# Patient Record
Sex: MALE | Race: BLACK OR AFRICAN AMERICAN | NOT HISPANIC OR LATINO | Employment: FULL TIME | URBAN - METROPOLITAN AREA
[De-identification: names, ages, dates, MRNs, and addresses within clinical notes are randomized per-mention and may not be internally consistent; named-entity substitution may affect disease eponyms.]

---

## 2021-05-21 ENCOUNTER — TELEPHONE (OUTPATIENT)
Dept: ADMINISTRATIVE | Facility: OTHER | Age: 26
End: 2021-05-21

## 2021-06-01 ENCOUNTER — OFFICE VISIT (OUTPATIENT)
Dept: INTERNAL MEDICINE | Facility: CLINIC | Age: 26
End: 2021-06-01
Payer: COMMERCIAL

## 2021-06-01 ENCOUNTER — LAB VISIT (OUTPATIENT)
Dept: LAB | Facility: HOSPITAL | Age: 26
End: 2021-06-01
Attending: NURSE PRACTITIONER
Payer: COMMERCIAL

## 2021-06-01 VITALS
WEIGHT: 192.69 LBS | TEMPERATURE: 98 F | HEART RATE: 79 BPM | BODY MASS INDEX: 27.58 KG/M2 | DIASTOLIC BLOOD PRESSURE: 70 MMHG | SYSTOLIC BLOOD PRESSURE: 130 MMHG | OXYGEN SATURATION: 98 % | HEIGHT: 70 IN

## 2021-06-01 DIAGNOSIS — Z00.00 ENCOUNTER FOR MEDICAL EXAMINATION TO ESTABLISH CARE: ICD-10-CM

## 2021-06-01 DIAGNOSIS — Z13.6 ENCOUNTER FOR LIPID SCREENING FOR CARDIOVASCULAR DISEASE: ICD-10-CM

## 2021-06-01 DIAGNOSIS — Z13.220 ENCOUNTER FOR LIPID SCREENING FOR CARDIOVASCULAR DISEASE: ICD-10-CM

## 2021-06-01 DIAGNOSIS — Z00.00 ENCOUNTER FOR MEDICAL EXAMINATION TO ESTABLISH CARE: Primary | ICD-10-CM

## 2021-06-01 LAB
ALBUMIN SERPL BCP-MCNC: 4.3 G/DL (ref 3.5–5.2)
ALP SERPL-CCNC: 55 U/L (ref 55–135)
ALT SERPL W/O P-5'-P-CCNC: 14 U/L (ref 10–44)
ANION GAP SERPL CALC-SCNC: 9 MMOL/L (ref 8–16)
AST SERPL-CCNC: 26 U/L (ref 10–40)
BASOPHILS # BLD AUTO: 0.03 K/UL (ref 0–0.2)
BASOPHILS NFR BLD: 0.4 % (ref 0–1.9)
BILIRUB SERPL-MCNC: 0.4 MG/DL (ref 0.1–1)
BUN SERPL-MCNC: 12 MG/DL (ref 6–20)
CALCIUM SERPL-MCNC: 9.7 MG/DL (ref 8.7–10.5)
CHLORIDE SERPL-SCNC: 105 MMOL/L (ref 95–110)
CHOLEST SERPL-MCNC: 198 MG/DL (ref 120–199)
CHOLEST/HDLC SERPL: 3.8 {RATIO} (ref 2–5)
CO2 SERPL-SCNC: 24 MMOL/L (ref 23–29)
CREAT SERPL-MCNC: 1.2 MG/DL (ref 0.5–1.4)
DIFFERENTIAL METHOD: ABNORMAL
EOSINOPHIL # BLD AUTO: 0.1 K/UL (ref 0–0.5)
EOSINOPHIL NFR BLD: 0.8 % (ref 0–8)
ERYTHROCYTE [DISTWIDTH] IN BLOOD BY AUTOMATED COUNT: 15.5 % (ref 11.5–14.5)
EST. GFR  (AFRICAN AMERICAN): >60 ML/MIN/1.73 M^2
EST. GFR  (NON AFRICAN AMERICAN): >60 ML/MIN/1.73 M^2
GLUCOSE SERPL-MCNC: 91 MG/DL (ref 70–110)
HCT VFR BLD AUTO: 45.4 % (ref 40–54)
HDLC SERPL-MCNC: 52 MG/DL (ref 40–75)
HDLC SERPL: 26.3 % (ref 20–50)
HGB BLD-MCNC: 14.4 G/DL (ref 14–18)
IMM GRANULOCYTES # BLD AUTO: 0.08 K/UL (ref 0–0.04)
IMM GRANULOCYTES NFR BLD AUTO: 1 % (ref 0–0.5)
LDLC SERPL CALC-MCNC: 134.6 MG/DL (ref 63–159)
LYMPHOCYTES # BLD AUTO: 2 K/UL (ref 1–4.8)
LYMPHOCYTES NFR BLD: 25.7 % (ref 18–48)
MCH RBC QN AUTO: 28.1 PG (ref 27–31)
MCHC RBC AUTO-ENTMCNC: 31.7 G/DL (ref 32–36)
MCV RBC AUTO: 89 FL (ref 82–98)
MONOCYTES # BLD AUTO: 0.7 K/UL (ref 0.3–1)
MONOCYTES NFR BLD: 8.7 % (ref 4–15)
NEUTROPHILS # BLD AUTO: 5 K/UL (ref 1.8–7.7)
NEUTROPHILS NFR BLD: 63.4 % (ref 38–73)
NONHDLC SERPL-MCNC: 146 MG/DL
NRBC BLD-RTO: 0 /100 WBC
PLATELET # BLD AUTO: 349 K/UL (ref 150–450)
PMV BLD AUTO: 11.5 FL (ref 9.2–12.9)
POTASSIUM SERPL-SCNC: 4.3 MMOL/L (ref 3.5–5.1)
PROT SERPL-MCNC: 7 G/DL (ref 6–8.4)
RBC # BLD AUTO: 5.12 M/UL (ref 4.6–6.2)
SODIUM SERPL-SCNC: 138 MMOL/L (ref 136–145)
T4 FREE SERPL-MCNC: 0.96 NG/DL (ref 0.71–1.51)
TRIGL SERPL-MCNC: 57 MG/DL (ref 30–150)
TSH SERPL DL<=0.005 MIU/L-ACNC: 0.98 UIU/ML (ref 0.4–4)
WBC # BLD AUTO: 7.9 K/UL (ref 3.9–12.7)

## 2021-06-01 PROCEDURE — 80053 COMPREHEN METABOLIC PANEL: CPT | Performed by: NURSE PRACTITIONER

## 2021-06-01 PROCEDURE — 36415 COLL VENOUS BLD VENIPUNCTURE: CPT | Mod: PO | Performed by: NURSE PRACTITIONER

## 2021-06-01 PROCEDURE — 84443 ASSAY THYROID STIM HORMONE: CPT | Performed by: NURSE PRACTITIONER

## 2021-06-01 PROCEDURE — 85025 COMPLETE CBC W/AUTO DIFF WBC: CPT | Performed by: NURSE PRACTITIONER

## 2021-06-01 PROCEDURE — 99999 PR PBB SHADOW E&M-EST. PATIENT-LVL III: CPT | Mod: PBBFAC,,, | Performed by: NURSE PRACTITIONER

## 2021-06-01 PROCEDURE — 80061 LIPID PANEL: CPT | Performed by: NURSE PRACTITIONER

## 2021-06-01 PROCEDURE — 99385 PR PREVENTIVE VISIT,NEW,18-39: ICD-10-PCS | Mod: S$GLB,,, | Performed by: NURSE PRACTITIONER

## 2021-06-01 PROCEDURE — 99999 PR PBB SHADOW E&M-EST. PATIENT-LVL III: ICD-10-PCS | Mod: PBBFAC,,, | Performed by: NURSE PRACTITIONER

## 2021-06-01 PROCEDURE — 3008F PR BODY MASS INDEX (BMI) DOCUMENTED: ICD-10-PCS | Mod: CPTII,S$GLB,, | Performed by: NURSE PRACTITIONER

## 2021-06-01 PROCEDURE — 1126F PR PAIN SEVERITY QUANTIFIED, NO PAIN PRESENT: ICD-10-PCS | Mod: S$GLB,,, | Performed by: NURSE PRACTITIONER

## 2021-06-01 PROCEDURE — 1126F AMNT PAIN NOTED NONE PRSNT: CPT | Mod: S$GLB,,, | Performed by: NURSE PRACTITIONER

## 2021-06-01 PROCEDURE — 99385 PREV VISIT NEW AGE 18-39: CPT | Mod: S$GLB,,, | Performed by: NURSE PRACTITIONER

## 2021-06-01 PROCEDURE — 3008F BODY MASS INDEX DOCD: CPT | Mod: CPTII,S$GLB,, | Performed by: NURSE PRACTITIONER

## 2021-06-01 PROCEDURE — 84439 ASSAY OF FREE THYROXINE: CPT | Performed by: NURSE PRACTITIONER

## 2021-06-04 ENCOUNTER — PATIENT MESSAGE (OUTPATIENT)
Dept: INTERNAL MEDICINE | Facility: CLINIC | Age: 26
End: 2021-06-04

## 2021-07-26 ENCOUNTER — CLINICAL SUPPORT (OUTPATIENT)
Dept: URGENT CARE | Facility: CLINIC | Age: 26
End: 2021-07-26
Payer: COMMERCIAL

## 2021-07-26 DIAGNOSIS — Z11.59 ENCOUNTER FOR SCREENING FOR OTHER VIRAL DISEASES: Primary | ICD-10-CM

## 2021-07-26 LAB
CTP QC/QA: YES
SARS-COV-2 RDRP RESP QL NAA+PROBE: NEGATIVE

## 2021-07-26 PROCEDURE — 99211 OFF/OP EST MAY X REQ PHY/QHP: CPT | Mod: S$GLB,CS,, | Performed by: STUDENT IN AN ORGANIZED HEALTH CARE EDUCATION/TRAINING PROGRAM

## 2021-07-26 PROCEDURE — U0002 COVID-19 LAB TEST NON-CDC: HCPCS | Mod: QW,S$GLB,, | Performed by: STUDENT IN AN ORGANIZED HEALTH CARE EDUCATION/TRAINING PROGRAM

## 2021-07-26 PROCEDURE — U0002: ICD-10-PCS | Mod: QW,S$GLB,, | Performed by: STUDENT IN AN ORGANIZED HEALTH CARE EDUCATION/TRAINING PROGRAM

## 2021-07-26 PROCEDURE — 99211 PR OFFICE/OUTPT VISIT, EST, LEVL I: ICD-10-PCS | Mod: S$GLB,CS,, | Performed by: STUDENT IN AN ORGANIZED HEALTH CARE EDUCATION/TRAINING PROGRAM

## 2021-11-11 ENCOUNTER — CLINICAL SUPPORT (OUTPATIENT)
Dept: URGENT CARE | Facility: CLINIC | Age: 26
End: 2021-11-11
Payer: COMMERCIAL

## 2021-11-11 DIAGNOSIS — Z11.59 ENCOUNTER FOR SCREENING FOR OTHER VIRAL DISEASES: Primary | ICD-10-CM

## 2021-11-11 LAB
CTP QC/QA: YES
SARS-COV-2 RDRP RESP QL NAA+PROBE: NEGATIVE

## 2021-11-11 PROCEDURE — U0002: ICD-10-PCS | Mod: QW,S$GLB,, | Performed by: STUDENT IN AN ORGANIZED HEALTH CARE EDUCATION/TRAINING PROGRAM

## 2021-11-11 PROCEDURE — U0002 COVID-19 LAB TEST NON-CDC: HCPCS | Mod: QW,S$GLB,, | Performed by: STUDENT IN AN ORGANIZED HEALTH CARE EDUCATION/TRAINING PROGRAM

## 2021-11-11 PROCEDURE — 99211 OFF/OP EST MAY X REQ PHY/QHP: CPT | Mod: S$GLB,,, | Performed by: STUDENT IN AN ORGANIZED HEALTH CARE EDUCATION/TRAINING PROGRAM

## 2021-11-11 PROCEDURE — 99211 PR OFFICE/OUTPT VISIT, EST, LEVL I: ICD-10-PCS | Mod: S$GLB,,, | Performed by: STUDENT IN AN ORGANIZED HEALTH CARE EDUCATION/TRAINING PROGRAM

## 2021-12-01 ENCOUNTER — OFFICE VISIT (OUTPATIENT)
Dept: INTERNAL MEDICINE | Facility: CLINIC | Age: 26
End: 2021-12-01
Payer: COMMERCIAL

## 2021-12-01 VITALS
WEIGHT: 184.31 LBS | DIASTOLIC BLOOD PRESSURE: 72 MMHG | TEMPERATURE: 98 F | HEART RATE: 68 BPM | BODY MASS INDEX: 26.39 KG/M2 | SYSTOLIC BLOOD PRESSURE: 104 MMHG | RESPIRATION RATE: 19 BRPM | OXYGEN SATURATION: 98 % | HEIGHT: 70 IN

## 2021-12-01 DIAGNOSIS — Z00.00 PHYSICAL EXAM, ANNUAL: ICD-10-CM

## 2021-12-01 DIAGNOSIS — Z76.89 ESTABLISHING CARE WITH NEW DOCTOR, ENCOUNTER FOR: Primary | ICD-10-CM

## 2021-12-01 DIAGNOSIS — Z28.20 VACCINE REFUSED BY PATIENT: ICD-10-CM

## 2021-12-01 PROCEDURE — 99999 PR PBB SHADOW E&M-EST. PATIENT-LVL III: CPT | Mod: PBBFAC,,, | Performed by: STUDENT IN AN ORGANIZED HEALTH CARE EDUCATION/TRAINING PROGRAM

## 2021-12-01 PROCEDURE — 99395 PREV VISIT EST AGE 18-39: CPT | Mod: S$GLB,,, | Performed by: STUDENT IN AN ORGANIZED HEALTH CARE EDUCATION/TRAINING PROGRAM

## 2021-12-01 PROCEDURE — 99395 PR PREVENTIVE VISIT,EST,18-39: ICD-10-PCS | Mod: S$GLB,,, | Performed by: STUDENT IN AN ORGANIZED HEALTH CARE EDUCATION/TRAINING PROGRAM

## 2021-12-01 PROCEDURE — 99999 PR PBB SHADOW E&M-EST. PATIENT-LVL III: ICD-10-PCS | Mod: PBBFAC,,, | Performed by: STUDENT IN AN ORGANIZED HEALTH CARE EDUCATION/TRAINING PROGRAM

## 2023-02-14 ENCOUNTER — TELEPHONE (OUTPATIENT)
Dept: INTERNAL MEDICINE | Facility: CLINIC | Age: 28
End: 2023-02-14
Payer: COMMERCIAL

## 2023-02-14 NOTE — TELEPHONE ENCOUNTER
Reason for visit: Testicular pain      Comments:   Groin pain   I tried calling pt to assist with scheduling an appt.  No answer. Left VM for pt to call the clinic

## 2023-03-06 ENCOUNTER — PATIENT MESSAGE (OUTPATIENT)
Dept: ADMINISTRATIVE | Facility: HOSPITAL | Age: 28
End: 2023-03-06
Payer: COMMERCIAL

## 2023-03-06 ENCOUNTER — PATIENT OUTREACH (OUTPATIENT)
Dept: ADMINISTRATIVE | Facility: HOSPITAL | Age: 28
End: 2023-03-06
Payer: COMMERCIAL

## 2023-03-08 ENCOUNTER — TELEPHONE (OUTPATIENT)
Dept: INTERNAL MEDICINE | Facility: CLINIC | Age: 28
End: 2023-03-08
Payer: COMMERCIAL

## 2023-03-14 NOTE — PROGRESS NOTES
Subjective:      Chief Complaint: Groin Pain (right) and Testicle Pain (Swelling for a few weeks)    HPI  Mr. Arturo Salguero III is a 27-year-old man presenting for annual physical and with concern for hernia:     Hernia? :   -describing right-sided groin/testicular pain and swelling x weeks  -does not exercise regularly but does AC work (lots of heavy lifting)  -no associated unintentional weight loss, GI symptoms, recounted cryptorchidism, or family history of testicular cancer    Family, social, surgical Hx reviewed     Health Maintenance:  Due for routine screening labs and vaccinations    No past medical history on file.  No past surgical history on file.  Family History   Problem Relation Age of Onset    Hypertension Father     Diabetes Brother      Social History     Socioeconomic History    Marital status: Single   Tobacco Use    Smoking status: Never    Smokeless tobacco: Never   Substance and Sexual Activity    Alcohol use: Never    Drug use: Never    Sexual activity: Not Currently     Partners: Female     Birth control/protection: Condom     Review of patient's allergies indicates:   Allergen Reactions    Amoxicillin      Arturo Salguero III had no medications administered during this visit.      Review of Systems   Constitutional:  Negative for appetite change, chills and fever.   HENT: Negative.     Respiratory:  Negative for cough, chest tightness and shortness of breath.    Cardiovascular:  Negative for chest pain, palpitations and leg swelling.   Gastrointestinal:  Negative for abdominal distention, abdominal pain, blood in stool, constipation, diarrhea, nausea and vomiting.   Endocrine: Negative.    Genitourinary:  Positive for testicular pain. Negative for difficulty urinating, dysuria, frequency and hematuria.   Musculoskeletal: Negative.    Integumentary:  Negative.   Neurological: Negative.    Psychiatric/Behavioral: Negative.         Objective:      Vitals:    03/15/23 0802   BP: 126/70   Pulse: 70    Resp: 16   Temp: 97.9 °F (36.6 °C)      Physical Exam  Vitals reviewed.   Constitutional:       General: He is not in acute distress.     Appearance: Normal appearance.   HENT:      Head: Normocephalic and atraumatic.      Comments: Facial features are symmetric      Nose: Nose normal. No congestion or rhinorrhea.      Mouth/Throat:      Mouth: Mucous membranes are moist.      Pharynx: Oropharynx is clear. No oropharyngeal exudate or posterior oropharyngeal erythema.   Eyes:      General: No scleral icterus.     Extraocular Movements: Extraocular movements intact.      Conjunctiva/sclera: Conjunctivae normal.   Cardiovascular:      Rate and Rhythm: Normal rate and regular rhythm.      Pulses: Normal pulses.      Heart sounds: Normal heart sounds.   Pulmonary:      Effort: Pulmonary effort is normal. No respiratory distress.      Breath sounds: Normal breath sounds.   Genitourinary:     Comments: Marked right testicular swelling with low-dose scrotal distension.  Is limited accuracy inguinal canal assessment, no obvious hernia identified nor inguinal lymphadenopathy.  Musculoskeletal:         General: No deformity or signs of injury. Normal range of motion.      Cervical back: Normal range of motion.      Comments: Gait normal    Skin:     General: Skin is warm and dry.      Findings: No rash.   Neurological:      General: No focal deficit present.      Mental Status: He is alert and oriented to person, place, and time. Mental status is at baseline.   Psychiatric:         Mood and Affect: Mood normal.         Behavior: Behavior normal.         Thought Content: Thought content normal.     No current outpatient medications on file prior to visit.     No current facility-administered medications on file prior to visit.         Assessment:       1. Physical exam, annual    2. Hernia of abdominal wall    3. Right testicular pain        Plan:       Physical exam, annual  -     CBC Auto Differential; Future; Expected  date: 03/15/2023  -     Comprehensive Metabolic Panel; Future; Expected date: 03/15/2023  -     Hemoglobin A1C; Future; Expected date: 03/15/2023  -     Hepatitis C Antibody; Future; Expected date: 03/15/2023  -     HIV 1/2 Ag/Ab (4th Gen); Future; Expected date: 03/15/2023  -     Lipid Panel; Future; Expected date: 03/15/2023  -     T4; Future; Expected date: 03/15/2023  -     TSH; Future; Expected date: 03/15/2023  -     Vitamin D; Future; Expected date: 03/15/2023  -     APTT; Future; Expected date: 03/15/2023  -     PROTIME-INR; Future; Expected date: 03/15/2023   - annual screening labs ordered    Hernia of abdominal wall  Right testicular pain  -     US Scrotum And Testicles; Future; Expected date: 03/15/2023  -     BHCG Quant, Tumor Marker; Future; Expected date: 03/15/2023  -     Ambulatory referral/consult to General Surgery; Future; Expected date: 03/22/2023   - presumptive/provisional diagnosis hernia pending ultrasound results (abdominal and testicular)   - quantitative beta HCG also added to annual screening labs as above for further delineation of significant right testicular swelling   - general for surgery referral generated in preparation for ultrasound results as above    Other orders  -     (In Office Administered) Tdap Vaccine

## 2023-03-15 ENCOUNTER — LAB VISIT (OUTPATIENT)
Dept: LAB | Facility: HOSPITAL | Age: 28
End: 2023-03-15
Attending: STUDENT IN AN ORGANIZED HEALTH CARE EDUCATION/TRAINING PROGRAM
Payer: COMMERCIAL

## 2023-03-15 ENCOUNTER — OFFICE VISIT (OUTPATIENT)
Dept: INTERNAL MEDICINE | Facility: CLINIC | Age: 28
End: 2023-03-15
Payer: COMMERCIAL

## 2023-03-15 VITALS
HEART RATE: 70 BPM | RESPIRATION RATE: 16 BRPM | TEMPERATURE: 98 F | HEIGHT: 70 IN | WEIGHT: 220.25 LBS | OXYGEN SATURATION: 99 % | BODY MASS INDEX: 31.53 KG/M2 | SYSTOLIC BLOOD PRESSURE: 126 MMHG | DIASTOLIC BLOOD PRESSURE: 70 MMHG

## 2023-03-15 DIAGNOSIS — K43.9 HERNIA OF ABDOMINAL WALL: ICD-10-CM

## 2023-03-15 DIAGNOSIS — Z00.00 PHYSICAL EXAM, ANNUAL: Primary | ICD-10-CM

## 2023-03-15 DIAGNOSIS — N50.811 RIGHT TESTICULAR PAIN: ICD-10-CM

## 2023-03-15 DIAGNOSIS — Z00.00 PHYSICAL EXAM, ANNUAL: ICD-10-CM

## 2023-03-15 LAB
25(OH)D3+25(OH)D2 SERPL-MCNC: 24 NG/ML (ref 30–96)
ALBUMIN SERPL BCP-MCNC: 4.2 G/DL (ref 3.5–5.2)
ALP SERPL-CCNC: 84 U/L (ref 55–135)
ALT SERPL W/O P-5'-P-CCNC: 27 U/L (ref 10–44)
ANION GAP SERPL CALC-SCNC: 8 MMOL/L (ref 8–16)
APTT BLDCRRT: 30.5 SEC (ref 21–32)
AST SERPL-CCNC: 35 U/L (ref 10–40)
BASOPHILS # BLD AUTO: 0.03 K/UL (ref 0–0.2)
BASOPHILS NFR BLD: 0.5 % (ref 0–1.9)
BILIRUB SERPL-MCNC: 0.4 MG/DL (ref 0.1–1)
BUN SERPL-MCNC: 9 MG/DL (ref 6–20)
CALCIUM SERPL-MCNC: 9.7 MG/DL (ref 8.7–10.5)
CHLORIDE SERPL-SCNC: 104 MMOL/L (ref 95–110)
CHOLEST SERPL-MCNC: 211 MG/DL (ref 120–199)
CHOLEST/HDLC SERPL: 5.3 {RATIO} (ref 2–5)
CO2 SERPL-SCNC: 26 MMOL/L (ref 23–29)
CREAT SERPL-MCNC: 1.1 MG/DL (ref 0.5–1.4)
DIFFERENTIAL METHOD: ABNORMAL
EOSINOPHIL # BLD AUTO: 0.1 K/UL (ref 0–0.5)
EOSINOPHIL NFR BLD: 2.1 % (ref 0–8)
ERYTHROCYTE [DISTWIDTH] IN BLOOD BY AUTOMATED COUNT: 15.4 % (ref 11.5–14.5)
EST. GFR  (NO RACE VARIABLE): >60 ML/MIN/1.73 M^2
ESTIMATED AVG GLUCOSE: 108 MG/DL (ref 68–131)
GLUCOSE SERPL-MCNC: 89 MG/DL (ref 70–110)
HBA1C MFR BLD: 5.4 % (ref 4–5.6)
HCT VFR BLD AUTO: 47.3 % (ref 40–54)
HCV AB SERPL QL IA: NORMAL
HDLC SERPL-MCNC: 40 MG/DL (ref 40–75)
HDLC SERPL: 19 % (ref 20–50)
HGB BLD-MCNC: 14.9 G/DL (ref 14–18)
HIV 1+2 AB+HIV1 P24 AG SERPL QL IA: NORMAL
IMM GRANULOCYTES # BLD AUTO: 0.02 K/UL (ref 0–0.04)
IMM GRANULOCYTES NFR BLD AUTO: 0.3 % (ref 0–0.5)
INR PPP: 1 (ref 0.8–1.2)
LDLC SERPL CALC-MCNC: 152.4 MG/DL (ref 63–159)
LYMPHOCYTES # BLD AUTO: 1.4 K/UL (ref 1–4.8)
LYMPHOCYTES NFR BLD: 23.5 % (ref 18–48)
MCH RBC QN AUTO: 27.7 PG (ref 27–31)
MCHC RBC AUTO-ENTMCNC: 31.5 G/DL (ref 32–36)
MCV RBC AUTO: 88 FL (ref 82–98)
MONOCYTES # BLD AUTO: 0.7 K/UL (ref 0.3–1)
MONOCYTES NFR BLD: 10.6 % (ref 4–15)
NEUTROPHILS # BLD AUTO: 3.9 K/UL (ref 1.8–7.7)
NEUTROPHILS NFR BLD: 63 % (ref 38–73)
NONHDLC SERPL-MCNC: 171 MG/DL
NRBC BLD-RTO: 0 /100 WBC
PLATELET # BLD AUTO: 378 K/UL (ref 150–450)
PMV BLD AUTO: 10.7 FL (ref 9.2–12.9)
POTASSIUM SERPL-SCNC: 4.3 MMOL/L (ref 3.5–5.1)
PROT SERPL-MCNC: 7.5 G/DL (ref 6–8.4)
PROTHROMBIN TIME: 10.8 SEC (ref 9–12.5)
RBC # BLD AUTO: 5.37 M/UL (ref 4.6–6.2)
SODIUM SERPL-SCNC: 138 MMOL/L (ref 136–145)
T4 SERPL-MCNC: 5.6 UG/DL (ref 4.5–11.5)
TRIGL SERPL-MCNC: 93 MG/DL (ref 30–150)
TSH SERPL DL<=0.005 MIU/L-ACNC: 1.4 UIU/ML (ref 0.4–4)
WBC # BLD AUTO: 6.13 K/UL (ref 3.9–12.7)

## 2023-03-15 PROCEDURE — 1159F PR MEDICATION LIST DOCUMENTED IN MEDICAL RECORD: ICD-10-PCS | Mod: CPTII,S$GLB,, | Performed by: STUDENT IN AN ORGANIZED HEALTH CARE EDUCATION/TRAINING PROGRAM

## 2023-03-15 PROCEDURE — 90715 TDAP VACCINE 7 YRS/> IM: CPT | Mod: S$GLB,,, | Performed by: STUDENT IN AN ORGANIZED HEALTH CARE EDUCATION/TRAINING PROGRAM

## 2023-03-15 PROCEDURE — 85025 COMPLETE CBC W/AUTO DIFF WBC: CPT | Performed by: STUDENT IN AN ORGANIZED HEALTH CARE EDUCATION/TRAINING PROGRAM

## 2023-03-15 PROCEDURE — 99395 PREV VISIT EST AGE 18-39: CPT | Mod: 25,S$GLB,, | Performed by: STUDENT IN AN ORGANIZED HEALTH CARE EDUCATION/TRAINING PROGRAM

## 2023-03-15 PROCEDURE — 3008F PR BODY MASS INDEX (BMI) DOCUMENTED: ICD-10-PCS | Mod: CPTII,S$GLB,, | Performed by: STUDENT IN AN ORGANIZED HEALTH CARE EDUCATION/TRAINING PROGRAM

## 2023-03-15 PROCEDURE — 90471 IMMUNIZATION ADMIN: CPT | Mod: S$GLB,,, | Performed by: STUDENT IN AN ORGANIZED HEALTH CARE EDUCATION/TRAINING PROGRAM

## 2023-03-15 PROCEDURE — 1159F MED LIST DOCD IN RCRD: CPT | Mod: CPTII,S$GLB,, | Performed by: STUDENT IN AN ORGANIZED HEALTH CARE EDUCATION/TRAINING PROGRAM

## 2023-03-15 PROCEDURE — 90471 TDAP VACCINE GREATER THAN OR EQUAL TO 7YO IM: ICD-10-PCS | Mod: S$GLB,,, | Performed by: STUDENT IN AN ORGANIZED HEALTH CARE EDUCATION/TRAINING PROGRAM

## 2023-03-15 PROCEDURE — 36415 COLL VENOUS BLD VENIPUNCTURE: CPT | Mod: PO | Performed by: STUDENT IN AN ORGANIZED HEALTH CARE EDUCATION/TRAINING PROGRAM

## 2023-03-15 PROCEDURE — 3008F BODY MASS INDEX DOCD: CPT | Mod: CPTII,S$GLB,, | Performed by: STUDENT IN AN ORGANIZED HEALTH CARE EDUCATION/TRAINING PROGRAM

## 2023-03-15 PROCEDURE — 82306 VITAMIN D 25 HYDROXY: CPT | Performed by: STUDENT IN AN ORGANIZED HEALTH CARE EDUCATION/TRAINING PROGRAM

## 2023-03-15 PROCEDURE — 99395 PR PREVENTIVE VISIT,EST,18-39: ICD-10-PCS | Mod: 25,S$GLB,, | Performed by: STUDENT IN AN ORGANIZED HEALTH CARE EDUCATION/TRAINING PROGRAM

## 2023-03-15 PROCEDURE — 80053 COMPREHEN METABOLIC PANEL: CPT | Performed by: STUDENT IN AN ORGANIZED HEALTH CARE EDUCATION/TRAINING PROGRAM

## 2023-03-15 PROCEDURE — 3074F PR MOST RECENT SYSTOLIC BLOOD PRESSURE < 130 MM HG: ICD-10-PCS | Mod: CPTII,S$GLB,, | Performed by: STUDENT IN AN ORGANIZED HEALTH CARE EDUCATION/TRAINING PROGRAM

## 2023-03-15 PROCEDURE — 3074F SYST BP LT 130 MM HG: CPT | Mod: CPTII,S$GLB,, | Performed by: STUDENT IN AN ORGANIZED HEALTH CARE EDUCATION/TRAINING PROGRAM

## 2023-03-15 PROCEDURE — 3078F DIAST BP <80 MM HG: CPT | Mod: CPTII,S$GLB,, | Performed by: STUDENT IN AN ORGANIZED HEALTH CARE EDUCATION/TRAINING PROGRAM

## 2023-03-15 PROCEDURE — 87389 HIV-1 AG W/HIV-1&-2 AB AG IA: CPT | Performed by: STUDENT IN AN ORGANIZED HEALTH CARE EDUCATION/TRAINING PROGRAM

## 2023-03-15 PROCEDURE — 83036 HEMOGLOBIN GLYCOSYLATED A1C: CPT | Performed by: STUDENT IN AN ORGANIZED HEALTH CARE EDUCATION/TRAINING PROGRAM

## 2023-03-15 PROCEDURE — 85610 PROTHROMBIN TIME: CPT | Performed by: STUDENT IN AN ORGANIZED HEALTH CARE EDUCATION/TRAINING PROGRAM

## 2023-03-15 PROCEDURE — 84436 ASSAY OF TOTAL THYROXINE: CPT | Performed by: STUDENT IN AN ORGANIZED HEALTH CARE EDUCATION/TRAINING PROGRAM

## 2023-03-15 PROCEDURE — 85730 THROMBOPLASTIN TIME PARTIAL: CPT | Performed by: STUDENT IN AN ORGANIZED HEALTH CARE EDUCATION/TRAINING PROGRAM

## 2023-03-15 PROCEDURE — 99999 PR PBB SHADOW E&M-EST. PATIENT-LVL IV: ICD-10-PCS | Mod: PBBFAC,,, | Performed by: STUDENT IN AN ORGANIZED HEALTH CARE EDUCATION/TRAINING PROGRAM

## 2023-03-15 PROCEDURE — 86803 HEPATITIS C AB TEST: CPT | Performed by: STUDENT IN AN ORGANIZED HEALTH CARE EDUCATION/TRAINING PROGRAM

## 2023-03-15 PROCEDURE — 80061 LIPID PANEL: CPT | Performed by: STUDENT IN AN ORGANIZED HEALTH CARE EDUCATION/TRAINING PROGRAM

## 2023-03-15 PROCEDURE — 84702 CHORIONIC GONADOTROPIN TEST: CPT | Performed by: STUDENT IN AN ORGANIZED HEALTH CARE EDUCATION/TRAINING PROGRAM

## 2023-03-15 PROCEDURE — 90715 TDAP VACCINE GREATER THAN OR EQUAL TO 7YO IM: ICD-10-PCS | Mod: S$GLB,,, | Performed by: STUDENT IN AN ORGANIZED HEALTH CARE EDUCATION/TRAINING PROGRAM

## 2023-03-15 PROCEDURE — 99999 PR PBB SHADOW E&M-EST. PATIENT-LVL IV: CPT | Mod: PBBFAC,,, | Performed by: STUDENT IN AN ORGANIZED HEALTH CARE EDUCATION/TRAINING PROGRAM

## 2023-03-15 PROCEDURE — 84443 ASSAY THYROID STIM HORMONE: CPT | Performed by: STUDENT IN AN ORGANIZED HEALTH CARE EDUCATION/TRAINING PROGRAM

## 2023-03-15 PROCEDURE — 3078F PR MOST RECENT DIASTOLIC BLOOD PRESSURE < 80 MM HG: ICD-10-PCS | Mod: CPTII,S$GLB,, | Performed by: STUDENT IN AN ORGANIZED HEALTH CARE EDUCATION/TRAINING PROGRAM

## 2023-03-20 ENCOUNTER — HOSPITAL ENCOUNTER (OUTPATIENT)
Dept: RADIOLOGY | Facility: HOSPITAL | Age: 28
Discharge: HOME | End: 2023-03-20
Attending: STUDENT IN AN ORGANIZED HEALTH CARE EDUCATION/TRAINING PROGRAM
Payer: COMMERCIAL

## 2023-03-20 DIAGNOSIS — K43.9 HERNIA OF ABDOMINAL WALL: ICD-10-CM

## 2023-03-20 DIAGNOSIS — R93.89 ABNORMAL ULTRASOUND: Primary | ICD-10-CM

## 2023-03-20 DIAGNOSIS — N50.811 RIGHT TESTICULAR PAIN: ICD-10-CM

## 2023-03-20 PROCEDURE — 76870 US EXAM SCROTUM: CPT | Mod: TC

## 2023-03-20 PROCEDURE — 76870 US SCROTUM AND TESTICLES: ICD-10-PCS | Mod: 26,,, | Performed by: RADIOLOGY

## 2023-03-20 PROCEDURE — 76705 ECHO EXAM OF ABDOMEN: CPT | Mod: TC

## 2023-03-20 PROCEDURE — 76870 US EXAM SCROTUM: CPT | Mod: 26,,, | Performed by: RADIOLOGY

## 2023-03-20 PROCEDURE — 76705 US ABDOMEN LIMITED_HERNIA: ICD-10-PCS | Mod: 26,,, | Performed by: RADIOLOGY

## 2023-03-20 PROCEDURE — 76705 ECHO EXAM OF ABDOMEN: CPT | Mod: 26,,, | Performed by: RADIOLOGY

## 2023-03-21 ENCOUNTER — OFFICE VISIT (OUTPATIENT)
Dept: UROLOGY | Facility: CLINIC | Age: 28
End: 2023-03-21
Payer: COMMERCIAL

## 2023-03-21 ENCOUNTER — LAB VISIT (OUTPATIENT)
Dept: LAB | Facility: HOSPITAL | Age: 28
End: 2023-03-21
Attending: UROLOGY
Payer: COMMERCIAL

## 2023-03-21 VITALS
HEART RATE: 73 BPM | HEIGHT: 70 IN | WEIGHT: 220.25 LBS | BODY MASS INDEX: 31.53 KG/M2 | DIASTOLIC BLOOD PRESSURE: 73 MMHG | SYSTOLIC BLOOD PRESSURE: 155 MMHG

## 2023-03-21 DIAGNOSIS — N50.89 MASS OF RIGHT TESTIS: Primary | ICD-10-CM

## 2023-03-21 DIAGNOSIS — N50.89 MASS OF RIGHT TESTIS: ICD-10-CM

## 2023-03-21 DIAGNOSIS — R93.89 ABNORMAL ULTRASOUND: ICD-10-CM

## 2023-03-21 LAB
AFP SERPL-MCNC: 694 NG/ML (ref 0–8.4)
B-HCG SERPL-ACNC: 710 IU/L
LDH SERPL L TO P-CCNC: 677 U/L (ref 110–260)

## 2023-03-21 PROCEDURE — 99999 PR PBB SHADOW E&M-EST. PATIENT-LVL III: ICD-10-PCS | Mod: PBBFAC,,, | Performed by: UROLOGY

## 2023-03-21 PROCEDURE — 99204 OFFICE O/P NEW MOD 45 MIN: CPT | Mod: S$GLB,,, | Performed by: UROLOGY

## 2023-03-21 PROCEDURE — 99213 OFFICE O/P EST LOW 20 MIN: CPT | Mod: PBBFAC | Performed by: UROLOGY

## 2023-03-21 PROCEDURE — 82105 ALPHA-FETOPROTEIN SERUM: CPT | Performed by: UROLOGY

## 2023-03-21 PROCEDURE — 83615 LACTATE (LD) (LDH) ENZYME: CPT | Performed by: UROLOGY

## 2023-03-21 PROCEDURE — 87086 URINE CULTURE/COLONY COUNT: CPT | Performed by: UROLOGY

## 2023-03-21 PROCEDURE — 99999 PR PBB SHADOW E&M-EST. PATIENT-LVL III: CPT | Mod: PBBFAC,,, | Performed by: UROLOGY

## 2023-03-21 PROCEDURE — 84702 CHORIONIC GONADOTROPIN TEST: CPT | Performed by: UROLOGY

## 2023-03-21 PROCEDURE — 36415 COLL VENOUS BLD VENIPUNCTURE: CPT | Performed by: UROLOGY

## 2023-03-21 PROCEDURE — 99204 PR OFFICE/OUTPT VISIT, NEW, LEVL IV, 45-59 MIN: ICD-10-PCS | Mod: S$GLB,,, | Performed by: UROLOGY

## 2023-03-21 NOTE — PROGRESS NOTES
"Urology - [facility]  Clinic Note    SUBJECTIVE:     Chief Complaint: Abnormal scrotal ultrasound    History of Present Illness:  Arturo Salguero III is a 27 y.o. male who presents with an abnormal scrotal ultrasound. Initially obtained as part of an evaluation for right sided groin and testicle swelling and pain.  US on 03/20/23 demonstrated "bilateral testicular enlargement with markedly heterogeneous echotexture, concerning for infiltrative process such as lymphoma."  He also underwent an inguinal ultrasound at the same time, negative for hernia.    Patient reports right groin pain that began a month ago, mostly related to movement and certain positions, however he also had pain at rest. He also reports painless right testicular swelling that has been present for about 1 month.  He reports it has been stable since he first became aware of it.  He denies prior similar episodes. He denies complaints or changes related to the left testicle, including swelling and pain.     He has no significant past medical history and does not take any medications. He denies family history of any cancers        PATIENT HISTORY:  History reviewed. No pertinent past medical history.  History reviewed. No pertinent surgical history.  Family History   Problem Relation Age of Onset    Hypertension Father     Diabetes Brother      Social History     Socioeconomic History    Marital status: Single   Tobacco Use    Smoking status: Never    Smokeless tobacco: Never   Substance and Sexual Activity    Alcohol use: Never    Drug use: Never    Sexual activity: Not Currently     Partners: Female     Birth control/protection: Condom       Medications:  No outpatient encounter medications on file as of 3/21/2023.     No facility-administered encounter medications on file as of 3/21/2023.     Allergies:  Amoxicillin    Review of Systems:  Review of Systems   Constitutional:  Negative for chills, fever, malaise/fatigue and weight loss. " "  Respiratory:  Negative for shortness of breath.    Cardiovascular:  Negative for chest pain.   Gastrointestinal:  Negative for constipation, diarrhea, nausea and vomiting.   Genitourinary:  Negative for flank pain, frequency and hematuria.        Painless right testicular swelling   Musculoskeletal:         Right groin pain, primarily with movement     OBJECTIVE:     Estimated body mass index is 31.6 kg/m² as calculated from the following:    Height as of this encounter: 5' 10" (1.778 m).    Weight as of this encounter: 99.9 kg (220 lb 3.8 oz).    Vital Signs (Most Recent)  Pulse: 73 (03/21/23 0912)  BP: (!) 155/73 (03/21/23 0912)    Physical Exam  Constitutional:       General: He is not in acute distress.     Appearance: Normal appearance. He is not ill-appearing.   HENT:      Head: Normocephalic and atraumatic.      Mouth/Throat:      Mouth: Mucous membranes are moist.   Eyes:      Extraocular Movements: Extraocular movements intact.   Cardiovascular:      Rate and Rhythm: Normal rate.   Pulmonary:      Effort: Pulmonary effort is normal.   Abdominal:      Palpations: Abdomen is soft.   Genitourinary:     Penis: Normal.       Comments: The right testicle is enlarged and firm, approximately 130-150cc. Non-tender, no erythema or fluctuance. The left testicle is approximately 16 cc and is non-tender.   Musculoskeletal:      Cervical back: Normal range of motion.   Skin:     General: Skin is warm and dry.   Neurological:      Mental Status: He is alert and oriented to person, place, and time.   Psychiatric:         Mood and Affect: Mood normal.         Behavior: Behavior normal.     Labs:  Lab Results   Component Value Date    BUN 9 03/15/2023    CREATININE 1.1 03/15/2023    WBC 6.13 03/15/2023    HGB 14.9 03/15/2023    HCT 47.3 03/15/2023     03/15/2023    AST 35 03/15/2023    ALT 27 03/15/2023    ALKPHOS 84 03/15/2023    ALBUMIN 4.2 03/15/2023    HGBA1C 5.4 03/15/2023        No results found for: PSA, " PSADIAG, PSAFREE, PSAFREEPCT, PSARAT      Imaging:  US abdomen 03/20/23: No evidence of hernia  US scrotum and testicles 03/20/23: Bilateral testicular enlargement with markedly heterogeneous echotexture, concerning for infiltrative process such as lymphoma    ASSESSMENT     1. Mass of right testis    2. Abnormal ultrasound        PLAN:        -tumor markers today  -plan for CT chest abdomen and pelvis to rule out systemic lymphoma or lymphadenopathy.  -likely plan for right radical orchiectomy, tentatively scheduled for Friday March 31st at 7:00 a.m..  -will contact [employer] regarding the patient's desire to have a testicular prosthesis.  -sperm banking information provided.      Perico Shah MD     Letter to Glen Andrade MD, Glen Andrade MD     The patient was seen and examined with the resident physician.  All questions were answered.  The plan was discussed with the patient and I concur with the resident physician's documentation.

## 2023-03-22 LAB — BACTERIA UR CULT: NO GROWTH

## 2023-03-23 ENCOUNTER — HOSPITAL ENCOUNTER (OUTPATIENT)
Dept: RADIOLOGY | Facility: HOSPITAL | Age: 28
Discharge: HOME | End: 2023-03-23
Attending: UROLOGY
Payer: COMMERCIAL

## 2023-03-23 DIAGNOSIS — N50.89 MASS OF RIGHT TESTIS: ICD-10-CM

## 2023-03-23 LAB — B-HCG SERPL-ACNC: 690 IU/L

## 2023-03-23 PROCEDURE — 71260 CT THORAX DX C+: CPT | Mod: TC

## 2023-03-23 PROCEDURE — 71260 CT CHEST ABDOMEN PELVIS WITH CONTRAST (XPD): ICD-10-PCS | Mod: 26,,, | Performed by: RADIOLOGY

## 2023-03-23 PROCEDURE — 25500020 PHARM REV CODE 255: Performed by: UROLOGY

## 2023-03-23 PROCEDURE — 74177 CT ABD & PELVIS W/CONTRAST: CPT | Mod: 26,,, | Performed by: RADIOLOGY

## 2023-03-23 PROCEDURE — 74177 CT ABD & PELVIS W/CONTRAST: CPT | Mod: TC

## 2023-03-23 PROCEDURE — 71260 CT THORAX DX C+: CPT | Mod: 26,,, | Performed by: RADIOLOGY

## 2023-03-23 PROCEDURE — 74177 CT CHEST ABDOMEN PELVIS WITH CONTRAST (XPD): ICD-10-PCS | Mod: 26,,, | Performed by: RADIOLOGY

## 2023-03-23 RX ADMIN — IOHEXOL 100 ML: 350 INJECTION, SOLUTION INTRAVENOUS at 08:03

## 2023-03-30 ENCOUNTER — ANESTHESIA EVENT (OUTPATIENT)
Dept: SURGERY | Facility: HOSPITAL | Age: 28
End: 2023-03-30
Payer: COMMERCIAL

## 2023-03-30 ENCOUNTER — TELEPHONE (OUTPATIENT)
Dept: UROLOGY | Facility: CLINIC | Age: 28
End: 2023-03-30
Payer: COMMERCIAL

## 2023-03-30 NOTE — PRE-PROCEDURE INSTRUCTIONS
PreOp Instructions given:   - Verbal medication information (what to hold and what to take)   - NPO guidelines   - Arrival place directions given; time to be given the day before procedure by the   Surgeon's Office [address]  - Bathing with antibacterial soap   - Don't wear any jewelry or bring any valuables AM of surgery   - No makeup or moisturizer to face   - No perfume/cologne, powder, lotions or aftershave   Pt. verbalized understanding.   Pt denies any FAMILY h/o Anesthesia/Sedation complications or side effects.    THIS WILL BE PATIENT'S 1ST SURGERY

## 2023-03-30 NOTE — ANESTHESIA PREPROCEDURE EVALUATION
[facility]  Anesthesia Pre-Operative Evaluation         Patient Name: Arturo Salguero III  YOB: 1995  MRN: [id number]    SUBJECTIVE:     Pre-operative evaluation for Procedure(s) (LRB):  ORCHIECTOMY, RADICAL, INGUINAL APPROACH (Right)     03/30/2023    Arturo Salguero III is a 27 y.o. male w/ a significant PMHx of right groin pain and b/l testicular enlargement with markedly heterogenous echotexture c/f infiltrative process such as lymphoma who presents for the above procedure.      LDA: None documented.    Prev airway: None documented.     Drips: None documented.    There is no problem list on file for this patient.      Review of patient's allergies indicates:   Allergen Reactions    Amoxicillin        Current Inpatient Medications:      No current facility-administered medications on file prior to encounter.     No current outpatient medications on file prior to encounter.       No past surgical history on file.    Social History     Socioeconomic History    Marital status: Single   Tobacco Use    Smoking status: Never    Smokeless tobacco: Never   Substance and Sexual Activity    Alcohol use: Never    Drug use: Never    Sexual activity: Not Currently     Partners: Female     Birth control/protection: Condom       OBJECTIVE:     Vital Signs Range (Last 24H):         CBC:   No results for input(s): WBC, RBC, HGB, HCT, PLT, MCV, MCH, MCHC in the last 72 hours.    CMP: No results for input(s): NA, K, CL, CO2, BUN, CREATININE, GLU, MG, PHOS, CALCIUM, ALBUMIN, PROT, ALKPHOS, ALT, AST, BILITOT in the last 72 hours.    INR:  No results for input(s): PT, INR, PROTIME, APTT in the last 72 hours.    Diagnostic Studies: No relevant studies.    EKG: No recent studies available.    2D ECHO:  No results found for this or any previous visit.      ASSESSMENT/PLAN:         Pre-op Assessment    I have reviewed the Patient Summary Reports.    I have  reviewed the NPO Status.   I have reviewed the Medications.     Review of Systems  Anesthesia Hx:  No problems with previous Anesthesia    Hematology/Oncology:  Hematology Normal   Oncology Normal     EENT/Dental:EENT/Dental Normal   Cardiovascular:  Cardiovascular Normal     Pulmonary:  Pulmonary Normal    Renal/:  Renal/ Normal     Hepatic/GI:  Hepatic/GI Normal    Neurological:  Neurology Normal    Endocrine:  Endocrine Normal    Psych:  Psychiatric Normal           Physical Exam  General: Well nourished, Alert, Oriented and Cooperative    Airway:  Mallampati: III / II  Mouth Opening: Normal  TM Distance: Normal  Tongue: Normal  Neck ROM: Normal ROM    Chest/Lungs:  Normal Respiratory Rate    Heart:  Rate: Normal        Anesthesia Plan  Type of Anesthesia, risks & benefits discussed:    Anesthesia Type: Gen ETT  Intra-op Monitoring Plan: Standard ASA Monitors  Post Op Pain Control Plan: multimodal analgesia  Induction:  IV  Airway Plan: Direct, Post-Induction  Informed Consent: Informed consent signed with the Patient and all parties understand the risks and agree with anesthesia plan.  All questions answered.   ASA Score: 2  Day of Surgery Review of History & Physical: H&P Update referred to the surgeon/provider.    Ready For Surgery From Anesthesia Perspective.     .

## 2023-03-30 NOTE — TELEPHONE ENCOUNTER
"You are scheduled for surgery with Dr. Shah on 3/31/2023. Your arrival time is 5 am. You are to report to the 2nd floor surgery center (take the atrium elevators right next to the piano up to 2nd floor). Upon exiting the elevators, you will see a sign saying, "Surgery Center and Family Waiting Room". Follow the hallway and check in at the desk. You need someone with you to drive you home when you are released from the hospital.  No alcohol 24 hours before and after and no smoking a few days prior. NOTHING TO EAT OR DRINK AFTER MIDNIGHT THE NIGHT PRIOR TO THE SURGERY INCLUDING GUM, CANDY AND MINTS. Take a shower the night before and the morning of with Hibiclens Antibacterial soap and no lotion, cologne, deodorant or powder in the morning.      "

## 2023-03-31 ENCOUNTER — HOSPITAL ENCOUNTER (OUTPATIENT)
Facility: HOSPITAL | Age: 28
Discharge: HOME | End: 2023-03-31
Attending: UROLOGY | Admitting: UROLOGY
Payer: COMMERCIAL

## 2023-03-31 ENCOUNTER — ANESTHESIA (OUTPATIENT)
Dept: SURGERY | Facility: HOSPITAL | Age: 28
End: 2023-03-31
Payer: COMMERCIAL

## 2023-03-31 VITALS
SYSTOLIC BLOOD PRESSURE: 133 MMHG | BODY MASS INDEX: 31.5 KG/M2 | TEMPERATURE: 98 F | RESPIRATION RATE: 19 BRPM | OXYGEN SATURATION: 96 % | HEIGHT: 70 IN | WEIGHT: 220 LBS | HEART RATE: 90 BPM | DIASTOLIC BLOOD PRESSURE: 66 MMHG

## 2023-03-31 DIAGNOSIS — N50.89 TESTICULAR MASS: Primary | ICD-10-CM

## 2023-03-31 DIAGNOSIS — N50.89 MASS OF RIGHT TESTIS: Primary | ICD-10-CM

## 2023-03-31 PROCEDURE — 25000003 PHARM REV CODE 250: Performed by: STUDENT IN AN ORGANIZED HEALTH CARE EDUCATION/TRAINING PROGRAM

## 2023-03-31 PROCEDURE — 63600175 PHARM REV CODE 636 W HCPCS: Performed by: STUDENT IN AN ORGANIZED HEALTH CARE EDUCATION/TRAINING PROGRAM

## 2023-03-31 PROCEDURE — 27800903 OPTIME MED/SURG SUP & DEVICES OTHER IMPLANTS: Performed by: UROLOGY

## 2023-03-31 PROCEDURE — 37000008 HC ANESTHESIA 1ST 15 MINUTES: Performed by: UROLOGY

## 2023-03-31 PROCEDURE — 37000009 HC ANESTHESIA EA ADD 15 MINS: Performed by: UROLOGY

## 2023-03-31 PROCEDURE — 71000015 HC POSTOP RECOV 1ST HR: Performed by: UROLOGY

## 2023-03-31 PROCEDURE — 54530 REMOVAL OF TESTIS: CPT | Mod: RT,,, | Performed by: UROLOGY

## 2023-03-31 PROCEDURE — 71000044 HC DOSC ROUTINE RECOVERY FIRST HOUR: Performed by: UROLOGY

## 2023-03-31 PROCEDURE — 25000003 PHARM REV CODE 250: Performed by: UROLOGY

## 2023-03-31 PROCEDURE — 63600175 PHARM REV CODE 636 W HCPCS: Performed by: UROLOGY

## 2023-03-31 PROCEDURE — D9220A PRA ANESTHESIA: Mod: ,,, | Performed by: STUDENT IN AN ORGANIZED HEALTH CARE EDUCATION/TRAINING PROGRAM

## 2023-03-31 PROCEDURE — 54530 PR REMOVAL TESTIS,RADICAL: ICD-10-PCS | Mod: RT,,, | Performed by: UROLOGY

## 2023-03-31 PROCEDURE — D9220A PRA ANESTHESIA: ICD-10-PCS | Mod: ,,, | Performed by: STUDENT IN AN ORGANIZED HEALTH CARE EDUCATION/TRAINING PROGRAM

## 2023-03-31 PROCEDURE — 36000707: Performed by: UROLOGY

## 2023-03-31 PROCEDURE — 36000706: Performed by: UROLOGY

## 2023-03-31 DEVICE — IMPLANTABLE DEVICE: Type: IMPLANTABLE DEVICE | Site: SCROTUM | Status: FUNCTIONAL

## 2023-03-31 RX ORDER — SODIUM CHLORIDE 0.9 % (FLUSH) 0.9 %
10 SYRINGE (ML) INJECTION
Status: DISCONTINUED | OUTPATIENT
Start: 2023-03-31 | End: 2023-03-31 | Stop reason: HOSPADM

## 2023-03-31 RX ORDER — ACETAMINOPHEN 10 MG/ML
INJECTION, SOLUTION INTRAVENOUS
Status: DISCONTINUED | OUTPATIENT
Start: 2023-03-31 | End: 2023-03-31

## 2023-03-31 RX ORDER — PROPOFOL 10 MG/ML
VIAL (ML) INTRAVENOUS
Status: DISCONTINUED | OUTPATIENT
Start: 2023-03-31 | End: 2023-03-31

## 2023-03-31 RX ORDER — KETAMINE HCL IN 0.9 % NACL 50 MG/5 ML
SYRINGE (ML) INTRAVENOUS
Status: DISCONTINUED | OUTPATIENT
Start: 2023-03-31 | End: 2023-03-31

## 2023-03-31 RX ORDER — FENTANYL CITRATE 50 UG/ML
25 INJECTION, SOLUTION INTRAMUSCULAR; INTRAVENOUS EVERY 5 MIN PRN
Status: DISCONTINUED | OUTPATIENT
Start: 2023-03-31 | End: 2023-03-31 | Stop reason: HOSPADM

## 2023-03-31 RX ORDER — HYDROMORPHONE HYDROCHLORIDE 1 MG/ML
0.2 INJECTION, SOLUTION INTRAMUSCULAR; INTRAVENOUS; SUBCUTANEOUS EVERY 5 MIN PRN
Status: DISCONTINUED | OUTPATIENT
Start: 2023-03-31 | End: 2023-03-31 | Stop reason: HOSPADM

## 2023-03-31 RX ORDER — SULFAMETHOXAZOLE AND TRIMETHOPRIM 800; 160 MG/1; MG/1
1 TABLET ORAL 2 TIMES DAILY
Qty: 4 TABLET | Refills: 0 | Status: SHIPPED | OUTPATIENT
Start: 2023-03-31 | End: 2023-04-02

## 2023-03-31 RX ORDER — GENTAMICIN SULFATE 100 MG/100ML
INJECTION, SOLUTION INTRAVENOUS
Status: DISCONTINUED | OUTPATIENT
Start: 2023-03-31 | End: 2023-03-31

## 2023-03-31 RX ORDER — ONDANSETRON 2 MG/ML
INJECTION INTRAMUSCULAR; INTRAVENOUS
Status: DISCONTINUED | OUTPATIENT
Start: 2023-03-31 | End: 2023-03-31

## 2023-03-31 RX ORDER — MIDAZOLAM HYDROCHLORIDE 1 MG/ML
INJECTION INTRAMUSCULAR; INTRAVENOUS
Status: DISCONTINUED | OUTPATIENT
Start: 2023-03-31 | End: 2023-03-31

## 2023-03-31 RX ORDER — SODIUM CHLORIDE 9 MG/ML
INJECTION, SOLUTION INTRAVENOUS CONTINUOUS PRN
Status: DISCONTINUED | OUTPATIENT
Start: 2023-03-31 | End: 2023-03-31

## 2023-03-31 RX ORDER — DEXMEDETOMIDINE HYDROCHLORIDE 100 UG/ML
INJECTION, SOLUTION INTRAVENOUS
Status: DISCONTINUED | OUTPATIENT
Start: 2023-03-31 | End: 2023-03-31

## 2023-03-31 RX ORDER — DEXAMETHASONE SODIUM PHOSPHATE 4 MG/ML
INJECTION, SOLUTION INTRA-ARTICULAR; INTRALESIONAL; INTRAMUSCULAR; INTRAVENOUS; SOFT TISSUE
Status: DISCONTINUED | OUTPATIENT
Start: 2023-03-31 | End: 2023-03-31

## 2023-03-31 RX ORDER — LIDOCAINE HYDROCHLORIDE 20 MG/ML
INJECTION INTRAVENOUS
Status: DISCONTINUED | OUTPATIENT
Start: 2023-03-31 | End: 2023-03-31

## 2023-03-31 RX ORDER — ROCURONIUM BROMIDE 10 MG/ML
INJECTION, SOLUTION INTRAVENOUS
Status: DISCONTINUED | OUTPATIENT
Start: 2023-03-31 | End: 2023-03-31

## 2023-03-31 RX ORDER — FENTANYL CITRATE 50 UG/ML
INJECTION, SOLUTION INTRAMUSCULAR; INTRAVENOUS
Status: DISCONTINUED | OUTPATIENT
Start: 2023-03-31 | End: 2023-03-31

## 2023-03-31 RX ORDER — OXYCODONE HYDROCHLORIDE 5 MG/1
5 TABLET ORAL EVERY 4 HOURS PRN
Qty: 10 TABLET | Refills: 0 | Status: ON HOLD | OUTPATIENT
Start: 2023-03-31 | End: 2023-07-20

## 2023-03-31 RX ORDER — HALOPERIDOL 5 MG/ML
0.5 INJECTION INTRAMUSCULAR EVERY 10 MIN PRN
Status: DISCONTINUED | OUTPATIENT
Start: 2023-03-31 | End: 2023-03-31 | Stop reason: HOSPADM

## 2023-03-31 RX ADMIN — SUGAMMADEX 200 MG: 100 INJECTION, SOLUTION INTRAVENOUS at 08:03

## 2023-03-31 RX ADMIN — DEXAMETHASONE SODIUM PHOSPHATE 4 MG: 4 INJECTION, SOLUTION INTRAMUSCULAR; INTRAVENOUS at 07:03

## 2023-03-31 RX ADMIN — GENTAMICIN SULFATE 251.2 MG: 1 INJECTION, SOLUTION INTRAVENOUS at 07:03

## 2023-03-31 RX ADMIN — ACETAMINOPHEN 1000 MG: 10 INJECTION INTRAVENOUS at 07:03

## 2023-03-31 RX ADMIN — ONDANSETRON 4 MG: 2 INJECTION INTRAMUSCULAR; INTRAVENOUS at 07:03

## 2023-03-31 RX ADMIN — MIDAZOLAM HYDROCHLORIDE 2 MG: 1 INJECTION, SOLUTION INTRAMUSCULAR; INTRAVENOUS at 06:03

## 2023-03-31 RX ADMIN — LIDOCAINE HYDROCHLORIDE 100 MG: 20 INJECTION INTRAVENOUS at 07:03

## 2023-03-31 RX ADMIN — DEXMEDETOMIDINE HYDROCHLORIDE 12 MCG: 100 INJECTION, SOLUTION INTRAVENOUS at 07:03

## 2023-03-31 RX ADMIN — GENTAMICIN SULFATE 251.2 MG: 40 INJECTION, SOLUTION INTRAMUSCULAR; INTRAVENOUS at 06:03

## 2023-03-31 RX ADMIN — ROCURONIUM BROMIDE 50 MG: 10 INJECTION, SOLUTION INTRAVENOUS at 07:03

## 2023-03-31 RX ADMIN — FENTANYL CITRATE 100 MCG: 50 INJECTION, SOLUTION INTRAMUSCULAR; INTRAVENOUS at 07:03

## 2023-03-31 RX ADMIN — FENTANYL CITRATE 25 MCG: 50 INJECTION INTRAMUSCULAR; INTRAVENOUS at 09:03

## 2023-03-31 RX ADMIN — DEXMEDETOMIDINE HYDROCHLORIDE 8 MCG: 100 INJECTION, SOLUTION INTRAVENOUS at 08:03

## 2023-03-31 RX ADMIN — Medication 35 MG: at 07:03

## 2023-03-31 RX ADMIN — SODIUM CHLORIDE: 0.9 INJECTION, SOLUTION INTRAVENOUS at 06:03

## 2023-03-31 RX ADMIN — VANCOMYCIN HYDROCHLORIDE 1000 MG: 1 INJECTION, POWDER, LYOPHILIZED, FOR SOLUTION INTRAVENOUS at 07:03

## 2023-03-31 RX ADMIN — PROPOFOL 200 MG: 10 INJECTION, EMULSION INTRAVENOUS at 07:03

## 2023-03-31 NOTE — PLAN OF CARE
Pt stable, tolerating po liquid, pain is tolerable.  Discharge instructions given to pt and significant other.  Prescriptions delivered to bedside.  Ready for discharge.

## 2023-03-31 NOTE — INTERVAL H&P NOTE
The patient has been examined and the H&P has been reviewed:    I concur with the findings and no changes have occurred since H&P was written.    Surgery risks, benefits and alternative options discussed and understood by patient/family.    Patient was assessed preoperatively, found to be appropriate in behavior. The risks, benefits, and alternatives to procedures were discussed, and questions were answered. Plan to proceed with described procedure.    No ASA use, has used NSAIDs as recently as Wednesday.     To OR for R radical orchiectomy, testicular prosthesis placement.    There are no problems to display for this patient.

## 2023-03-31 NOTE — PATIENT INSTRUCTIONS
Post Scrotal Surgery Instructions  Do not strain to have a bowel movement  No strenuous exercise x 7 days  No driving while you are on narcotic pain medications or if your kurtz  catheter is in place    You can expect:  Some swelling in your scrotum but significant swelling or pain should be evaluated by an MD or ER  Swelling should resolve in the next few months    You can shower in 2 days    You can place ice on your scrotum for 30 min to 1 hour at a time for swelling  You can also elevate your scrotum under towels to help with swelling when you are sitting    You can wear a jock strap or tight white underwear to help with swelling     Call the doctor if:  Temperature is greater than 101F  Persistent vomiting and inability to keep food down  Inability to pee

## 2023-03-31 NOTE — OP NOTE
[facility]  Operative Note    Date: 03/31/2023    Pre-Op Diagnosis: Right testicular mass    Post-Op Diagnosis: same    Procedure(s) Performed:   1.  Right radical orchiectomy  2.  Placement of right-sided testicular prosthesis ([employer] Torosa)    Specimen(s): Right testicle    Staff Surgeon: Perico Shah MD    Assistant Surgeon: Dez Stewart MD; Gaston Rm MD    Anesthesia: General endotracheal anesthesia    Indications: Arturo Salguero III is a 27 y.o. male with a right-sided testicular mass presenting for right radical orchiectomy. He also desires a testicular prosthesis.    Findings:   Large right-sided testicular mass.   No scrotal violation.  Large sized [employer] Tor[facility] testicular prosthesis filled with 16 mL of sterile saline placed within right hemiscrotum.    Estimated Blood Loss: min    Drains: none    Procedure in detail:  After the risks and benefits of the procedure were explained, informed consent was obtained. The patient was taken to the operating room and placed int he supine position.  SCDs were applied and working.  Anesthesia was administered.  The patient was shaved, prepped and draped in the usual sterile fashion. Timeout was performed and preoperative antibiotics (vancomycin and gentamicin) were confirmed.      A right 6 cm inguinal incision was made sharply with a 15 blade scapel through the dermis. The underlying subcutaneous tissue was disected with  Bovie electrocautery down to the external oblique fascia. Once the external oblique was identified, it was bluntly dissected free from the overlying subcutaneous tissue. The external oblique was opened.  The ilioinguinal nerve was not visualized. The cord was then freed bluntly from its attachments and a penrose drain was wrapped around the cord twice. The testicle was delivered through the incision and the gubernaculum was identified and carefully  from the testicle with electrocautery. There was no  scrotal violation. Once the testicle was free from its gubernacular attachments, the spermatic cord was brought proximally until the internal ring was identified.    One Cynthia clamp was placed across the cord. The cord was cut and removed from the table. A 0 silk stick tie as well as free tie was used to ligate the proximal spermatic cord. We left one of the tags long. Hemostasis was observed. The proximal spermatic cord was then delivered into the retroperitoneum through the internal ring.      A large sized [employer] Torosa testicular prosthesis was then prepped on the back table after soaking in a solution of vancomycin and gentamicin. The implant was filled with 16 mL of sterile saline. The right hemiscrotum was everted and a 3-0 prolene stitch was passed through the dependent scrotum, taking care not to button hole the skin. The stitch was then passed through the tab on the implant. The stitch was tied and the implant was passed into the right hemiscrotum. Again, there was no scrotal violation or button-holing. The implant was seated in a dependent portion of the right hemiscrotum which matched his native left testicle.    The external oblique fascia was closed in a running fashion with 3-0 vicryl. The subcutaneous tissue was closed using a 3-0 vicryl. 1% lidocaine with epinephrine was used to anesthetize the subcutaneous tissues. The skin was closed with subcuticular 4-0 monocryl and dermabond was applied. All needle and sponge counts were correct.    The patient tolerated the procedure well and was transferred to the recovery room in stable condition.      Disposition:  The patient will be discharged home from PACU.    Dez Stewart MD    I have reviewed the operative note performed by Dr. Stewart, and I concur with her/his documentation of Arturo Salguero III. I was present for the critical or key portions of the procedure.

## 2023-03-31 NOTE — ANESTHESIA PROCEDURE NOTES
Intubation    Date/Time: 3/31/2023 7:10 AM  Performed by: Angel Castillo MD  Authorized by: Arti Robertson MD     Intubation:     Induction:  Intravenous    Intubated:  Postinduction    Mask Ventilation:  Easy mask    Attempts:  1    Attempted By:  Resident anesthesiologist    Method of Intubation:  Video laryngoscopy    Blade:  Alaniz 4    Laryngeal View Grade: Grade I - full view of cords      Difficult Airway Encountered?: No      Complications:  None    Airway Device:  Oral endotracheal tube    Airway Device Size:  7.5    Style/Cuff Inflation:  Cuffed (inflated to minimal occlusive pressure)    Inflation Amount (mL):  8    Tube secured:  23    Secured at:  The teeth    Placement Verified By:  Capnometry    Complicating Factors:  None    Findings Post-Intubation:  BS equal bilateral and atraumatic/condition of teeth unchanged

## 2023-03-31 NOTE — ANESTHESIA POSTPROCEDURE EVALUATION
Anesthesia Post Evaluation    Patient: Arturo Salguero III    Procedure(s) Performed: Procedure(s) (LRB):  ORCHIECTOMY, RADICAL, INGUINAL APPROACH (Right)    Final Anesthesia Type: general      Patient location during evaluation: PACU  Patient participation: Yes- Able to Participate  Level of consciousness: awake  Post-procedure vital signs: reviewed and stable  Pain management: adequate  Airway patency: patent    PONV status at discharge: No PONV  Anesthetic complications: no      Cardiovascular status: blood pressure returned to baseline  Respiratory status: unassisted, spontaneous ventilation and room air            Vitals Value Taken Time   /66 03/31/23 1002   Temp 36.4 °C (97.6 °F) 03/31/23 1000   Pulse 0 03/31/23 1014   Resp 21 03/31/23 1013   SpO2 98 % 03/31/23 1012   Vitals shown include unvalidated device data.      No case tracking events are documented in the log.      Pain/Lola Score: Pain Rating Prior to Med Admin: 4 (3/31/2023  9:05 AM)  Pain Rating Post Med Admin: 2 (3/31/2023  9:15 AM)  Lola Score: 10 (3/31/2023 10:15 AM)  Modified Lola Score: 20 (3/31/2023 10:15 AM)

## 2023-03-31 NOTE — TRANSFER OF CARE
"Anesthesia Transfer of Care Note    Patient: Arturo Salguero III    Procedure(s) Performed: Procedure(s) (LRB):  ORCHIECTOMY, RADICAL, INGUINAL APPROACH (Right)    Patient location: PACU    Anesthesia Type: general    Transport from OR: Transported from OR on 6-10 L/min O2 by face mask with adequate spontaneous ventilation    Post pain: adequate analgesia    Post assessment: no apparent anesthetic complications    Post vital signs: stable    Level of consciousness: responds to stimulation    Nausea/Vomiting: no nausea/vomiting    Complications: none    Transfer of care protocol was followed      Last vitals:   Visit Vitals  BP (!) 119/57 (BP Location: Right arm, Patient Position: Lying)   Pulse 108   Temp 36.6 °C (97.9 °F) (Temporal)   Resp 18   Ht 5' 10" (1.778 m)   Wt 99.8 kg (220 lb)   SpO2 100%   BMI 31.57 kg/m²     "

## 2023-03-31 NOTE — BRIEF OP NOTE
[facility] (Formerly [facility])  Brief Operative Note    SUMMARY     Surgery Date: 3/31/2023     Surgeon(s) and Role:     * Perico Kirkpatrick MD - Primary     * Dez Stewart MD - Resident - Assisting     * Gaston Rm MD - Resident - Assisting        Pre-op Diagnosis:  Mass of right testis [N50.89]    Post-op Diagnosis:  Post-Op Diagnosis Codes:     * Mass of right testis [N50.89]    Procedure Performed:   Procedure(s) (LRB):  ORCHIECTOMY, RADICAL, INGUINAL APPROACH (Right)  Placement of right-sided testicular prosthesis (Coloplast Torosa)    Anesthesia: General    Findings:   Large right-sided testicular mass.   No scrotal violation.  Large sized Coloplast Torosa testicular prosthesis filled with 16 mL of sterile saline placed within right hemiscrotum.    Estimated Blood Loss: * No values recorded between 3/31/2023  7:19 AM and 3/31/2023  8:17 AM *         Specimens:   Specimen (24h ago, onward)       Start     Ordered    03/31/23 0800  Specimen to Pathology, Surgery Urology  Once        Comments: Pre-op Diagnosis: Mass of right testis [N50.89]Procedure(s):ORCHIECTOMY, RADICAL, INGUINAL APPROACH Number of specimens: 1Name of specimens: 1)TESTICULAR MASS-PERM     References:    Click here for ordering Quick Tip   Question Answer Comment   Procedure Type: Urology    Specimen Class: Routine/Screening    Which provider would you like to cc? PERICO KIRKPATRICK    Release to patient Immediate        03/31/23 0800

## 2023-03-31 NOTE — DISCHARGE SUMMARY
[facility]  Discharge Note  Short Stay    Admit Date: 3/31/2023    Discharge Date and Time: 03/31/2023 8:31 AM      Attending Physician: Perico Shah MD     Discharge Provider: Dez Stewart MD    Diagnoses:  Right testicular mass      Discharged Condition: good    Hospital Course: Patient was admitted for right radical orchiectomy with placement of right-sided testicular prosthesis and tolerated the procedure well with no complications. The patient was discharged home in good condition on the same day.       Final Diagnoses: Same as principal problem.    Disposition: Home or Self Care    Follow up/Patient Instructions:    Medications:  Reconciled Home Medications:   Current Discharge Medication List        START taking these medications    Details   oxyCODONE (ROXICODONE) 5 MG immediate release tablet Take 1 tablet (5 mg total) by mouth every 4 (four) hours as needed for Pain.  Qty: 10 tablet, Refills: 0    Comments: Quantity prescribed more than 7 day supply? No      sulfamethoxazole-trimethoprim 800-160mg (BACTRIM DS) 800-160 mg Tab Take 1 tablet by mouth 2 (two) times daily. for 2 days  Qty: 4 tablet, Refills: 0           Discharge Procedure Orders   Diet Adult Regular     No driving until:   Order Comments: Off narcotics     Notify your health care provider if you experience any of the following:  temperature >100.4     Notify your health care provider if you experience any of the following:  persistent nausea and vomiting or diarrhea     Notify your health care provider if you experience any of the following:  severe uncontrolled pain     Notify your health care provider if you experience any of the following:  difficulty breathing or increased cough     Notify your health care provider if you experience any of the following:  severe persistent headache     Notify your health care provider if you experience any of the following:  worsening rash     Notify your health care provider if you  experience any of the following:  persistent dizziness, light-headedness, or visual disturbances     Notify your health care provider if you experience any of the following:  increased confusion or weakness     Notify your health care provider if you experience any of the following:  redness, tenderness, or signs of infection (pain, swelling, redness, odor or green/yellow discharge around incision site)     Activity as tolerated      Follow-up Information       Perico Shah MD Follow up.    Specialty: Urology  Why: Urology follow up  Contact information:  [address]  [facility] [address]  [phone]

## 2023-04-06 ENCOUNTER — TELEPHONE (OUTPATIENT)
Dept: HEMATOLOGY/ONCOLOGY | Facility: CLINIC | Age: 28
End: 2023-04-06
Payer: COMMERCIAL

## 2023-04-06 NOTE — TELEPHONE ENCOUNTER
Oncology nurse navigator contacted patient for scheduling medical oncology referral and labs requested by Dr. Shah. Patient agreed to 4/25, 2:30pm and 3pm for appointments with labs the day prior.  Date, time, and location discussed with patient. All questions/concerns addressed. Patient verbalized understanding. Contact information provided for further assistance.

## 2023-04-12 ENCOUNTER — PATIENT MESSAGE (OUTPATIENT)
Dept: UROLOGY | Facility: CLINIC | Age: 28
End: 2023-04-12
Payer: COMMERCIAL

## 2023-04-13 ENCOUNTER — TELEPHONE (OUTPATIENT)
Dept: UROLOGY | Facility: CLINIC | Age: 28
End: 2023-04-13
Payer: COMMERCIAL

## 2023-04-13 NOTE — TELEPHONE ENCOUNTER
"I SWP Arturo now & gave him message below.  Pt VU.    ----- Message from Perico Shah MD sent at 4/13/2023  7:31 AM CDT -----  Regarding: RE: Pt asking when he can return to work after orchiectomy please  I think 4 weeks unless he can do a "light duty" sort of thing and then it could be 2 weeks.     Perico     ----- Message -----  From: Fior Mejias RN  Sent: 4/12/2023   5:34 PM CDT  To: Perico Shah MD  Subject: Pt asking when he can return to work after o#    I SWP Arturo and suggested he wait at least two weeks and do no lifting greater than 10 lbs.  Pt said he would wait until next week to see your recommendation.    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 Shah clears you."    Thank you, Fior        "

## 2023-04-13 NOTE — TELEPHONE ENCOUNTER
"----- Message from Perico Shah MD sent at 4/13/2023  7:31 AM CDT -----  Regarding: RE: Pt asking when he can return to work after orchiectomy please  I think 4 weeks unless he can do a "light duty" sort of thing and then it could be 2 weeks.     Perico     ----- Message -----  From: Fior Mejias RN  Sent: 4/12/2023   5:34 PM CDT  To: Perico Shah MD  Subject: Pt asking when he can return to work after o#    I SWP Arturo and suggested he wait at least two weeks and do no lifting greater than 10 lbs.  Pt said he would wait until next week to see your recommendation.    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 Shah clears you."    Thank you, Fior        "

## 2023-04-18 LAB
COMMENT: NORMAL
FINAL PATHOLOGIC DIAGNOSIS: NORMAL
GROSS: NORMAL
Lab: NORMAL

## 2023-04-24 ENCOUNTER — LAB VISIT (OUTPATIENT)
Dept: LAB | Facility: HOSPITAL | Age: 28
End: 2023-04-24
Payer: COMMERCIAL

## 2023-04-24 DIAGNOSIS — N50.89 MASS OF RIGHT TESTIS: ICD-10-CM

## 2023-04-24 LAB
AFP SERPL-MCNC: 46 NG/ML (ref 0–8.4)
LDH SERPL L TO P-CCNC: 194 U/L (ref 110–260)

## 2023-04-24 PROCEDURE — 82105 ALPHA-FETOPROTEIN SERUM: CPT | Performed by: STUDENT IN AN ORGANIZED HEALTH CARE EDUCATION/TRAINING PROGRAM

## 2023-04-24 PROCEDURE — 84702 CHORIONIC GONADOTROPIN TEST: CPT | Performed by: STUDENT IN AN ORGANIZED HEALTH CARE EDUCATION/TRAINING PROGRAM

## 2023-04-24 PROCEDURE — 83615 LACTATE (LD) (LDH) ENZYME: CPT | Performed by: STUDENT IN AN ORGANIZED HEALTH CARE EDUCATION/TRAINING PROGRAM

## 2023-04-24 PROCEDURE — 36415 COLL VENOUS BLD VENIPUNCTURE: CPT | Performed by: STUDENT IN AN ORGANIZED HEALTH CARE EDUCATION/TRAINING PROGRAM

## 2023-04-24 NOTE — PROGRESS NOTES
Subjective     Patient ID: Arturo Salguero III is a 27 y.o. male.    Chief Complaint: No chief complaint on file.    HPI    Referring MD: Pablo Calderon    Oncology History:  - noted right testicular swelling and went to his PCP    - 3/20/2023 Testicular Ultrasound:  FINDINGS:  Right Testicle:  *Size: 7.5 x 5.3 x 5.8 cm  *Appearance: Markedly heterogeneous echotexture.  *Flow: Normal arterial and venous flow  *Epididymis: Normal.  *Hydrocele: None.  *Varicocele: None.    Left Testicle:  *Size: 7.7 x 5.2 x 5.6 cm  *Appearance: Markedly heterogeneous echotexture.  *Flow: Normal arterial and venous flow  *Epididymis: Normal.  *Hydrocele: None.  *Varicocele: None.    Other findings: None.  Impression:  Bilateral testicular enlargement with markedly heterogeneous echotexture, concerning for infiltrative process such as lymphoma.  Correlation with prior history is recommended as sequela from bilateral remote orchitis may give a similar appearance.  Consultation with urology is recommended.  This report was flagged in Epic as abnormal.    - 3/21/2023 CT C/A/P:  FINDINGS:  BASE OF NECK: No significant abnormality.  Thyroid gland unremarkable.  THORACIC SOFT TISSUES: No axillary adenopathy.  AORTA: Thoracic aorta maintains normal caliber.  Left-sided aortic arch with normal three vessel branching pattern.  No calcific atherosclerosis of the thoracic aorta.  HEART: Normal size with physiologic pericardial fluid.  PULMONARY VASCULATURE: Unremarkable.  EMILY/MEDIASTINUM: No pathologic alexandra enlargement.  AIRWAYS: Trachea and proximal airways remain patent.  LUNGS/PLEURA: Lungs are symmetrically well expanded.  No focal consolidation, mass, pneumothorax, or pleural fluid.  LIVER: Normal in size and contour.  No focal hepatic lesion.  GALLBLADDER: No calcified gallstones.  BILE DUCTS: No evidence of dilated ducts.  PANCREAS: No mass or peripancreatic fat stranding.  SPLEEN: No splenomegaly.  ADRENALS: Unremarkable.  KIDNEYS/URETERS:  Normal in size and location with normal enhancement.  No hydronephrosis or nephrolithiasis. No ureteral dilatation.  BLADDER: No evidence of wall thickening.  REPRODUCTIVE ORGANS: Heterogeneous 6.5 x 6.2 cm mass-like enlargement of right testicle, correlate with 03/20/2023 ultrasound.  STOMACH/DUODENUM: Unremarkable.  BOWEL/MESENTERY: Small bowel is normal in caliber with no evidence of obstruction. No evidence of inflammation or wall thickening.  Colon demonstrates no focal wall thickening.  Appendix is visualized and appears normal.  PERITONEUM: No intraperitoneal free air or fluid.  LYMPH NODES: Enlarged 1.1 cm pericaval node (axial 3:99).  No pelvic or inguinal lymphadenopathy.  VASCULATURE: No significant calcific atherosclerosis.  Portal venous system is patent.  ABDOMINAL WALL:  Small fat containing umbilical hernia.  BONES: No acute fracture. No suspicious osseous lesions.  Impression:  1. Enlarged 1.1 cm pericaval lymph node.  No additional enlarged lymph nodes throughout the abdomen.  No pelvic or inguinal lymphadenopathy.  2. Heterogeneous 6.5 x 6.2 cm mass-like enlargement of right testicle, correlate with 03/20/2023 ultrasound.  3. Other findings above.  This report was flagged in Epic as abnormal.    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   - See CAP synoptic report below.   CASE SUMMARY: (TESTIS: Radical Orchiectomy)   Standard(s): AJCC-UICC 8   SPECIMEN   Specimen Laterality: Right.   Tumor Focality: Unifocal.   Tumor Size: Greatest dimension of main tumor mass in Centimeters (cm): 6.7 cm.   Histologic Type: Mixed germ cell tumor.   Embryonal carcinoma (specify percentage): 50 %   Yolk sac tumor, postpubertal type (specify percentage): 40 %   Choriocarcinoma (specify percentage): 10 %   Tumor Extent: Invades rete testis.   Lymphovascular Invasion: Present.   Margin Status: All margins negative for tumor.    Regional Lymph Node Status: Not applicable (no regional lymph nodes submitted or found).   Distant Site: Not applicable.   PATHOLOGIC STAGE CLASSIFICATION (pTNM, AJCC 8th Edition): pT2 pN not assigned (no nodes submitted or found).   Blocks for potential molecular or ancillary studies:   Tumor block: 1I or 1L.     PMH:  None    FH:  Mother living at age 53 yo, healthy overall  Father, living at age 53 yo, healthy  1 brother- Type 1 DM- 23 yo  1 sister- healthy  No major health issues known extended family    SH:  Apartment work, maintenance A/C  Good support system  No EtOH (Rare social)  No tobacco    Review of Systems   Constitutional:  Negative for activity change, appetite change, chills, fatigue, fever and unexpected weight change.   HENT:  Negative for dental problem, hearing loss, mouth sores, postnasal drip, rhinorrhea, sinus pressure/congestion, sore throat, tinnitus, trouble swallowing and voice change.    Eyes:  Negative for visual disturbance.   Respiratory:  Negative for cough, shortness of breath and wheezing.    Cardiovascular:  Negative for chest pain, palpitations and leg swelling.   Gastrointestinal:  Negative for abdominal distention, abdominal pain, blood in stool, constipation, diarrhea, nausea and vomiting.   Endocrine: Negative for cold intolerance and heat intolerance.   Genitourinary:  Negative for decreased urine volume, difficulty urinating, dysuria, frequency, hematuria and urgency.   Musculoskeletal:  Negative for arthralgias, back pain, gait problem, joint swelling, myalgias, neck pain and neck stiffness.   Integumentary:  Negative for color change, pallor, rash and wound.   Neurological:  Negative for dizziness, weakness, light-headedness, numbness and headaches.   Hematological:  Negative for adenopathy. Does not bruise/bleed easily.   Psychiatric/Behavioral:  Negative for decreased concentration, dysphoric mood and sleep disturbance. The patient is not nervous/anxious.          Objective     Physical Exam  Vitals and nursing note reviewed.   Constitutional:       General: He is not in acute distress.     Appearance: Normal appearance. He is well-developed and normal weight. He is not ill-appearing.   HENT:      Head: Normocephalic and atraumatic.   Eyes:      General: No scleral icterus.     Extraocular Movements: Extraocular movements intact.      Conjunctiva/sclera: Conjunctivae normal.      Pupils: Pupils are equal, round, and reactive to light.   Neck:      Thyroid: No thyromegaly.      Trachea: No tracheal deviation.   Cardiovascular:      Rate and Rhythm: Normal rate and regular rhythm.      Heart sounds: Normal heart sounds. No murmur heard.    No friction rub. No gallop.   Pulmonary:      Effort: Pulmonary effort is normal. No respiratory distress.      Breath sounds: Normal breath sounds. No wheezing, rhonchi or rales.   Chest:      Chest wall: No tenderness.   Abdominal:      General: Bowel sounds are normal. There is no distension.      Palpations: Abdomen is soft. There is no mass.      Tenderness: There is no abdominal tenderness. There is no guarding or rebound.   Musculoskeletal:         General: No swelling or tenderness. Normal range of motion.      Cervical back: Normal range of motion and neck supple.      Right lower leg: No edema.      Left lower leg: No edema.   Lymphadenopathy:      Cervical: No cervical adenopathy.   Skin:     General: Skin is warm and dry.      Coloration: Skin is not jaundiced or pale.      Findings: No erythema or rash.   Neurological:      General: No focal deficit present.      Mental Status: He is alert and oriented to person, place, and time. Mental status is at baseline.      Sensory: No sensory deficit.      Motor: No weakness or abnormal muscle tone.      Coordination: Coordination normal.      Gait: Gait normal.      Deep Tendon Reflexes: Reflexes are normal and symmetric. Reflexes normal.   Psychiatric:         Mood and Affect: Mood  normal.         Behavior: Behavior normal.         Thought Content: Thought content normal.         Judgment: Judgment normal.     Labs- reviewed  Imaging- reviewed     Assessment and Plan     Problem List Items Addressed This Visit    None  Visit Diagnoses       Malignant neoplasm of descended right testis    -  Primary    Mass of right testis        Relevant Orders    NM PET CT Routine FDG    US Scrotum And Testicles            We reviewed tumor markers and LN finding  PET scan  We discussed chemotherapy to follow  PFTs    > 1 hour total    Route Chart for Scheduling    Med Onc Chart Routing  Urgent    Follow up with physician . PET asap- this week and PFTs asap; virtual with me after PET   Follow up with GREGORY    Infusion scheduling note    Injection scheduling note    Labs    Imaging    Pharmacy appointment    Other referrals

## 2023-04-25 ENCOUNTER — OFFICE VISIT (OUTPATIENT)
Dept: UROLOGY | Facility: CLINIC | Age: 28
End: 2023-04-25
Payer: COMMERCIAL

## 2023-04-25 ENCOUNTER — OFFICE VISIT (OUTPATIENT)
Dept: HEMATOLOGY/ONCOLOGY | Facility: CLINIC | Age: 28
End: 2023-04-25
Payer: COMMERCIAL

## 2023-04-25 VITALS
DIASTOLIC BLOOD PRESSURE: 75 MMHG | HEART RATE: 79 BPM | WEIGHT: 220 LBS | OXYGEN SATURATION: 100 % | HEIGHT: 70 IN | HEIGHT: 70 IN | RESPIRATION RATE: 17 BRPM | SYSTOLIC BLOOD PRESSURE: 129 MMHG | WEIGHT: 220 LBS | HEART RATE: 79 BPM | BODY MASS INDEX: 31.5 KG/M2 | TEMPERATURE: 99 F | BODY MASS INDEX: 31.5 KG/M2 | SYSTOLIC BLOOD PRESSURE: 129 MMHG | DIASTOLIC BLOOD PRESSURE: 75 MMHG

## 2023-04-25 DIAGNOSIS — C62.91 NON-SEMINOMATOUS CANCER OF RIGHT TESTIS: Primary | ICD-10-CM

## 2023-04-25 DIAGNOSIS — C62.11 MALIGNANT NEOPLASM OF DESCENDED RIGHT TESTIS: Primary | ICD-10-CM

## 2023-04-25 DIAGNOSIS — N50.89 MASS OF RIGHT TESTIS: ICD-10-CM

## 2023-04-25 LAB — B-HCG SERPL-ACNC: 177 IU/L

## 2023-04-25 PROCEDURE — 99999 PR PBB SHADOW E&M-EST. PATIENT-LVL IV: ICD-10-PCS | Mod: PBBFAC,,, | Performed by: INTERNAL MEDICINE

## 2023-04-25 PROCEDURE — 99205 OFFICE O/P NEW HI 60 MIN: CPT | Mod: S$GLB,,, | Performed by: INTERNAL MEDICINE

## 2023-04-25 PROCEDURE — 1159F PR MEDICATION LIST DOCUMENTED IN MEDICAL RECORD: ICD-10-PCS | Mod: CPTII,S$GLB,, | Performed by: UROLOGY

## 2023-04-25 PROCEDURE — 3008F PR BODY MASS INDEX (BMI) DOCUMENTED: ICD-10-PCS | Mod: CPTII,S$GLB,, | Performed by: UROLOGY

## 2023-04-25 PROCEDURE — 3008F BODY MASS INDEX DOCD: CPT | Mod: CPTII,S$GLB,, | Performed by: UROLOGY

## 2023-04-25 PROCEDURE — 1159F MED LIST DOCD IN RCRD: CPT | Mod: CPTII,S$GLB,, | Performed by: UROLOGY

## 2023-04-25 PROCEDURE — 99024 PR POST-OP FOLLOW-UP VISIT: ICD-10-PCS | Mod: S$GLB,,, | Performed by: UROLOGY

## 2023-04-25 PROCEDURE — 3044F HG A1C LEVEL LT 7.0%: CPT | Mod: CPTII,S$GLB,, | Performed by: UROLOGY

## 2023-04-25 PROCEDURE — 3074F SYST BP LT 130 MM HG: CPT | Mod: CPTII,S$GLB,, | Performed by: INTERNAL MEDICINE

## 2023-04-25 PROCEDURE — 3074F SYST BP LT 130 MM HG: CPT | Mod: CPTII,S$GLB,, | Performed by: UROLOGY

## 2023-04-25 PROCEDURE — 3078F PR MOST RECENT DIASTOLIC BLOOD PRESSURE < 80 MM HG: ICD-10-PCS | Mod: CPTII,S$GLB,, | Performed by: UROLOGY

## 2023-04-25 PROCEDURE — 3078F PR MOST RECENT DIASTOLIC BLOOD PRESSURE < 80 MM HG: ICD-10-PCS | Mod: CPTII,S$GLB,, | Performed by: INTERNAL MEDICINE

## 2023-04-25 PROCEDURE — 99024 POSTOP FOLLOW-UP VISIT: CPT | Mod: S$GLB,,, | Performed by: UROLOGY

## 2023-04-25 PROCEDURE — 3044F PR MOST RECENT HEMOGLOBIN A1C LEVEL <7.0%: ICD-10-PCS | Mod: CPTII,S$GLB,, | Performed by: UROLOGY

## 2023-04-25 PROCEDURE — 99999 PR PBB SHADOW E&M-EST. PATIENT-LVL IV: CPT | Mod: PBBFAC,,, | Performed by: INTERNAL MEDICINE

## 2023-04-25 PROCEDURE — 3074F PR MOST RECENT SYSTOLIC BLOOD PRESSURE < 130 MM HG: ICD-10-PCS | Mod: CPTII,S$GLB,, | Performed by: INTERNAL MEDICINE

## 2023-04-25 PROCEDURE — 3044F PR MOST RECENT HEMOGLOBIN A1C LEVEL <7.0%: ICD-10-PCS | Mod: CPTII,S$GLB,, | Performed by: INTERNAL MEDICINE

## 2023-04-25 PROCEDURE — 1160F PR REVIEW ALL MEDS BY PRESCRIBER/CLIN PHARMACIST DOCUMENTED: ICD-10-PCS | Mod: CPTII,S$GLB,, | Performed by: UROLOGY

## 2023-04-25 PROCEDURE — 3008F BODY MASS INDEX DOCD: CPT | Mod: CPTII,S$GLB,, | Performed by: INTERNAL MEDICINE

## 2023-04-25 PROCEDURE — 99999 PR PBB SHADOW E&M-EST. PATIENT-LVL III: ICD-10-PCS | Mod: PBBFAC,,, | Performed by: UROLOGY

## 2023-04-25 PROCEDURE — 1160F RVW MEDS BY RX/DR IN RCRD: CPT | Mod: CPTII,S$GLB,, | Performed by: UROLOGY

## 2023-04-25 PROCEDURE — 3078F DIAST BP <80 MM HG: CPT | Mod: CPTII,S$GLB,, | Performed by: INTERNAL MEDICINE

## 2023-04-25 PROCEDURE — 3044F HG A1C LEVEL LT 7.0%: CPT | Mod: CPTII,S$GLB,, | Performed by: INTERNAL MEDICINE

## 2023-04-25 PROCEDURE — 3078F DIAST BP <80 MM HG: CPT | Mod: CPTII,S$GLB,, | Performed by: UROLOGY

## 2023-04-25 PROCEDURE — 3074F PR MOST RECENT SYSTOLIC BLOOD PRESSURE < 130 MM HG: ICD-10-PCS | Mod: CPTII,S$GLB,, | Performed by: UROLOGY

## 2023-04-25 PROCEDURE — 99205 PR OFFICE/OUTPT VISIT, NEW, LEVL V, 60-74 MIN: ICD-10-PCS | Mod: S$GLB,,, | Performed by: INTERNAL MEDICINE

## 2023-04-25 PROCEDURE — 99999 PR PBB SHADOW E&M-EST. PATIENT-LVL III: CPT | Mod: PBBFAC,,, | Performed by: UROLOGY

## 2023-04-25 PROCEDURE — 3008F PR BODY MASS INDEX (BMI) DOCUMENTED: ICD-10-PCS | Mod: CPTII,S$GLB,, | Performed by: INTERNAL MEDICINE

## 2023-04-25 NOTE — PROGRESS NOTES
"Urology - [facility]  Clinic Note    SUBJECTIVE:     Chief Complaint: testicular cancer    History of Present Illness:  Arturo Salguero III is a 27 y.o. male who presents with testicular cancer.    Scrotal US obtained for right sided groin and testicle swelling and pain 23 demonstrated "bilateral testicular enlargement with markedly heterogeneous echotexture, concerning for infiltrative process such as lymphoma."  He also underwent an inguinal ultrasound at the same time, negative for hernia.  He denies prior similar episodes. He denies complaints or changes related to the left testicle, including swelling and pain.     He has no significant past medical history and does not take any medications. 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  Tumor markers today, post-orchiectomy:  BHC (normal range <0.4)  AFP: 46 (S1; normal range 0-8.4)   (WNL)    Recovering well from orchiectomy. Minor scrotal pain when he moves testicular prosthesis. He did bank sperm with [facility], they were able to get one vial.    PATIENT HISTORY:  No past medical history on file.  Past Surgical History:   Procedure Laterality Date    ORCHIECTOMY, RADICAL, INGUINAL APPROACH Right 3/31/2023    Procedure: ORCHIECTOMY, RADICAL, INGUINAL APPROACH;  Surgeon: Perico Shah MD;  Location: [facility] OR [address] [place] [address];  Service: Urology;  Laterality: Right;  90 minutes     Family History   Problem Relation Age of Onset    Hypertension Father     Diabetes Brother      Social History     Socioeconomic History    Marital status: Single   Tobacco Use    Smoking " "status: Never    Smokeless tobacco: Never   Substance and Sexual Activity    Alcohol use: Never    Drug use: Never    Sexual activity: Not Currently     Partners: Female     Birth control/protection: Condom       Medications:  Outpatient Encounter Medications as of 2023   Medication Sig Dispense Refill    oxyCODONE (ROXICODONE) 5 MG immediate release tablet Take 1 tablet (5 mg total) by mouth every 4 (four) hours as needed for Pain. 10 tablet 0    [] sulfamethoxazole-trimethoprim 800-160mg (BACTRIM DS) 800-160 mg Tab Take 1 tablet by mouth 2 (two) times daily. for 2 days 4 tablet 0     No facility-administered encounter medications on file as of 2023.     Allergies:  Amoxicillin    Review of Systems:  Review of Systems   Constitutional:  Negative for chills, fever, malaise/fatigue and weight loss.   Respiratory:  Negative for shortness of breath.    Cardiovascular:  Negative for chest pain.   Gastrointestinal:  Negative for constipation, diarrhea, nausea and vomiting.   Genitourinary:  Negative for flank pain, frequency and hematuria.        Painless right testicular swelling   Musculoskeletal:         Right groin pain, primarily with movement     OBJECTIVE:     Estimated body mass index is 31.57 kg/m² as calculated from the following:    Height as of 3/31/23: 5' 10" (1.778 m).    Weight as of 3/31/23: 99.8 kg (220 lb).    Vital Signs (Most Recent)       Physical Exam  Constitutional:       General: He is not in acute distress.     Appearance: Normal appearance. He is not ill-appearing.   HENT:      Head: Normocephalic and atraumatic.      Mouth/Throat:      Mouth: Mucous membranes are moist.   Eyes:      Extraocular Movements: Extraocular movements intact.   Cardiovascular:      Rate and Rhythm: Normal rate.   Pulmonary:      Effort: Pulmonary effort is normal.   Abdominal:      Palpations: Abdomen is soft.      Comments: R inguinal incisions well-healed   Genitourinary:     Penis: Normal.       " Comments: R testicular prosthesis present, scrotal exam otherwise unremarkable  Musculoskeletal:      Cervical back: Normal range of motion.   Skin:     General: Skin is warm and dry.   Neurological:      Mental Status: He is alert and oriented to person, place, and time.   Psychiatric:         Mood and Affect: Mood normal.         Behavior: Behavior normal.     Labs:  Lab Results   Component Value Date    BUN 9 03/15/2023    CREATININE 1.1 03/15/2023    WBC 6.13 03/15/2023    HGB 14.9 03/15/2023    HCT 47.3 03/15/2023     03/15/2023    AST 35 03/15/2023    ALT 27 03/15/2023    ALKPHOS 84 03/15/2023    ALBUMIN 4.2 03/15/2023    HGBA1C 5.4 03/15/2023       Imaging:  US abdomen 03/20/23: No evidence of hernia  US scrotum and testicles 03/20/23: Bilateral testicular enlargement with markedly heterogeneous echotexture, concerning for infiltrative process such as lymphoma    ASSESSMENT     No diagnosis found.      PLAN:        Mixed NSGCT s/p orchiectomy, stage IIB( pT2 cN2 cM0 S1), good risk    - [place] [facility] [place] Based on current post-orchiectomy tumor markers and CT showing enlarged RP lymph node, patient is good risk stage IIB NSGCT. Based on NCCN guidelines, favor chemotherapy (either BEP x 3 or EP x 4 per guidelines). Patient has appointment with Hematology today. Will follow up with repeat markers and imaging after chemotherapy. Patient is amenable to chemo.    Blake Meredith MD     Letter to Glen Andrade MD,

## 2023-05-01 ENCOUNTER — PATIENT MESSAGE (OUTPATIENT)
Dept: UROLOGY | Facility: CLINIC | Age: 28
End: 2023-05-01
Payer: COMMERCIAL

## 2023-05-01 ENCOUNTER — HOSPITAL ENCOUNTER (OUTPATIENT)
Dept: RADIOLOGY | Facility: HOSPITAL | Age: 28
Discharge: HOME | End: 2023-05-01
Attending: INTERNAL MEDICINE
Payer: COMMERCIAL

## 2023-05-01 DIAGNOSIS — N50.89 MASS OF RIGHT TESTIS: ICD-10-CM

## 2023-05-01 PROCEDURE — 76870 US EXAM SCROTUM: CPT | Mod: 26,,, | Performed by: RADIOLOGY

## 2023-05-01 PROCEDURE — 76870 US SCROTUM AND TESTICLES: ICD-10-PCS | Mod: 26,,, | Performed by: RADIOLOGY

## 2023-05-01 PROCEDURE — 76870 US EXAM SCROTUM: CPT | Mod: TC

## 2023-05-02 ENCOUNTER — PATIENT MESSAGE (OUTPATIENT)
Dept: HEMATOLOGY/ONCOLOGY | Facility: CLINIC | Age: 28
End: 2023-05-02
Payer: COMMERCIAL

## 2023-05-03 ENCOUNTER — PATIENT MESSAGE (OUTPATIENT)
Dept: HEMATOLOGY/ONCOLOGY | Facility: CLINIC | Age: 28
End: 2023-05-03
Payer: COMMERCIAL

## 2023-05-03 ENCOUNTER — TELEPHONE (OUTPATIENT)
Dept: HEMATOLOGY/ONCOLOGY | Facility: CLINIC | Age: 28
End: 2023-05-03
Payer: COMMERCIAL

## 2023-05-03 NOTE — TELEPHONE ENCOUNTER
LVM regarding location of preferance for PET    ----- Message from Zenobia Alston MD sent at 5/2/2023  4:13 PM CDT -----  Can we make sure his PET and virtual appt after are scheduled?    ----- Message -----  From: Interface, Rad Results In  Sent: 5/1/2023   3:05 PM CDT  To: Zenobia Alston MD

## 2023-05-04 ENCOUNTER — TELEPHONE (OUTPATIENT)
Dept: HEMATOLOGY/ONCOLOGY | Facility: CLINIC | Age: 28
End: 2023-05-04
Payer: COMMERCIAL

## 2023-05-04 NOTE — TELEPHONE ENCOUNTER
Spoke to patient, follow up with provider scheduled. He preferred an in person visit to a virtual. He is aware we are working on scheduling his PFT's as well

## 2023-05-05 ENCOUNTER — HOSPITAL ENCOUNTER (OUTPATIENT)
Dept: PULMONOLOGY | Facility: CLINIC | Age: 28
Discharge: HOME | End: 2023-05-05
Payer: COMMERCIAL

## 2023-05-05 DIAGNOSIS — C62.11 MALIGNANT NEOPLASM OF DESCENDED RIGHT TESTIS: ICD-10-CM

## 2023-05-05 LAB
DLCO ADJ PRE: 33.19 ML/(MIN*MMHG) (ref 28.34–42.19)
DLCO SINGLE BREATH LLN: 28.34
DLCO SINGLE BREATH PRE REF: 94.9 %
DLCO SINGLE BREATH REF: 35.26
DLCOC SBVA LLN: 3.69
DLCOC SBVA PRE REF: 105.9 %
DLCOC SBVA REF: 4.95
DLCOC SINGLE BREATH LLN: 28.34
DLCOC SINGLE BREATH PRE REF: 94.1 %
DLCOC SINGLE BREATH REF: 35.26
DLCOCSBVAULN: 6.21
DLCOCSINGLEBREATHULN: 42.19
DLCOSINGLEBREATHULN: 42.19
DLCOVA LLN: 3.69
DLCOVA PRE REF: 106.8 %
DLCOVA PRE: 5.29 ML/(MIN*MMHG*L) (ref 3.69–6.21)
DLCOVA REF: 4.95
DLCOVAULN: 6.21
DLVAADJ PRE: 5.24 ML/(MIN*MMHG*L) (ref 3.69–6.21)
FEF 25 75 LLN: 2.26
FEF 25 75 PRE REF: 108.8 %
FEF 25 75 REF: 4
FEV05 LLN: 2.2
FEV05 REF: 3.34
FEV1 FVC LLN: 73
FEV1 FVC PRE REF: 98.4 %
FEV1 FVC REF: 84
FEV1 LLN: 2.98
FEV1 PRE REF: 116.4 %
FEV1 REF: 3.84
FVC LLN: 3.64
FVC PRE REF: 117.9 %
FVC REF: 4.61
IVC PRE: 5.26 L (ref 3.64–5.59)
IVC SINGLE BREATH LLN: 3.64
IVC SINGLE BREATH PRE REF: 114.1 %
IVC SINGLE BREATH REF: 4.61
IVCSINGLEBREATHULN: 5.59
PEF LLN: 7.03
PEF PRE REF: 104.1 %
PEF REF: 9.68
PHYSICIAN COMMENT: ABNORMAL
PRE DLCO: 33.47 ML/(MIN*MMHG) (ref 28.34–42.19)
PRE FEF 25 75: 4.35 L/S (ref 2.26–6.23)
PRE FET 100: 6.9 SEC
PRE FEV05 REF: 99.8 %
PRE FEV1 FVC: 82.21 % (ref 72.95–92.46)
PRE FEV1: 4.47 L (ref 2.98–4.67)
PRE FEV5: 3.33 L (ref 2.2–4.47)
PRE FVC: 5.44 L (ref 3.64–5.59)
PRE PEF: 10.08 L/S (ref 7.03–12.34)
VA PRE: 6.33 L (ref 6.98–6.98)
VA SINGLE BREATH LLN: 6.98
VA SINGLE BREATH PRE REF: 90.7 %
VA SINGLE BREATH REF: 6.98
VASINGLEBREATHULN: 6.98

## 2023-05-05 PROCEDURE — 94729 PR C02/MEMBANE DIFFUSE CAPACITY: ICD-10-PCS | Mod: S$GLB,,, | Performed by: INTERNAL MEDICINE

## 2023-05-05 PROCEDURE — 94010 BREATHING CAPACITY TEST: CPT | Mod: S$GLB,,, | Performed by: INTERNAL MEDICINE

## 2023-05-05 PROCEDURE — 94729 DIFFUSING CAPACITY: CPT | Mod: S$GLB,,, | Performed by: INTERNAL MEDICINE

## 2023-05-05 PROCEDURE — 94010 BREATHING CAPACITY TEST: ICD-10-PCS | Mod: S$GLB,,, | Performed by: INTERNAL MEDICINE

## 2023-05-08 ENCOUNTER — HOSPITAL ENCOUNTER (OUTPATIENT)
Dept: RADIOLOGY | Facility: HOSPITAL | Age: 28
Discharge: HOME | End: 2023-05-08
Attending: INTERNAL MEDICINE
Payer: COMMERCIAL

## 2023-05-08 DIAGNOSIS — C62.11 MALIGNANT NEOPLASM OF DESCENDED RIGHT TESTIS: ICD-10-CM

## 2023-05-08 LAB — POCT GLUCOSE: 71 MG/DL (ref 70–110)

## 2023-05-08 PROCEDURE — 78815 NM PET CT ROUTINE: ICD-10-PCS | Mod: 26,PI,, | Performed by: RADIOLOGY

## 2023-05-08 PROCEDURE — 78815 PET IMAGE W/CT SKULL-THIGH: CPT | Mod: TC

## 2023-05-08 PROCEDURE — 78815 PET IMAGE W/CT SKULL-THIGH: CPT | Mod: 26,PI,, | Performed by: RADIOLOGY

## 2023-05-08 PROCEDURE — A9552 F18 FDG: HCPCS

## 2023-05-09 ENCOUNTER — OFFICE VISIT (OUTPATIENT)
Dept: HEMATOLOGY/ONCOLOGY | Facility: CLINIC | Age: 28
End: 2023-05-09
Payer: COMMERCIAL

## 2023-05-09 VITALS
BODY MASS INDEX: 29.9 KG/M2 | RESPIRATION RATE: 18 BRPM | HEART RATE: 77 BPM | SYSTOLIC BLOOD PRESSURE: 140 MMHG | DIASTOLIC BLOOD PRESSURE: 97 MMHG | WEIGHT: 208.88 LBS | OXYGEN SATURATION: 96 % | HEIGHT: 70 IN

## 2023-05-09 DIAGNOSIS — R94.8 ABNORMAL POSITRON EMISSION TOMOGRAPHY (PET) SCAN: ICD-10-CM

## 2023-05-09 DIAGNOSIS — C62.11 MALIGNANT NEOPLASM OF DESCENDED RIGHT TESTIS: Primary | ICD-10-CM

## 2023-05-09 DIAGNOSIS — R19.00 INTRA-ABDOMINAL AND PELVIC SWELLING, MASS AND LUMP, UNSPECIFIED SITE: ICD-10-CM

## 2023-05-09 PROCEDURE — 3008F BODY MASS INDEX DOCD: CPT | Mod: CPTII,S$GLB,, | Performed by: INTERNAL MEDICINE

## 2023-05-09 PROCEDURE — 3080F PR MOST RECENT DIASTOLIC BLOOD PRESSURE >= 90 MM HG: ICD-10-PCS | Mod: CPTII,S$GLB,, | Performed by: INTERNAL MEDICINE

## 2023-05-09 PROCEDURE — 99999 PR PBB SHADOW E&M-EST. PATIENT-LVL IV: CPT | Mod: PBBFAC,,, | Performed by: INTERNAL MEDICINE

## 2023-05-09 PROCEDURE — 3077F SYST BP >= 140 MM HG: CPT | Mod: CPTII,S$GLB,, | Performed by: INTERNAL MEDICINE

## 2023-05-09 PROCEDURE — 3008F PR BODY MASS INDEX (BMI) DOCUMENTED: ICD-10-PCS | Mod: CPTII,S$GLB,, | Performed by: INTERNAL MEDICINE

## 2023-05-09 PROCEDURE — 1160F PR REVIEW ALL MEDS BY PRESCRIBER/CLIN PHARMACIST DOCUMENTED: ICD-10-PCS | Mod: CPTII,S$GLB,, | Performed by: INTERNAL MEDICINE

## 2023-05-09 PROCEDURE — 3077F PR MOST RECENT SYSTOLIC BLOOD PRESSURE >= 140 MM HG: ICD-10-PCS | Mod: CPTII,S$GLB,, | Performed by: INTERNAL MEDICINE

## 2023-05-09 PROCEDURE — 3044F PR MOST RECENT HEMOGLOBIN A1C LEVEL <7.0%: ICD-10-PCS | Mod: CPTII,S$GLB,, | Performed by: INTERNAL MEDICINE

## 2023-05-09 PROCEDURE — 1159F MED LIST DOCD IN RCRD: CPT | Mod: CPTII,S$GLB,, | Performed by: INTERNAL MEDICINE

## 2023-05-09 PROCEDURE — 99214 PR OFFICE/OUTPT VISIT, EST, LEVL IV, 30-39 MIN: ICD-10-PCS | Mod: S$GLB,,, | Performed by: INTERNAL MEDICINE

## 2023-05-09 PROCEDURE — 1159F PR MEDICATION LIST DOCUMENTED IN MEDICAL RECORD: ICD-10-PCS | Mod: CPTII,S$GLB,, | Performed by: INTERNAL MEDICINE

## 2023-05-09 PROCEDURE — 99214 OFFICE O/P EST MOD 30 MIN: CPT | Mod: S$GLB,,, | Performed by: INTERNAL MEDICINE

## 2023-05-09 PROCEDURE — 3044F HG A1C LEVEL LT 7.0%: CPT | Mod: CPTII,S$GLB,, | Performed by: INTERNAL MEDICINE

## 2023-05-09 PROCEDURE — 3080F DIAST BP >= 90 MM HG: CPT | Mod: CPTII,S$GLB,, | Performed by: INTERNAL MEDICINE

## 2023-05-09 PROCEDURE — 99999 PR PBB SHADOW E&M-EST. PATIENT-LVL IV: ICD-10-PCS | Mod: PBBFAC,,, | Performed by: INTERNAL MEDICINE

## 2023-05-09 PROCEDURE — 1160F RVW MEDS BY RX/DR IN RCRD: CPT | Mod: CPTII,S$GLB,, | Performed by: INTERNAL MEDICINE

## 2023-05-09 NOTE — PROGRESS NOTES
Subjective     Patient ID: Arturo Salguero III is a 27 y.o. male.    Chief Complaint: Malignant neoplasm of descended right testis    HPI    Returns after repeat labs and PET  Presents with significant other    Completed PFTs in the interval - reviewed    - 4/24/2023 Labs:  BHCG= 177  LDH= 194  AFP= 46    - 5/8/2023 PET:  FINDINGS:  Quality of the study: Adequate.  In the head and neck, there are no hypermetabolic lesions worrisome for malignancy. There are no hypermetabolic mucosal lesions, and there are no pathologically enlarged or hypermetabolic lymph nodes.  In the chest, there are no hypermetabolic lesions worrisome for malignancy.  There are no concerning pulmonary nodules or masses, and there are no pathologically enlarged or hypermetabolic lymph nodes.  Bilateral gynecomastia  In the abdomen and pelvis, there is physiologic tracer distribution within the abdominal organs and excretion into the genitourinary system.  There are multiple prominent hypermetabolic retroperitoneal lymph nodes.  For example, 0.9 cm retrocaval nodes with SUV max 5.1 (axial fused image 165).  1.3 cm aortocaval lymph node at the level of the umbilicus with SUV max 4.9 (axial fused image 173).  0.8 cm right common iliac node with an SUV of 5.0 on image 195.  Postsurgical changes of bilateral orchiectomy with mild postsurgical uptake in the scrotum.  Circumferential bladder wall thickening, likely due to nondistention.  There is a questionable hypermetabolic focus in the posterior right hepatic lobe with a maximum SUV of 4.2 but no correlate on noncontrast CT image 119.  In the bones, there are no definite hypermetabolic lesions worrisome for malignancy.  There is a hypermetabolic focus with an SUV of 3.8 in the T8 vertebral body without CT correlate on image 107.  In the extremities, there are no hypermetabolic lesions worrisome for malignancy.  Impression:  1.  In this patient with testicular cancer status post bilateral orchiectomy,  there are multiple prominent hypermetabolic pelvic and retroperitoneal lymph nodes.  These findings are concerning for metastatic disease.  2.  Questionable hypermetabolic foci in liver and bone may indicate additional metastases.  If management would change, recommend further evaluation with MRI     Oncology History:  - noted right testicular swelling and went to his PCP     - 3/20/2023 Testicular Ultrasound:  FINDINGS:  Right Testicle:  *Size: 7.5 x 5.3 x 5.8 cm  *Appearance: Markedly heterogeneous echotexture.  *Flow: Normal arterial and venous flow  *Epididymis: Normal.  *Hydrocele: None.  *Varicocele: None.     Left Testicle:  *Size: 7.7 x 5.2 x 5.6 cm  *Appearance: Markedly heterogeneous echotexture.  *Flow: Normal arterial and venous flow  *Epididymis: Normal.  *Hydrocele: None.  *Varicocele: None.     Other findings: None.  Impression:  Bilateral testicular enlargement with markedly heterogeneous echotexture, concerning for infiltrative process such as lymphoma.  Correlation with prior history is recommended as sequela from bilateral remote orchitis may give a similar appearance.  Consultation with urology is recommended.  This report was flagged in Epic as abnormal.     - 3/21/2023 CT C/A/P:  FINDINGS:  BASE OF NECK: No significant abnormality.  Thyroid gland unremarkable.  THORACIC SOFT TISSUES: No axillary adenopathy.  AORTA: Thoracic aorta maintains normal caliber.  Left-sided aortic arch with normal three vessel branching pattern.  No calcific atherosclerosis of the thoracic aorta.  HEART: Normal size with physiologic pericardial fluid.  PULMONARY VASCULATURE: Unremarkable.  EMILY/MEDIASTINUM: No pathologic alexandra enlargement.  AIRWAYS: Trachea and proximal airways remain patent.  LUNGS/PLEURA: Lungs are symmetrically well expanded.  No focal consolidation, mass, pneumothorax, or pleural fluid.  LIVER: Normal in size and contour.  No focal hepatic lesion.  GALLBLADDER: No calcified gallstones.  BILE  DUCTS: No evidence of dilated ducts.  PANCREAS: No mass or peripancreatic fat stranding.  SPLEEN: No splenomegaly.  ADRENALS: Unremarkable.  KIDNEYS/URETERS: Normal in size and location with normal enhancement.  No hydronephrosis or nephrolithiasis. No ureteral dilatation.  BLADDER: No evidence of wall thickening.  REPRODUCTIVE ORGANS: Heterogeneous 6.5 x 6.2 cm mass-like enlargement of right testicle, correlate with 03/20/2023 ultrasound.  STOMACH/DUODENUM: Unremarkable.  BOWEL/MESENTERY: Small bowel is normal in caliber with no evidence of obstruction. No evidence of inflammation or wall thickening.  Colon demonstrates no focal wall thickening.  Appendix is visualized and appears normal.  PERITONEUM: No intraperitoneal free air or fluid.  LYMPH NODES: Enlarged 1.1 cm pericaval node (axial 3:99).  No pelvic or inguinal lymphadenopathy.  VASCULATURE: No significant calcific atherosclerosis.  Portal venous system is patent.  ABDOMINAL WALL:  Small fat containing umbilical hernia.  BONES: No acute fracture. No suspicious osseous lesions.  Impression:  1. Enlarged 1.1 cm pericaval lymph node.  No additional enlarged lymph nodes throughout the abdomen.  No pelvic or inguinal lymphadenopathy.  2. Heterogeneous 6.5 x 6.2 cm mass-like enlargement of right testicle, correlate with 03/20/2023 ultrasound.  3. Other findings above.  This report was flagged in Epic as abnormal.     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   - See CAP synoptic report below.   CASE SUMMARY: (TESTIS: Radical Orchiectomy)   Standard(s): AJCC-UICC 8   SPECIMEN   Specimen Laterality: Right.   Tumor Focality: Unifocal.   Tumor Size: Greatest dimension of main tumor mass in Centimeters (cm): 6.7 cm.   Histologic Type: Mixed germ cell tumor.   Embryonal carcinoma (specify percentage): 50 %   Yolk sac tumor, postpubertal type (specify percentage): 40 %    Choriocarcinoma (specify percentage): 10 %   Tumor Extent: Invades rete testis.   Lymphovascular Invasion: Present.   Margin Status: All margins negative for tumor.   Regional Lymph Node Status: Not applicable (no regional lymph nodes submitted or found).   Distant Site: Not applicable.   PATHOLOGIC STAGE CLASSIFICATION (pTNM, AJCC 8th Edition): pT2 pN not assigned (no nodes submitted or found).   Blocks for potential molecular or ancillary studies:   Tumor block: 1I or 1L.      PMH:  None     FH:  Mother living at age 53 yo, healthy overall  Father, living at age 53 yo, healthy  1 brother- Type 1 DM- 23 yo  1 sister- healthy  No major health issues known extended family     SH:  Apartment work, maintenance A/C  Good support system  No EtOH (Rare social)  No tobacco    Review of Systems  No changes     Objective     Physical Exam  No changes     Assessment and Plan     Problem List Items Addressed This Visit       Malignant neoplasm of descended right testis - Primary       We reviewed need for chemotherapy-  Plan is for BEP x 3  Audiology assessment  PFTs appropriate  He also needs MRI liver and T spine for further clarification of these issues    Yuridia Salguero- mother- I will call her as she was unable to attend appt and review above per patient request    Breast tenderness- likely hormonal, monitor    Return for chemo counseling and start    Route Chart for Scheduling    Med Onc Chart Routing  Urgent    Follow up with physician . Cbc, cmp 5/29, 6/5 and chemo- EP on 6/12/2023 with cbc, cmp and chemo and cbc, cmp chemo again on 6/19 and 6/26   Follow up with GREGORY . See [facility] next week for chemo teaching- see audiology asap- start BEP following week 5/22, needs cbc, cmp bhcg ldh and afp same day as sees her   Infusion scheduling note    Injection scheduling note chemo 5/22- 5/26, 5/29, 6/5   Labs    Imaging    Pharmacy appointment    Other referrals           Treatment Plan Information   OP TESTICULAR BEP 5-DAY Q3W    Zenobia Alston MD   Upcoming Treatment Dates - OP TESTICULAR BEP 5-DAY Q3W    5/22/2023       Pre-Medications       APREPITANT 7.2 MG/ML IV EMUL       PALONOSETRON 0.25MG/DEXAMETHASONE 10MG ORDERABLE       Chemotherapy       CISplatin (Platinol) 20 mg/m2 = 43 mg in sodium chloride 0.9% 293 mL chemo infusion       etoposide (VEPESID) 100 mg/m2 = 216 mg in sodium chloride 0.9% 510.8 mL chemo infusion       bleomycin (BLEOCIN) 30 Units in sodium chloride 0.9% 110 mL chemo infusion       Pre-Hydration       SODIUM CHLORIDE 0.9 % IV BOLUS       Post-Hydration       SODIUM CHLORIDE 0.9 % IV BOLUS  5/23/2023       Pre-Medications       DEXAMETHASONE ORDERABLE       ONDANSETRON HCL  4 MG ORAL TAB       PROMETHAZINE 25 MG ORAL TAB       Chemotherapy       CISplatin (Platinol) 20 mg/m2 = 43 mg in sodium chloride 0.9% 543 mL chemo infusion       etoposide (VEPESID) 100 mg/m2 = 216 mg in sodium chloride 0.9% 510.8 mL chemo infusion       Pre-Hydration       SODIUM CHLORIDE 0.9 % IV BOLUS       Post-Hydration       SODIUM CHLORIDE 0.9 % IV BOLUS  5/24/2023       Pre-Medications       DEXAMETHASONE ORDERABLE       ONDANSETRON HCL  4 MG ORAL TAB       PROMETHAZINE 25 MG ORAL TAB       Chemotherapy       CISplatin (Platinol) 20 mg/m2 = 43 mg in sodium chloride 0.9% 543 mL chemo infusion       etoposide (VEPESID) 100 mg/m2 = 216 mg in sodium chloride 0.9% 510.8 mL chemo infusion       Pre-Hydration       SODIUM CHLORIDE 0.9 % IV BOLUS       Post-Hydration       SODIUM CHLORIDE 0.9 % IV BOLUS  5/25/2023       Pre-Medications       PALONOSETRON 0.25MG/DEXAMETHASONE 10MG ORDERABLE       Chemotherapy       CISplatin (Platinol) 20 mg/m2 = 43 mg in sodium chloride 0.9% 543 mL chemo infusion       etoposide (VEPESID) 100 mg/m2 = 216 mg in sodium chloride 0.9% 510.8 mL chemo infusion       Pre-Hydration       SODIUM CHLORIDE 0.9 % IV BOLUS       Post-Hydration       SODIUM CHLORIDE 0.9 % IV BOLUS     30 minutes total

## 2023-05-10 NOTE — PLAN OF CARE
START ON PATHWAY REGIMEN - Testicular    [id number]        Bleomycin       Etoposide       Cisplatin           Additional Orders: Antiemetic ref: Mike et al. [facility]   (2016) 24; [id number].    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

## 2023-05-11 ENCOUNTER — PATIENT MESSAGE (OUTPATIENT)
Dept: HEMATOLOGY/ONCOLOGY | Facility: CLINIC | Age: 28
End: 2023-05-11
Payer: COMMERCIAL

## 2023-05-11 DIAGNOSIS — C62.11 MALIGNANT NEOPLASM OF DESCENDED RIGHT TESTIS: Primary | ICD-10-CM

## 2023-05-18 ENCOUNTER — PATIENT MESSAGE (OUTPATIENT)
Dept: ADMINISTRATIVE | Facility: OTHER | Age: 28
End: 2023-05-18
Payer: COMMERCIAL

## 2023-05-18 ENCOUNTER — LAB VISIT (OUTPATIENT)
Dept: LAB | Facility: HOSPITAL | Age: 28
End: 2023-05-18
Attending: INTERNAL MEDICINE
Payer: COMMERCIAL

## 2023-05-18 ENCOUNTER — PATIENT MESSAGE (OUTPATIENT)
Dept: HEMATOLOGY/ONCOLOGY | Facility: CLINIC | Age: 28
End: 2023-05-18

## 2023-05-18 ENCOUNTER — CLINICAL SUPPORT (OUTPATIENT)
Dept: HEMATOLOGY/ONCOLOGY | Facility: CLINIC | Age: 28
End: 2023-05-18
Payer: COMMERCIAL

## 2023-05-18 VITALS
TEMPERATURE: 98 F | OXYGEN SATURATION: 98 % | HEART RATE: 81 BPM | SYSTOLIC BLOOD PRESSURE: 126 MMHG | WEIGHT: 210.19 LBS | HEIGHT: 76 IN | DIASTOLIC BLOOD PRESSURE: 71 MMHG | RESPIRATION RATE: 18 BRPM | BODY MASS INDEX: 25.6 KG/M2

## 2023-05-18 DIAGNOSIS — C62.11 MALIGNANT NEOPLASM OF DESCENDED RIGHT TESTIS: ICD-10-CM

## 2023-05-18 DIAGNOSIS — C62.11 MALIGNANT NEOPLASM OF DESCENDED RIGHT TESTIS: Primary | ICD-10-CM

## 2023-05-18 LAB
ALBUMIN SERPL BCP-MCNC: 4 G/DL (ref 3.5–5.2)
ALP SERPL-CCNC: 60 U/L (ref 55–135)
ALT SERPL W/O P-5'-P-CCNC: 23 U/L (ref 10–44)
ANION GAP SERPL CALC-SCNC: 7 MMOL/L (ref 8–16)
AST SERPL-CCNC: 26 U/L (ref 10–40)
BASOPHILS # BLD AUTO: 0.03 K/UL (ref 0–0.2)
BASOPHILS NFR BLD: 0.5 % (ref 0–1.9)
BILIRUB SERPL-MCNC: 0.5 MG/DL (ref 0.1–1)
BUN SERPL-MCNC: 10 MG/DL (ref 6–20)
CALCIUM SERPL-MCNC: 9 MG/DL (ref 8.7–10.5)
CHLORIDE SERPL-SCNC: 106 MMOL/L (ref 95–110)
CO2 SERPL-SCNC: 25 MMOL/L (ref 23–29)
CREAT SERPL-MCNC: 1.1 MG/DL (ref 0.5–1.4)
DIFFERENTIAL METHOD: ABNORMAL
EOSINOPHIL # BLD AUTO: 0.1 K/UL (ref 0–0.5)
EOSINOPHIL NFR BLD: 1.5 % (ref 0–8)
ERYTHROCYTE [DISTWIDTH] IN BLOOD BY AUTOMATED COUNT: 15.6 % (ref 11.5–14.5)
EST. GFR  (NO RACE VARIABLE): >60 ML/MIN/1.73 M^2
GLUCOSE SERPL-MCNC: 112 MG/DL (ref 70–110)
HCT VFR BLD AUTO: 44.8 % (ref 40–54)
HGB BLD-MCNC: 14 G/DL (ref 14–18)
IMM GRANULOCYTES # BLD AUTO: 0.02 K/UL (ref 0–0.04)
IMM GRANULOCYTES NFR BLD AUTO: 0.3 % (ref 0–0.5)
LYMPHOCYTES # BLD AUTO: 2.3 K/UL (ref 1–4.8)
LYMPHOCYTES NFR BLD: 38.6 % (ref 18–48)
MCH RBC QN AUTO: 26.8 PG (ref 27–31)
MCHC RBC AUTO-ENTMCNC: 31.3 G/DL (ref 32–36)
MCV RBC AUTO: 86 FL (ref 82–98)
MONOCYTES # BLD AUTO: 0.5 K/UL (ref 0.3–1)
MONOCYTES NFR BLD: 9.1 % (ref 4–15)
NEUTROPHILS # BLD AUTO: 3 K/UL (ref 1.8–7.7)
NEUTROPHILS NFR BLD: 50 % (ref 38–73)
NRBC BLD-RTO: 0 /100 WBC
PLATELET # BLD AUTO: 309 K/UL (ref 150–450)
PMV BLD AUTO: 10.7 FL (ref 9.2–12.9)
POTASSIUM SERPL-SCNC: 4 MMOL/L (ref 3.5–5.1)
PROT SERPL-MCNC: 6.7 G/DL (ref 6–8.4)
RBC # BLD AUTO: 5.23 M/UL (ref 4.6–6.2)
SODIUM SERPL-SCNC: 138 MMOL/L (ref 136–145)
WBC # BLD AUTO: 5.91 K/UL (ref 3.9–12.7)

## 2023-05-18 PROCEDURE — 99999 PR PBB SHADOW E&M-EST. PATIENT-LVL IV: CPT | Mod: PBBFAC,,, | Performed by: REGISTERED NURSE

## 2023-05-18 PROCEDURE — 99215 PR OFFICE/OUTPT VISIT, EST, LEVL V, 40-54 MIN: ICD-10-PCS | Mod: S$GLB,,, | Performed by: REGISTERED NURSE

## 2023-05-18 PROCEDURE — 85025 COMPLETE CBC W/AUTO DIFF WBC: CPT | Performed by: INTERNAL MEDICINE

## 2023-05-18 PROCEDURE — 36415 COLL VENOUS BLD VENIPUNCTURE: CPT | Performed by: INTERNAL MEDICINE

## 2023-05-18 PROCEDURE — 99215 OFFICE O/P EST HI 40 MIN: CPT | Mod: S$GLB,,, | Performed by: REGISTERED NURSE

## 2023-05-18 PROCEDURE — 80053 COMPREHEN METABOLIC PANEL: CPT | Performed by: INTERNAL MEDICINE

## 2023-05-18 PROCEDURE — 99999 PR PBB SHADOW E&M-EST. PATIENT-LVL IV: ICD-10-PCS | Mod: PBBFAC,,, | Performed by: REGISTERED NURSE

## 2023-05-18 RX ORDER — ONDANSETRON 8 MG/1
8 TABLET, ORALLY DISINTEGRATING ORAL EVERY 8 HOURS PRN
Qty: 30 TABLET | Refills: 6 | Status: ON HOLD | OUTPATIENT
Start: 2023-05-18 | End: 2023-07-20

## 2023-05-18 RX ORDER — DEXAMETHASONE 4 MG/1
TABLET ORAL
Qty: 120 TABLET | Refills: 0 | Status: SHIPPED | OUTPATIENT
Start: 2023-05-18

## 2023-05-18 RX ORDER — PROMETHAZINE HYDROCHLORIDE 25 MG/1
25 TABLET ORAL EVERY 6 HOURS PRN
Qty: 30 TABLET | Refills: 3 | Status: SHIPPED | OUTPATIENT
Start: 2023-05-18

## 2023-05-18 NOTE — PROGRESS NOTES
Arturo Salguero III was consented for chemotherapy/immunotherapy to treat malignant testicular neoplasm. Arturo Salguero III was consented to receive bleomycin, etoposide, and cisplatin.     The consent was discussed and reviewed with patient, mother, and significant other.     2. Patient was education on what to expect when receiving chemotherapy including: checking in, receiving an ID band, 1 guest allowed in the infusion suite during infusion, can alternate people as well, pole going with you once you are hooked up, warm blankets are available, you may bring lunch and snacks, minimal snacks are available, take medications as regularly unless told otherwise, warm blankets, will be available. Education about what to expect during their chemo cycle and how often their regimen is given.     3. An extensive discussion was had which included a thorough discussion of the risk and benefits of treatment and alternatives.  Risks, including but not limited to, possible hair loss, bone marrow damage (anemia, thrombocytopenia, immune suppression, neutropenia), damage to body organs (brain, heart, liver, kidney, lungs, nervous system, skin, and others), allergic reactions, sterility, nausea/vomiting, constipation/diarrhea, sores in the mouth, secondary cancers, local damage at possible injection sites, and rarely death were all discussed. Specific side effects pertaining to their chemotherapy/immunotherapy medications were discussed as well.The patient agrees with the plan, and all questions and their support system's questions have been answered to their satisfaction. Contraindications and potential side effects discussed as listed in [facility]. Consented the patient to the treatment plan and the patient was educated on the planned duration of the treatment and schedule of the treatment administration.    4. Patient was given binder which includes: contact information for the [facility], an immunotherapy side effect guide  (if applicable to patient), resources including, but not limited to: women's wellness, acupuncture, physical therapy, , urgent care within oncology and financial assistance.     5. Patient was educated on when to call (and given the numbers to call and knows to message via [facility] if possible) or notify the provider including, but not limited to:   Persistent Nausea and/or Vomiting  Dehydration  Persistent Diarrhea  Fever of 100.4 > 1 hour in duration or any isolated fever > 101   Rash (while on active chemotherapy or immunotherapy)   Severe pain or new onset pain not controlled by current medication regimen  Or any other symptom you feel is related to your current hematology or oncology treatment    6. Patient was provided with additional resources that [facility] offers including, but not limited to: financial counseling, , psychologist, palliative care, support groups, transportation, dietician, rehab services, women's wellness and urgent care visits within the oncology department.     7. Patient was offered and signed up for chemo care companion. Educated on daily vital signs and daily questionnaire.     8. Patient has an established PCP, Dr. Andrade.      9. Patient was offered a phone call 1 week post their Cycle 1 Day 1 chemotherapy and agreed.     10. Advance Care Planning     Date: 05/18/2023    [facility]  I engaged the patient and family in a conversation about advance care planning and we specifically addressed what the goals of care would be moving forward, in light of the patient's change in clinical status, specifically new diagnosis of testicular cancer. We did not specifically address the patient's likely prognosis, which is good .  We explored the patient's values and preferences for future care.  The patient and family endorses that what is most important right now is to focus on remaining as independent as possible, symptom/pain control, and comfort and QOL     Accordingly, we  have decided that the best plan to meet the patient's goals includes continuing with treatment    I did not explain the role for hospice care at this stage of the patient's illness, including its ability to help the patient live with the best quality of life possible.  We will not be making a hospice referral.    ACP booklet reviewed with patient and family. Encouraged to complete forms and return for upload into chart.     I spent a total of 10 minutes engaging the patient in this advance care planning discussion.      Patient will Proceed with cycle 1 day 1 of BEP once scheduled.   Patient will be called ~1 week for a post chemo visit with GREGORY.     Meds/Orders  Sent zofran, phenergan, dexamethasone to pharmacy today   Lab orders placed previously  MRI spine, abdomen orders placed previously  IR referral placed today for port placement.     Patient is in agreement with the proposed treatment plan. All questions were answered to the patient's satisfaction. Pt knows to call clinic if anything is needed before the next clinic visit.    Patient discussed with collaborating physician, Dr. Alston.    At least 40 minutes were spent today on this encounter including face to face time with the patient, data gathering/interpretation and documentation.       He[facility] Yepez, MSN, APRN, [facility]  Hematology and Medical Oncology  Clinical Nurse Specialist to Dr. Carter, Dr. Alston & Dr. Slater    Route Chart for Scheduling    Med Onc Chart Routing  Urgent    Follow up with physician . Please schedule scans (MRI spine, MRI abdomen) ASAP. Needs to see audiology and get port placed prior to starting chemo. Please schedule chemo start ASAP - needs d1-5, d8, d15 x 3 cycles with labs (CBC,CMP) and provider visit (alternate [facility]/Dr. Alston) prior to d1 of each cycle.   Follow up with GREGORY    Infusion scheduling note    Injection scheduling note    Labs   Scheduling:  Preferred lab:  Lab interval:  Please add these labs - bhcg ldh and afp  - to cbc,cmp appointment prior to cycle 1 treatment   Imaging    Pharmacy appointment    Other referrals           Treatment Plan Information   OP TESTICULAR BEP 5-DAY Q3W   Zenobia Alston MD   Upcoming Treatment Dates - OP TESTICULAR BEP 5-DAY Q3W    5/22/2023       Pre-Medications       aprepitant (CINVANTI) injection 130 mg       palonosetron (ALOXI) 0.25 mg with Dexamethasone (DECADRON) 10 mg in NS 50 mL IVPB       Chemotherapy       CISplatin (Platinol) 20 mg/m2 = 43 mg in sodium chloride 0.9% 293 mL chemo infusion       etoposide (VEPESID) 100 mg/m2 = 216 mg in sodium chloride 0.9% 510.8 mL chemo infusion       bleomycin (BLEOCIN) 30 Units in sodium chloride 0.9% 110 mL chemo infusion       Pre-Hydration       sodium chloride 0.9% bolus 500 mL 500 mL       Post-Hydration       sodium chloride 0.9% bolus 500 mL 500 mL  5/23/2023       Pre-Medications       dexAMETHasone (DECADRON) 10 mg in sodium chloride 0.9% 50 mL IVPB       prochlorperazine injection Soln 10 mg       Chemotherapy       CISplatin (Platinol) 20 mg/m2 = 43 mg in sodium chloride 0.9% 543 mL chemo infusion       etoposide (VEPESID) 100 mg/m2 = 216 mg in sodium chloride 0.9% 510.8 mL chemo infusion       Pre-Hydration       sodium chloride 0.9% bolus 500 mL 500 mL       Post-Hydration       sodium chloride 0.9% bolus 500 mL 500 mL  5/24/2023       Pre-Medications       dexAMETHasone (DECADRON) 10 mg in sodium chloride 0.9% 50 mL IVPB       prochlorperazine injection Soln 10 mg       Chemotherapy       CISplatin (Platinol) 20 mg/m2 = 43 mg in sodium chloride 0.9% 543 mL chemo infusion       etoposide (VEPESID) 100 mg/m2 = 216 mg in sodium chloride 0.9% 510.8 mL chemo infusion       Pre-Hydration       sodium chloride 0.9% bolus 500 mL 500 mL       Post-Hydration       sodium chloride 0.9% bolus 500 mL 500 mL  5/25/2023       Pre-Medications       palonosetron (ALOXI) 0.25 mg with Dexamethasone (DECADRON) 10 mg in NS 50 mL IVPB       Chemotherapy        CISplatin (Platinol) 20 mg/m2 = 43 mg in sodium chloride 0.9% 543 mL chemo infusion       etoposide (VEPESID) 100 mg/m2 = 216 mg in sodium chloride 0.9% 510.8 mL chemo infusion       Pre-Hydration       sodium chloride 0.9% bolus 500 mL 500 mL       Post-Hydration       sodium chloride 0.9% bolus 500 mL 500 mL

## 2023-05-19 ENCOUNTER — LAB VISIT (OUTPATIENT)
Dept: LAB | Facility: HOSPITAL | Age: 28
End: 2023-05-19
Attending: INTERNAL MEDICINE
Payer: COMMERCIAL

## 2023-05-19 DIAGNOSIS — C62.11 MALIGNANT NEOPLASM OF DESCENDED RIGHT TESTIS: ICD-10-CM

## 2023-05-19 LAB
AFP SERPL-MCNC: 41 NG/ML (ref 0–8.4)
ALBUMIN SERPL BCP-MCNC: 4.2 G/DL (ref 3.5–5.2)
ALP SERPL-CCNC: 65 U/L (ref 55–135)
ALT SERPL W/O P-5'-P-CCNC: 22 U/L (ref 10–44)
ANION GAP SERPL CALC-SCNC: 7 MMOL/L (ref 8–16)
AST SERPL-CCNC: 25 U/L (ref 10–40)
BASOPHILS # BLD AUTO: 0.02 K/UL (ref 0–0.2)
BASOPHILS NFR BLD: 0.3 % (ref 0–1.9)
BILIRUB SERPL-MCNC: 0.5 MG/DL (ref 0.1–1)
BUN SERPL-MCNC: 11 MG/DL (ref 6–20)
CALCIUM SERPL-MCNC: 9.3 MG/DL (ref 8.7–10.5)
CHLORIDE SERPL-SCNC: 105 MMOL/L (ref 95–110)
CO2 SERPL-SCNC: 26 MMOL/L (ref 23–29)
CREAT SERPL-MCNC: 1 MG/DL (ref 0.5–1.4)
DIFFERENTIAL METHOD: ABNORMAL
EOSINOPHIL # BLD AUTO: 0.1 K/UL (ref 0–0.5)
EOSINOPHIL NFR BLD: 1.6 % (ref 0–8)
ERYTHROCYTE [DISTWIDTH] IN BLOOD BY AUTOMATED COUNT: 15.8 % (ref 11.5–14.5)
EST. GFR  (NO RACE VARIABLE): >60 ML/MIN/1.73 M^2
GLUCOSE SERPL-MCNC: 89 MG/DL (ref 70–110)
HCT VFR BLD AUTO: 46.8 % (ref 40–54)
HGB BLD-MCNC: 14.3 G/DL (ref 14–18)
IMM GRANULOCYTES # BLD AUTO: 0.02 K/UL (ref 0–0.04)
IMM GRANULOCYTES NFR BLD AUTO: 0.3 % (ref 0–0.5)
LDH SERPL L TO P-CCNC: 169 U/L (ref 110–260)
LYMPHOCYTES # BLD AUTO: 2.4 K/UL (ref 1–4.8)
LYMPHOCYTES NFR BLD: 38.3 % (ref 18–48)
MCH RBC QN AUTO: 26.5 PG (ref 27–31)
MCHC RBC AUTO-ENTMCNC: 30.6 G/DL (ref 32–36)
MCV RBC AUTO: 87 FL (ref 82–98)
MONOCYTES # BLD AUTO: 0.7 K/UL (ref 0.3–1)
MONOCYTES NFR BLD: 10.5 % (ref 4–15)
NEUTROPHILS # BLD AUTO: 3 K/UL (ref 1.8–7.7)
NEUTROPHILS NFR BLD: 49 % (ref 38–73)
NRBC BLD-RTO: 0 /100 WBC
PLATELET # BLD AUTO: 315 K/UL (ref 150–450)
PMV BLD AUTO: 11.2 FL (ref 9.2–12.9)
POTASSIUM SERPL-SCNC: 4.2 MMOL/L (ref 3.5–5.1)
PROT SERPL-MCNC: 7.1 G/DL (ref 6–8.4)
RBC # BLD AUTO: 5.39 M/UL (ref 4.6–6.2)
SODIUM SERPL-SCNC: 138 MMOL/L (ref 136–145)
WBC # BLD AUTO: 6.18 K/UL (ref 3.9–12.7)

## 2023-05-19 PROCEDURE — 85025 COMPLETE CBC W/AUTO DIFF WBC: CPT | Performed by: INTERNAL MEDICINE

## 2023-05-19 PROCEDURE — 84702 CHORIONIC GONADOTROPIN TEST: CPT | Performed by: INTERNAL MEDICINE

## 2023-05-19 PROCEDURE — 82105 ALPHA-FETOPROTEIN SERUM: CPT | Performed by: INTERNAL MEDICINE

## 2023-05-19 PROCEDURE — 83615 LACTATE (LD) (LDH) ENZYME: CPT | Performed by: INTERNAL MEDICINE

## 2023-05-19 PROCEDURE — 80053 COMPREHEN METABOLIC PANEL: CPT | Performed by: INTERNAL MEDICINE

## 2023-05-20 LAB — B-HCG SERPL-ACNC: 202 IU/L

## 2023-05-22 ENCOUNTER — CLINICAL SUPPORT (OUTPATIENT)
Dept: OTOLARYNGOLOGY | Facility: CLINIC | Age: 28
End: 2023-05-22
Payer: COMMERCIAL

## 2023-05-22 DIAGNOSIS — H93.293 ABNORMAL AUDITORY PERCEPTION, BILATERAL: Primary | ICD-10-CM

## 2023-05-22 PROCEDURE — 99999 PR PBB SHADOW E&M-EST. PATIENT-LVL I: ICD-10-PCS | Mod: PBBFAC,,, | Performed by: AUDIOLOGIST

## 2023-05-22 PROCEDURE — 92552 PR PURE TONE AUDIOMETRY, AIR: ICD-10-PCS | Mod: S$GLB,,, | Performed by: AUDIOLOGIST

## 2023-05-22 PROCEDURE — 92567 TYMPANOMETRY: CPT | Mod: S$GLB,,, | Performed by: AUDIOLOGIST

## 2023-05-22 PROCEDURE — 92567 PR TYMPA2METRY: ICD-10-PCS | Mod: S$GLB,,, | Performed by: AUDIOLOGIST

## 2023-05-22 PROCEDURE — 92556 SPEECH AUDIOMETRY COMPLETE: CPT | Mod: S$GLB,,, | Performed by: AUDIOLOGIST

## 2023-05-22 PROCEDURE — 92587 PR EVOKED AUDITORY TEST,LIMITED: ICD-10-PCS | Mod: S$GLB,,, | Performed by: AUDIOLOGIST

## 2023-05-22 PROCEDURE — 92552 PURE TONE AUDIOMETRY AIR: CPT | Mod: S$GLB,,, | Performed by: AUDIOLOGIST

## 2023-05-22 PROCEDURE — 99999 PR PBB SHADOW E&M-EST. PATIENT-LVL I: CPT | Mod: PBBFAC,,, | Performed by: AUDIOLOGIST

## 2023-05-22 PROCEDURE — 92556 PR SPEECH AUDIOMETRY, COMPLETE: ICD-10-PCS | Mod: S$GLB,,, | Performed by: AUDIOLOGIST

## 2023-05-22 NOTE — PROGRESS NOTES
Arturo Salguero III was seen today in the clinic for a baseline audiologic evaluation prior to beginning chemotherapy.    Tympanometry revealed a Type A tympanogram for both ears.  Audiogram results revealed hearing within normal limits bilaterally.  Speech reception thresholds were obtained at 0dB for both ears and speech discrimination scores were 100% for both ears.  OAEs were present and robust at all frequencies in the left ear and present and robust for all frequencies except for 2000Hz for the right ear.    Recommendations:  Otologic evaluation  Follow up hearing test as ordered by oncology  Hearing protection in noise

## 2023-05-23 ENCOUNTER — PATIENT MESSAGE (OUTPATIENT)
Dept: HEMATOLOGY/ONCOLOGY | Facility: CLINIC | Age: 28
End: 2023-05-23
Payer: COMMERCIAL

## 2023-05-25 ENCOUNTER — TELEPHONE (OUTPATIENT)
Dept: INTERVENTIONAL RADIOLOGY/VASCULAR | Facility: CLINIC | Age: 28
End: 2023-05-25
Payer: COMMERCIAL

## 2023-05-25 NOTE — TELEPHONE ENCOUNTER
Spoke to pt on phone, Pt is scheduled on 5/26/2023 for IR procedure at [facility].  Preop instructions given (NPO after midnight, MUST have a ride home, Nurse will call 1-2  days before to go over instructions and medications),pt verbally understood. Pt aware and confirmed, Thanks

## 2023-05-26 ENCOUNTER — HOSPITAL ENCOUNTER (OUTPATIENT)
Dept: INTERVENTIONAL RADIOLOGY/VASCULAR | Facility: HOSPITAL | Age: 28
Discharge: HOME | End: 2023-05-26
Attending: REGISTERED NURSE
Payer: COMMERCIAL

## 2023-05-26 VITALS
TEMPERATURE: 99 F | RESPIRATION RATE: 18 BRPM | HEART RATE: 72 BPM | DIASTOLIC BLOOD PRESSURE: 72 MMHG | OXYGEN SATURATION: 98 % | SYSTOLIC BLOOD PRESSURE: 143 MMHG

## 2023-05-26 DIAGNOSIS — C62.11 MALIGNANT NEOPLASM OF DESCENDED RIGHT TESTIS: ICD-10-CM

## 2023-05-26 PROCEDURE — 36561 INSERT TUNNELED CV CATH: CPT

## 2023-05-26 PROCEDURE — 36561 INSERT TUNNELED CV CATH: CPT | Mod: RT,,, | Performed by: RADIOLOGY

## 2023-05-26 PROCEDURE — C1788 PORT, INDWELLING, IMP: HCPCS

## 2023-05-26 PROCEDURE — 99152 PR MOD CONSCIOUS SEDATION, SAME PHYS, 5+ YRS, FIRST 15 MIN: ICD-10-PCS | Mod: ,,, | Performed by: RADIOLOGY

## 2023-05-26 PROCEDURE — 99153 MOD SED SAME PHYS/QHP EA: CPT | Performed by: RADIOLOGY

## 2023-05-26 PROCEDURE — 99153 MOD SED SAME PHYS/QHP EA: CPT

## 2023-05-26 PROCEDURE — 77001 FLUOROGUIDE FOR VEIN DEVICE: CPT | Mod: 26,,, | Performed by: RADIOLOGY

## 2023-05-26 PROCEDURE — A4550 SURGICAL TRAYS: HCPCS

## 2023-05-26 PROCEDURE — 77001 FLUOROGUIDE FOR VEIN DEVICE: CPT | Mod: TC | Performed by: RADIOLOGY

## 2023-05-26 PROCEDURE — 76937 US GUIDE VASCULAR ACCESS: CPT | Mod: TC | Performed by: RADIOLOGY

## 2023-05-26 PROCEDURE — 76937 IR ULTRASOUND GUIDANCE: ICD-10-PCS | Mod: 26,,, | Performed by: RADIOLOGY

## 2023-05-26 PROCEDURE — 63600175 PHARM REV CODE 636 W HCPCS: Performed by: RADIOLOGY

## 2023-05-26 PROCEDURE — 99152 MOD SED SAME PHYS/QHP 5/>YRS: CPT | Performed by: RADIOLOGY

## 2023-05-26 PROCEDURE — 36561 IR TUNNELED CATH PLACEMENT WITH PORT: ICD-10-PCS | Mod: RT,,, | Performed by: RADIOLOGY

## 2023-05-26 PROCEDURE — 99152 MOD SED SAME PHYS/QHP 5/>YRS: CPT | Mod: ,,, | Performed by: RADIOLOGY

## 2023-05-26 PROCEDURE — 99152 MOD SED SAME PHYS/QHP 5/>YRS: CPT

## 2023-05-26 PROCEDURE — 77001 CHG FLUOROGUIDE CNTRL VEN ACCESS,PLACE,REPLACE,REMOVE: ICD-10-PCS | Mod: 26,,, | Performed by: RADIOLOGY

## 2023-05-26 RX ORDER — CEFAZOLIN SODIUM 1 G/50ML
SOLUTION INTRAVENOUS
Status: COMPLETED | OUTPATIENT
Start: 2023-05-26 | End: 2023-05-26

## 2023-05-26 RX ORDER — MIDAZOLAM HYDROCHLORIDE 1 MG/ML
INJECTION INTRAMUSCULAR; INTRAVENOUS
Status: COMPLETED | OUTPATIENT
Start: 2023-05-26 | End: 2023-05-26

## 2023-05-26 RX ORDER — FENTANYL CITRATE 50 UG/ML
INJECTION, SOLUTION INTRAMUSCULAR; INTRAVENOUS
Status: COMPLETED | OUTPATIENT
Start: 2023-05-26 | End: 2023-05-26

## 2023-05-26 RX ORDER — HEPARIN SODIUM 1000 [USP'U]/ML
INJECTION, SOLUTION INTRAVENOUS; SUBCUTANEOUS
Status: COMPLETED | OUTPATIENT
Start: 2023-05-26 | End: 2023-05-26

## 2023-05-26 RX ADMIN — MIDAZOLAM HYDROCHLORIDE 1 MG: 1 INJECTION, SOLUTION INTRAMUSCULAR; INTRAVENOUS at 10:05

## 2023-05-26 RX ADMIN — FENTANYL CITRATE 50 MCG: 50 INJECTION, SOLUTION INTRAMUSCULAR; INTRAVENOUS at 10:05

## 2023-05-26 RX ADMIN — CEFAZOLIN SODIUM 2 G: 1 SOLUTION INTRAVENOUS at 10:05

## 2023-05-26 RX ADMIN — HEPARIN SODIUM 500 UNITS: 1000 INJECTION, SOLUTION INTRAVENOUS; SUBCUTANEOUS at 11:05

## 2023-05-26 RX ADMIN — FENTANYL CITRATE 50 MCG: 50 INJECTION, SOLUTION INTRAMUSCULAR; INTRAVENOUS at 11:05

## 2023-05-26 RX ADMIN — MIDAZOLAM HYDROCHLORIDE 1 MG: 1 INJECTION, SOLUTION INTRAMUSCULAR; INTRAVENOUS at 11:05

## 2023-05-26 NOTE — H&P
Radiology History & Physical      SUBJECTIVE:     Chief Complaint: testicular cancer    History of Present Illness:  Arturo Salguero III is a 27 y.o. male who presents for port placement  No past medical history on file.  Past Surgical History:   Procedure Laterality Date    ORCHIECTOMY, RADICAL, INGUINAL APPROACH Right 3/31/2023    Procedure: ORCHIECTOMY, RADICAL, INGUINAL APPROACH;  Surgeon: Perico Shah MD;  Location: [facility] OR [address] [place] [address];  Service: Urology;  Laterality: Right;  90 minutes       Home Meds:   Prior to Admission medications    Medication Sig Start Date End Date Taking? Authorizing Provider   dexAMETHasone (DECADRON) 4 MG Tab Take 1 tablet (4 mg total) by mouth every 12 (twelve) hours as needed (take every 12 hours for 3 days following the chemotherapy with cisplatin) 5/18/23   Heaven Yepez, CNS   ondansetron (ZOFRAN-ODT) 8 MG TbDL Take 1 tablet (8 mg total) by mouth every 8 (eight) hours as needed (nausea). Take every 8 hours around the clock for 3 days after chemotherapy. After that, can take every 8 hours as needed for nausea. 5/18/23   Heaven Yepez, CNS   oxyCODONE (ROXICODONE) 5 MG immediate release tablet Take 1 tablet (5 mg total) by mouth every 4 (four) hours as needed for Pain.  Patient not taking: Reported on 4/25/2023 3/31/23   Dez Stewart MD   promethazine (PHENERGAN) 25 MG tablet Take 1 tablet (25 mg total) by mouth every 6 (six) hours as needed for Nausea. (For breakthrough nausea if zofran is not working) 5/18/23   He[facility] Yepez, CNS     Anticoagulants/Antiplatelets: no anticoagulation    Allergies:   Review of patient's allergies indicates:   Allergen Reactions    Amoxicillin      Sedation History:  no adverse reactions    Review of Systems:   Hematological: no known coagulopathies  Respiratory: no shortness of breath  Cardiovascular: no chest pain  Gastrointestinal: no abdominal pain  Genito-Urinary: no dysuria  Musculoskeletal: negative  Neurological: no TIA or  stroke symptoms         OBJECTIVE:     Vital Signs (Most Recent)  Temp: 98 °F (36.7 °C) (05/26/23 0942)  Pulse: 70 (05/26/23 0942)  Resp: 16 (05/26/23 0942)  BP: (!) 143/65 (05/26/23 0942)  SpO2: 96 % (05/26/23 0942)    Physical Exam:  ASA: 1  Mallampati: 2    General: no acute distress  Mental Status: alert and oriented to person, place and time  HEENT: normocephalic, atraumatic  Chest: unlabored breathing  Heart: regular heart rate  Abdomen: nondistended  Extremity: moves all extremities    Laboratory  Lab Results   Component Value Date    INR 1.0 03/15/2023       Lab Results   Component Value Date    WBC 6.18 05/19/2023    HGB 14.3 05/19/2023    HCT 46.8 05/19/2023    MCV 87 05/19/2023     05/19/2023      Lab Results   Component Value Date    GLU 89 05/19/2023     05/19/2023    K 4.2 05/19/2023     05/19/2023    CO2 26 05/19/2023    BUN 11 05/19/2023    CREATININE 1.0 05/19/2023    CALCIUM 9.3 05/19/2023    ALT 22 05/19/2023    AST 25 05/19/2023    ALBUMIN 4.2 05/19/2023    BILITOT 0.5 05/19/2023       ASSESSMENT/PLAN:     Sedation Plan: moderate  Patient will undergo port placement.    Regis Hatch MD  Staff Radiologist  Department of Radiology  Pager: [phone]

## 2023-05-26 NOTE — DISCHARGE INSTRUCTIONS
BATHING:  You may shower after the dressing is removed.  DRESSING:  Remove dressing after 2 days.        ACTIVITY LEVEL: If you have received sedation or an anesthetic, you may feel sleepy for several hours. Rest until you are more awake. Gradually resume your normal activities   No heavy lifting for 4 weeks.      DIET: You may resume your home diet. If nausea is present, increase your diet gradually with fluids and bland foods.    Medications: Pain medication should be taken only if needed and as directed. If antibiotics are prescribed, the medication should be taken until completed. You will be given an updated list of you medications.  No driving, alcoholic beverages or signing legal documents for next 24 hours if you have had sedation, or while taking pain medication    CALL THE DOCTOR:   For any obvious bleeding (some dried blood over the incision is normal).     Redness, swelling, foul smell around incision or fever over 101.  Shortness of breath.  Persistent pain or nausea not relieved by medication.  Call  [phone]     to speak with an Interventional Radiologist    If any unusual problems or difficulties occur contact your doctor. If you cannot contact your doctor but feel your signs and symptoms warrant a physicians attention return to the emergency room.

## 2023-05-26 NOTE — PROCEDURES
Radiology Post-Procedure Note    Pre Op Diagnosis: testicular cancer    Post Op Diagnosis: Same    Procedure: PORT placement    Procedure performed by: Regis Hatch MD    Written Informed Consent Obtained: Yes    Specimen Removed: No    Estimated Blood Loss: less than 50     Findings:   Using realtime U/S guidance a 8 Fr port catheter was placed into the right IJ vein with tip of the catheter in the SVC.    Port is ready for use.     Regis Hatch MD  Staff Radiologist  Department of Radiology  Pager: [phone]

## 2023-05-26 NOTE — DISCHARGE SUMMARY
Radiology Discharge Summary      Hospital Course: No complications    Admit Date: 5/26/2023  Discharge Date: 05/26/2023     Instructions Given to Patient: Yes  Diet: Resume prior diet  Activity: activity as tolerated and no driving for today    Description of Condition on Discharge: Stable  Vital Signs (Most Recent): Temp: 98.6 °F (37 °C) (05/26/23 1159)  Pulse: 72 (05/26/23 1200)  Resp: 18 (05/26/23 1200)  BP: (!) 143/72 (05/26/23 1230)  SpO2: 98 % (05/26/23 1200)    Discharge Disposition: Home    Discharge Diagnosis: testicular cancer s/p port placement     Follow-up: with oncology for chemotherapy    Regis Hatch MD  Staff Radiologist  Department of Radiology  Pager: [phone]

## 2023-05-29 ENCOUNTER — LAB VISIT (OUTPATIENT)
Dept: LAB | Facility: HOSPITAL | Age: 28
End: 2023-05-29
Attending: INTERNAL MEDICINE
Payer: COMMERCIAL

## 2023-05-29 ENCOUNTER — INFUSION (OUTPATIENT)
Dept: INFUSION THERAPY | Facility: HOSPITAL | Age: 28
End: 2023-05-29
Attending: INTERNAL MEDICINE
Payer: COMMERCIAL

## 2023-05-29 ENCOUNTER — PATIENT MESSAGE (OUTPATIENT)
Dept: HEMATOLOGY/ONCOLOGY | Facility: CLINIC | Age: 28
End: 2023-05-29
Payer: COMMERCIAL

## 2023-05-29 VITALS
WEIGHT: 209.44 LBS | SYSTOLIC BLOOD PRESSURE: 130 MMHG | DIASTOLIC BLOOD PRESSURE: 85 MMHG | BODY MASS INDEX: 25.49 KG/M2 | RESPIRATION RATE: 18 BRPM | HEART RATE: 83 BPM | TEMPERATURE: 98 F

## 2023-05-29 DIAGNOSIS — C62.11 MALIGNANT NEOPLASM OF DESCENDED RIGHT TESTIS: ICD-10-CM

## 2023-05-29 DIAGNOSIS — C62.11 MALIGNANT NEOPLASM OF DESCENDED RIGHT TESTIS: Primary | ICD-10-CM

## 2023-05-29 LAB
ALBUMIN SERPL BCP-MCNC: 4 G/DL (ref 3.5–5.2)
ALP SERPL-CCNC: 66 U/L (ref 55–135)
ALT SERPL W/O P-5'-P-CCNC: 16 U/L (ref 10–44)
ANION GAP SERPL CALC-SCNC: 10 MMOL/L (ref 8–16)
AST SERPL-CCNC: 22 U/L (ref 10–40)
BASOPHILS # BLD AUTO: 0.03 K/UL (ref 0–0.2)
BASOPHILS NFR BLD: 0.4 % (ref 0–1.9)
BILIRUB SERPL-MCNC: 0.4 MG/DL (ref 0.1–1)
BUN SERPL-MCNC: 13 MG/DL (ref 6–20)
CALCIUM SERPL-MCNC: 9.5 MG/DL (ref 8.7–10.5)
CHLORIDE SERPL-SCNC: 103 MMOL/L (ref 95–110)
CO2 SERPL-SCNC: 26 MMOL/L (ref 23–29)
CREAT SERPL-MCNC: 1.1 MG/DL (ref 0.5–1.4)
DIFFERENTIAL METHOD: ABNORMAL
EOSINOPHIL # BLD AUTO: 0.2 K/UL (ref 0–0.5)
EOSINOPHIL NFR BLD: 2.4 % (ref 0–8)
ERYTHROCYTE [DISTWIDTH] IN BLOOD BY AUTOMATED COUNT: 16.5 % (ref 11.5–14.5)
EST. GFR  (NO RACE VARIABLE): >60 ML/MIN/1.73 M^2
GLUCOSE SERPL-MCNC: 94 MG/DL (ref 70–110)
HCT VFR BLD AUTO: 44.8 % (ref 40–54)
HGB BLD-MCNC: 14.5 G/DL (ref 14–18)
IMM GRANULOCYTES # BLD AUTO: 0.03 K/UL (ref 0–0.04)
IMM GRANULOCYTES NFR BLD AUTO: 0.4 % (ref 0–0.5)
LYMPHOCYTES # BLD AUTO: 2.4 K/UL (ref 1–4.8)
LYMPHOCYTES NFR BLD: 31.7 % (ref 18–48)
MCH RBC QN AUTO: 27.4 PG (ref 27–31)
MCHC RBC AUTO-ENTMCNC: 32.4 G/DL (ref 32–36)
MCV RBC AUTO: 85 FL (ref 82–98)
MONOCYTES # BLD AUTO: 0.8 K/UL (ref 0.3–1)
MONOCYTES NFR BLD: 10.4 % (ref 4–15)
NEUTROPHILS # BLD AUTO: 4.1 K/UL (ref 1.8–7.7)
NEUTROPHILS NFR BLD: 54.7 % (ref 38–73)
NRBC BLD-RTO: 0 /100 WBC
PLATELET # BLD AUTO: 259 K/UL (ref 150–450)
PMV BLD AUTO: 10.1 FL (ref 9.2–12.9)
POTASSIUM SERPL-SCNC: 3.9 MMOL/L (ref 3.5–5.1)
PROT SERPL-MCNC: 7 G/DL (ref 6–8.4)
RBC # BLD AUTO: 5.3 M/UL (ref 4.6–6.2)
SODIUM SERPL-SCNC: 139 MMOL/L (ref 136–145)
WBC # BLD AUTO: 7.5 K/UL (ref 3.9–12.7)

## 2023-05-29 PROCEDURE — 96417 CHEMO IV INFUS EACH ADDL SEQ: CPT

## 2023-05-29 PROCEDURE — 36415 COLL VENOUS BLD VENIPUNCTURE: CPT | Performed by: INTERNAL MEDICINE

## 2023-05-29 PROCEDURE — 80053 COMPREHEN METABOLIC PANEL: CPT | Performed by: INTERNAL MEDICINE

## 2023-05-29 PROCEDURE — A4216 STERILE WATER/SALINE, 10 ML: HCPCS | Performed by: INTERNAL MEDICINE

## 2023-05-29 PROCEDURE — 25000003 PHARM REV CODE 250: Performed by: INTERNAL MEDICINE

## 2023-05-29 PROCEDURE — 96413 CHEMO IV INFUSION 1 HR: CPT

## 2023-05-29 PROCEDURE — 96367 TX/PROPH/DG ADDL SEQ IV INF: CPT

## 2023-05-29 PROCEDURE — 63600175 PHARM REV CODE 636 W HCPCS: Mod: JZ,JG | Performed by: INTERNAL MEDICINE

## 2023-05-29 PROCEDURE — 85025 COMPLETE CBC W/AUTO DIFF WBC: CPT | Performed by: INTERNAL MEDICINE

## 2023-05-29 PROCEDURE — 96375 TX/PRO/DX INJ NEW DRUG ADDON: CPT

## 2023-05-29 RX ORDER — HEPARIN 100 UNIT/ML
500 SYRINGE INTRAVENOUS
Status: CANCELLED | OUTPATIENT
Start: 2023-06-01

## 2023-05-29 RX ORDER — SODIUM CHLORIDE 0.9 % (FLUSH) 0.9 %
10 SYRINGE (ML) INJECTION
Status: CANCELLED | OUTPATIENT
Start: 2023-06-12

## 2023-05-29 RX ORDER — HEPARIN 100 UNIT/ML
500 SYRINGE INTRAVENOUS
Status: CANCELLED | OUTPATIENT
Start: 2023-06-12

## 2023-05-29 RX ORDER — HEPARIN 100 UNIT/ML
500 SYRINGE INTRAVENOUS
Status: CANCELLED | OUTPATIENT
Start: 2023-06-02

## 2023-05-29 RX ORDER — PROCHLORPERAZINE EDISYLATE 5 MG/ML
10 INJECTION INTRAMUSCULAR; INTRAVENOUS
Status: CANCELLED
Start: 2023-05-31

## 2023-05-29 RX ORDER — HEPARIN 100 UNIT/ML
500 SYRINGE INTRAVENOUS
Status: CANCELLED | OUTPATIENT
Start: 2023-06-05

## 2023-05-29 RX ORDER — HEPARIN 100 UNIT/ML
500 SYRINGE INTRAVENOUS
Status: CANCELLED | OUTPATIENT
Start: 2023-05-31

## 2023-05-29 RX ORDER — HEPARIN 100 UNIT/ML
500 SYRINGE INTRAVENOUS
Status: CANCELLED | OUTPATIENT
Start: 2023-05-29

## 2023-05-29 RX ORDER — SODIUM CHLORIDE 0.9 % (FLUSH) 0.9 %
10 SYRINGE (ML) INJECTION
Status: CANCELLED | OUTPATIENT
Start: 2023-05-30

## 2023-05-29 RX ORDER — HEPARIN 100 UNIT/ML
500 SYRINGE INTRAVENOUS
Status: DISCONTINUED | OUTPATIENT
Start: 2023-05-29 | End: 2023-05-29 | Stop reason: HOSPADM

## 2023-05-29 RX ORDER — SODIUM CHLORIDE 0.9 % (FLUSH) 0.9 %
10 SYRINGE (ML) INJECTION
Status: CANCELLED | OUTPATIENT
Start: 2023-06-02

## 2023-05-29 RX ORDER — SODIUM CHLORIDE 0.9 % (FLUSH) 0.9 %
10 SYRINGE (ML) INJECTION
Status: CANCELLED | OUTPATIENT
Start: 2023-05-31

## 2023-05-29 RX ORDER — SODIUM CHLORIDE 0.9 % (FLUSH) 0.9 %
10 SYRINGE (ML) INJECTION
Status: DISCONTINUED | OUTPATIENT
Start: 2023-05-29 | End: 2023-05-29 | Stop reason: HOSPADM

## 2023-05-29 RX ORDER — HEPARIN 100 UNIT/ML
500 SYRINGE INTRAVENOUS
Status: CANCELLED | OUTPATIENT
Start: 2023-05-30

## 2023-05-29 RX ORDER — SODIUM CHLORIDE 0.9 % (FLUSH) 0.9 %
10 SYRINGE (ML) INJECTION
Status: CANCELLED | OUTPATIENT
Start: 2023-05-29

## 2023-05-29 RX ORDER — SODIUM CHLORIDE 0.9 % (FLUSH) 0.9 %
10 SYRINGE (ML) INJECTION
Status: CANCELLED | OUTPATIENT
Start: 2023-06-01

## 2023-05-29 RX ORDER — PROCHLORPERAZINE EDISYLATE 5 MG/ML
10 INJECTION INTRAMUSCULAR; INTRAVENOUS
Status: CANCELLED
Start: 2023-06-02

## 2023-05-29 RX ORDER — PROCHLORPERAZINE EDISYLATE 5 MG/ML
10 INJECTION INTRAMUSCULAR; INTRAVENOUS
Status: CANCELLED
Start: 2023-05-30

## 2023-05-29 RX ORDER — LIDOCAINE AND PRILOCAINE 25; 25 MG/G; MG/G
CREAM TOPICAL
Qty: 30 G | Refills: 5 | Status: SHIPPED | OUTPATIENT
Start: 2023-05-29 | End: 2023-05-30 | Stop reason: SDUPTHER

## 2023-05-29 RX ORDER — SODIUM CHLORIDE 0.9 % (FLUSH) 0.9 %
10 SYRINGE (ML) INJECTION
Status: CANCELLED | OUTPATIENT
Start: 2023-06-05

## 2023-05-29 RX ADMIN — PALONOSETRON HYDROCHLORIDE 0.25 MG: 0.25 INJECTION, SOLUTION INTRAVENOUS at 10:05

## 2023-05-29 RX ADMIN — HEPARIN 500 UNITS: 100 SYRINGE at 02:05

## 2023-05-29 RX ADMIN — SODIUM CHLORIDE 500 ML: 0.9 INJECTION, SOLUTION INTRAVENOUS at 09:05

## 2023-05-29 RX ADMIN — CISPLATIN 43 MG: 1 INJECTION, SOLUTION INTRAVENOUS at 11:05

## 2023-05-29 RX ADMIN — ETOPOSIDE 216 MG: 20 INJECTION, SOLUTION, CONCENTRATE INTRAVENOUS at 12:05

## 2023-05-29 RX ADMIN — SODIUM CHLORIDE 500 ML: 0.9 INJECTION, SOLUTION INTRAVENOUS at 01:05

## 2023-05-29 RX ADMIN — BLEOMYCIN SULFATE 30 UNITS: 30 POWDER, FOR SOLUTION INTRAMUSCULAR; INTRAPLEURAL; INTRAVENOUS; SUBCUTANEOUS at 01:05

## 2023-05-29 RX ADMIN — Medication 10 ML: at 02:05

## 2023-05-29 RX ADMIN — APREPITANT 130 MG: 130 INJECTION, EMULSION INTRAVENOUS at 11:05

## 2023-05-29 NOTE — PLAN OF CARE
1443 pt tolerated D1C1 without issue, pt to rtc for D2 etoposide 5/30/23, no distress noted upon d/c to home, did review with pt home rx again to clarify for pt

## 2023-05-29 NOTE — PLAN OF CARE
0930 pt here for D1C1 Cis/Etopo/Bleo with IVF, labs, hx, meds, allergies reviewed, pt with no new complaints at this time, reclined in chair, continue to monitor

## 2023-05-30 ENCOUNTER — INFUSION (OUTPATIENT)
Dept: INFUSION THERAPY | Facility: HOSPITAL | Age: 28
End: 2023-05-30
Payer: COMMERCIAL

## 2023-05-30 VITALS
DIASTOLIC BLOOD PRESSURE: 58 MMHG | OXYGEN SATURATION: 98 % | HEART RATE: 81 BPM | RESPIRATION RATE: 16 BRPM | TEMPERATURE: 98 F | SYSTOLIC BLOOD PRESSURE: 116 MMHG

## 2023-05-30 DIAGNOSIS — C62.11 MALIGNANT NEOPLASM OF DESCENDED RIGHT TESTIS: Primary | ICD-10-CM

## 2023-05-30 DIAGNOSIS — C62.11 MALIGNANT NEOPLASM OF DESCENDED RIGHT TESTIS: ICD-10-CM

## 2023-05-30 PROCEDURE — 96367 TX/PROPH/DG ADDL SEQ IV INF: CPT

## 2023-05-30 PROCEDURE — 96413 CHEMO IV INFUSION 1 HR: CPT

## 2023-05-30 PROCEDURE — 96375 TX/PRO/DX INJ NEW DRUG ADDON: CPT

## 2023-05-30 PROCEDURE — 96361 HYDRATE IV INFUSION ADD-ON: CPT

## 2023-05-30 PROCEDURE — 63600175 PHARM REV CODE 636 W HCPCS: Performed by: INTERNAL MEDICINE

## 2023-05-30 PROCEDURE — 96417 CHEMO IV INFUS EACH ADDL SEQ: CPT

## 2023-05-30 PROCEDURE — 25000003 PHARM REV CODE 250: Performed by: INTERNAL MEDICINE

## 2023-05-30 RX ORDER — LIDOCAINE AND PRILOCAINE 25; 25 MG/G; MG/G
CREAM TOPICAL
Qty: 30 G | Refills: 5 | Status: SHIPPED | OUTPATIENT
Start: 2023-05-30 | End: 2023-06-22 | Stop reason: SDUPTHER

## 2023-05-30 RX ORDER — HEPARIN 100 UNIT/ML
500 SYRINGE INTRAVENOUS
Status: DISCONTINUED | OUTPATIENT
Start: 2023-05-30 | End: 2023-05-30 | Stop reason: HOSPADM

## 2023-05-30 RX ORDER — ACETAMINOPHEN 325 MG/1
650 TABLET ORAL
Status: COMPLETED | OUTPATIENT
Start: 2023-05-30 | End: 2023-05-30

## 2023-05-30 RX ORDER — PROCHLORPERAZINE EDISYLATE 5 MG/ML
10 INJECTION INTRAMUSCULAR; INTRAVENOUS
Status: COMPLETED | OUTPATIENT
Start: 2023-05-30 | End: 2023-05-30

## 2023-05-30 RX ORDER — SODIUM CHLORIDE 0.9 % (FLUSH) 0.9 %
10 SYRINGE (ML) INJECTION
Status: DISCONTINUED | OUTPATIENT
Start: 2023-05-30 | End: 2023-05-30 | Stop reason: HOSPADM

## 2023-05-30 RX ADMIN — HEPARIN 500 UNITS: 100 SYRINGE at 10:05

## 2023-05-30 RX ADMIN — CISPLATIN 43 MG: 1 INJECTION, SOLUTION INTRAVENOUS at 08:05

## 2023-05-30 RX ADMIN — ACETAMINOPHEN 650 MG: 325 TABLET ORAL at 09:05

## 2023-05-30 RX ADMIN — SODIUM CHLORIDE 500 ML: 9 INJECTION, SOLUTION INTRAVENOUS at 07:05

## 2023-05-30 RX ADMIN — PROCHLORPERAZINE EDISYLATE 10 MG: 5 INJECTION INTRAMUSCULAR; INTRAVENOUS at 08:05

## 2023-05-30 RX ADMIN — SODIUM CHLORIDE 500 ML: 0.9 INJECTION, SOLUTION INTRAVENOUS at 09:05

## 2023-05-30 RX ADMIN — DEXAMETHASONE SODIUM PHOSPHATE 10 MG: 4 INJECTION, SOLUTION INTRA-ARTICULAR; INTRALESIONAL; INTRAMUSCULAR; INTRAVENOUS; SOFT TISSUE at 07:05

## 2023-05-30 RX ADMIN — ETOPOSIDE 216 MG: 20 INJECTION INTRAVENOUS at 09:05

## 2023-05-30 NOTE — PLAN OF CARE
Pt ambulatory to clinic today with mother for D2 C1 Etoposide/Cisplatin. Denies any sig complaints. + DANIEL last night. Denies at present. Port left accessed from yesterday's treatment. Good blood return. Dressing intact. Treatment began, during cisplatin infusion pt reports a HA. Similar to last night. Dr Gamaliel shearer, tylenol ordered, states probably from the Bleo. Pt aware. Tylenol given and HA improved. Tolerated treatment well. Port flushed and left in place for tomorrow's treatment. Ambulatory from clinic in [facility].

## 2023-05-31 ENCOUNTER — INFUSION (OUTPATIENT)
Dept: INFUSION THERAPY | Facility: HOSPITAL | Age: 28
End: 2023-05-31
Payer: COMMERCIAL

## 2023-05-31 VITALS
BODY MASS INDEX: 29.98 KG/M2 | DIASTOLIC BLOOD PRESSURE: 85 MMHG | TEMPERATURE: 98 F | RESPIRATION RATE: 18 BRPM | WEIGHT: 209.44 LBS | HEIGHT: 70 IN | SYSTOLIC BLOOD PRESSURE: 141 MMHG | HEART RATE: 78 BPM | OXYGEN SATURATION: 100 %

## 2023-05-31 DIAGNOSIS — C62.11 MALIGNANT NEOPLASM OF DESCENDED RIGHT TESTIS: Primary | ICD-10-CM

## 2023-05-31 PROCEDURE — 96413 CHEMO IV INFUSION 1 HR: CPT

## 2023-05-31 PROCEDURE — 63600175 PHARM REV CODE 636 W HCPCS: Performed by: INTERNAL MEDICINE

## 2023-05-31 PROCEDURE — 96375 TX/PRO/DX INJ NEW DRUG ADDON: CPT

## 2023-05-31 PROCEDURE — A4216 STERILE WATER/SALINE, 10 ML: HCPCS | Performed by: INTERNAL MEDICINE

## 2023-05-31 PROCEDURE — 96361 HYDRATE IV INFUSION ADD-ON: CPT

## 2023-05-31 PROCEDURE — 25000003 PHARM REV CODE 250: Performed by: INTERNAL MEDICINE

## 2023-05-31 PROCEDURE — 96367 TX/PROPH/DG ADDL SEQ IV INF: CPT

## 2023-05-31 PROCEDURE — 96417 CHEMO IV INFUS EACH ADDL SEQ: CPT

## 2023-05-31 RX ORDER — PROCHLORPERAZINE EDISYLATE 5 MG/ML
10 INJECTION INTRAMUSCULAR; INTRAVENOUS
Status: COMPLETED | OUTPATIENT
Start: 2023-05-31 | End: 2023-05-31

## 2023-05-31 RX ORDER — SODIUM CHLORIDE 0.9 % (FLUSH) 0.9 %
10 SYRINGE (ML) INJECTION
Status: DISCONTINUED | OUTPATIENT
Start: 2023-05-31 | End: 2023-05-31 | Stop reason: HOSPADM

## 2023-05-31 RX ORDER — HEPARIN 100 UNIT/ML
500 SYRINGE INTRAVENOUS
Status: DISCONTINUED | OUTPATIENT
Start: 2023-05-31 | End: 2023-05-31 | Stop reason: HOSPADM

## 2023-05-31 RX ADMIN — Medication 10 ML: at 10:05

## 2023-05-31 RX ADMIN — CISPLATIN 43 MG: 1 INJECTION, SOLUTION INTRAVENOUS at 08:05

## 2023-05-31 RX ADMIN — DEXAMETHASONE SODIUM PHOSPHATE 10 MG: 4 INJECTION, SOLUTION INTRA-ARTICULAR; INTRALESIONAL; INTRAMUSCULAR; INTRAVENOUS; SOFT TISSUE at 07:05

## 2023-05-31 RX ADMIN — ETOPOSIDE 216 MG: 20 INJECTION INTRAVENOUS at 09:05

## 2023-05-31 RX ADMIN — PROCHLORPERAZINE EDISYLATE 10 MG: 5 INJECTION INTRAMUSCULAR; INTRAVENOUS at 08:05

## 2023-05-31 RX ADMIN — SODIUM CHLORIDE 500 ML: 9 INJECTION, SOLUTION INTRAVENOUS at 09:05

## 2023-05-31 RX ADMIN — SODIUM CHLORIDE 500 ML: 0.9 INJECTION, SOLUTION INTRAVENOUS at 07:05

## 2023-05-31 RX ADMIN — HEPARIN 500 UNITS: 100 SYRINGE at 10:05

## 2023-05-31 NOTE — PLAN OF CARE
Pt received IVFs, Cisplatin & Etoposide today and tolerated well, without complications. Educated patient about IVFs, Cisplatin & Etoposide (indications, side effects, possible reactions, chemotherapy precautions) and verbalized understanding.  VSS. CW port positive for blood return, saline flushed, Heparin flush instilled to dwell, green capped and secured for use in tomorrow's tx. Pt DC with no distress noted, ambulated off unit, w/o event, w/ family, pleased.

## 2023-06-01 ENCOUNTER — HOSPITAL ENCOUNTER (OUTPATIENT)
Dept: RADIOLOGY | Facility: HOSPITAL | Age: 28
Discharge: HOME | End: 2023-06-01
Attending: INTERNAL MEDICINE
Payer: COMMERCIAL

## 2023-06-01 ENCOUNTER — TELEPHONE (OUTPATIENT)
Dept: PHARMACY | Facility: CLINIC | Age: 28
End: 2023-06-01
Payer: COMMERCIAL

## 2023-06-01 ENCOUNTER — INFUSION (OUTPATIENT)
Dept: INFUSION THERAPY | Facility: HOSPITAL | Age: 28
End: 2023-06-01
Payer: COMMERCIAL

## 2023-06-01 VITALS
TEMPERATURE: 98 F | SYSTOLIC BLOOD PRESSURE: 145 MMHG | HEIGHT: 70 IN | DIASTOLIC BLOOD PRESSURE: 79 MMHG | RESPIRATION RATE: 18 BRPM | HEART RATE: 65 BPM | BODY MASS INDEX: 31.06 KG/M2 | WEIGHT: 216.94 LBS | OXYGEN SATURATION: 99 %

## 2023-06-01 DIAGNOSIS — C62.11 MALIGNANT NEOPLASM OF DESCENDED RIGHT TESTIS: Primary | ICD-10-CM

## 2023-06-01 DIAGNOSIS — R19.00 INTRA-ABDOMINAL AND PELVIC SWELLING, MASS AND LUMP, UNSPECIFIED SITE: ICD-10-CM

## 2023-06-01 DIAGNOSIS — R94.8 ABNORMAL POSITRON EMISSION TOMOGRAPHY (PET) SCAN: ICD-10-CM

## 2023-06-01 DIAGNOSIS — C62.11 MALIGNANT NEOPLASM OF DESCENDED RIGHT TESTIS: ICD-10-CM

## 2023-06-01 PROCEDURE — 25500020 PHARM REV CODE 255: Performed by: INTERNAL MEDICINE

## 2023-06-01 PROCEDURE — A9585 GADOBUTROL INJECTION: HCPCS | Performed by: INTERNAL MEDICINE

## 2023-06-01 PROCEDURE — 74183 MRI ABD W/O CNTR FLWD CNTR: CPT | Mod: 26,,, | Performed by: STUDENT IN AN ORGANIZED HEALTH CARE EDUCATION/TRAINING PROGRAM

## 2023-06-01 PROCEDURE — 96367 TX/PROPH/DG ADDL SEQ IV INF: CPT

## 2023-06-01 PROCEDURE — 74183 MRI ABDOMEN W WO CONTRAST: ICD-10-PCS | Mod: 26,,, | Performed by: STUDENT IN AN ORGANIZED HEALTH CARE EDUCATION/TRAINING PROGRAM

## 2023-06-01 PROCEDURE — 96413 CHEMO IV INFUSION 1 HR: CPT

## 2023-06-01 PROCEDURE — 96417 CHEMO IV INFUS EACH ADDL SEQ: CPT

## 2023-06-01 PROCEDURE — 63600175 PHARM REV CODE 636 W HCPCS: Performed by: INTERNAL MEDICINE

## 2023-06-01 PROCEDURE — A4216 STERILE WATER/SALINE, 10 ML: HCPCS | Performed by: INTERNAL MEDICINE

## 2023-06-01 PROCEDURE — 25000003 PHARM REV CODE 250: Performed by: INTERNAL MEDICINE

## 2023-06-01 PROCEDURE — 74183 MRI ABD W/O CNTR FLWD CNTR: CPT | Mod: TC

## 2023-06-01 RX ORDER — SODIUM CHLORIDE 0.9 % (FLUSH) 0.9 %
10 SYRINGE (ML) INJECTION
Status: DISCONTINUED | OUTPATIENT
Start: 2023-06-01 | End: 2023-06-01 | Stop reason: HOSPADM

## 2023-06-01 RX ORDER — GADOBUTROL 604.72 MG/ML
10 INJECTION INTRAVENOUS
Status: COMPLETED | OUTPATIENT
Start: 2023-06-01 | End: 2023-06-01

## 2023-06-01 RX ORDER — HEPARIN 100 UNIT/ML
500 SYRINGE INTRAVENOUS
Status: DISCONTINUED | OUTPATIENT
Start: 2023-06-01 | End: 2023-06-01 | Stop reason: HOSPADM

## 2023-06-01 RX ADMIN — SODIUM CHLORIDE 500 ML: 9 INJECTION, SOLUTION INTRAVENOUS at 09:06

## 2023-06-01 RX ADMIN — CISPLATIN 43 MG: 1 INJECTION, SOLUTION INTRAVENOUS at 08:06

## 2023-06-01 RX ADMIN — PALONOSETRON HYDROCHLORIDE 0.25 MG: 0.25 INJECTION, SOLUTION INTRAVENOUS at 07:06

## 2023-06-01 RX ADMIN — Medication 10 ML: at 11:06

## 2023-06-01 RX ADMIN — ETOPOSIDE 216 MG: 20 INJECTION INTRAVENOUS at 09:06

## 2023-06-01 RX ADMIN — SODIUM CHLORIDE 500 ML: 9 INJECTION, SOLUTION INTRAVENOUS at 07:06

## 2023-06-01 RX ADMIN — GADOBUTROL 10 ML: 604.72 INJECTION INTRAVENOUS at 03:06

## 2023-06-01 RX ADMIN — HEPARIN 500 UNITS: 100 SYRINGE at 11:06

## 2023-06-01 NOTE — TELEPHONE ENCOUNTER
DOCUMENTATION ONLY  Lidocaine-Prilocaine 2.5-2.5% cream  Approval date: 5/31/2023 to 11/27/2023  Case ID# [id number]

## 2023-06-01 NOTE — PLAN OF CARE
1114 Patient tolerated C1D4 Cisplatin/ Etoposide/ IVFs without incident. Vitals stable before and after infusion. Port with brisk blood return and flushed without resistance before, during and after infusion, heparin locked and needle removed at discharge due to patient having an MRI later today. Patient educated on use of Emla cream for tomorrow's treatment. Patient aware of his next appointment date/time (tomorrow at 0700), uses [facility]. To contact provider with questions or concerns. D/C ambulatory and stable with his friend.

## 2023-06-02 ENCOUNTER — INFUSION (OUTPATIENT)
Dept: INFUSION THERAPY | Facility: HOSPITAL | Age: 28
End: 2023-06-02
Payer: COMMERCIAL

## 2023-06-02 VITALS
RESPIRATION RATE: 18 BRPM | BODY MASS INDEX: 31.06 KG/M2 | OXYGEN SATURATION: 99 % | HEIGHT: 70 IN | SYSTOLIC BLOOD PRESSURE: 152 MMHG | WEIGHT: 216.94 LBS | TEMPERATURE: 98 F | HEART RATE: 64 BPM | DIASTOLIC BLOOD PRESSURE: 89 MMHG

## 2023-06-02 DIAGNOSIS — C62.11 MALIGNANT NEOPLASM OF DESCENDED RIGHT TESTIS: Primary | ICD-10-CM

## 2023-06-02 PROCEDURE — 25000003 PHARM REV CODE 250: Performed by: INTERNAL MEDICINE

## 2023-06-02 PROCEDURE — 96361 HYDRATE IV INFUSION ADD-ON: CPT

## 2023-06-02 PROCEDURE — A4216 STERILE WATER/SALINE, 10 ML: HCPCS | Performed by: INTERNAL MEDICINE

## 2023-06-02 PROCEDURE — 96417 CHEMO IV INFUS EACH ADDL SEQ: CPT

## 2023-06-02 PROCEDURE — 96367 TX/PROPH/DG ADDL SEQ IV INF: CPT

## 2023-06-02 PROCEDURE — 96413 CHEMO IV INFUSION 1 HR: CPT

## 2023-06-02 PROCEDURE — 63600175 PHARM REV CODE 636 W HCPCS: Performed by: INTERNAL MEDICINE

## 2023-06-02 PROCEDURE — 96375 TX/PRO/DX INJ NEW DRUG ADDON: CPT

## 2023-06-02 RX ORDER — HEPARIN 100 UNIT/ML
500 SYRINGE INTRAVENOUS
Status: DISCONTINUED | OUTPATIENT
Start: 2023-06-02 | End: 2023-06-02 | Stop reason: HOSPADM

## 2023-06-02 RX ORDER — PROCHLORPERAZINE EDISYLATE 5 MG/ML
10 INJECTION INTRAMUSCULAR; INTRAVENOUS
Status: COMPLETED | OUTPATIENT
Start: 2023-06-02 | End: 2023-06-02

## 2023-06-02 RX ORDER — SODIUM CHLORIDE 0.9 % (FLUSH) 0.9 %
10 SYRINGE (ML) INJECTION
Status: DISCONTINUED | OUTPATIENT
Start: 2023-06-02 | End: 2023-06-02 | Stop reason: HOSPADM

## 2023-06-02 RX ADMIN — HEPARIN 500 UNITS: 100 SYRINGE at 11:06

## 2023-06-02 RX ADMIN — SODIUM CHLORIDE 500 ML: 0.9 INJECTION, SOLUTION INTRAVENOUS at 10:06

## 2023-06-02 RX ADMIN — ETOPOSIDE 216 MG: 20 INJECTION INTRAVENOUS at 10:06

## 2023-06-02 RX ADMIN — Medication 10 ML: at 11:06

## 2023-06-02 RX ADMIN — SODIUM CHLORIDE 500 ML: 0.9 INJECTION, SOLUTION INTRAVENOUS at 07:06

## 2023-06-02 RX ADMIN — PROCHLORPERAZINE EDISYLATE 10 MG: 5 INJECTION INTRAMUSCULAR; INTRAVENOUS at 07:06

## 2023-06-02 RX ADMIN — DEXAMETHASONE SODIUM PHOSPHATE 10 MG: 4 INJECTION, SOLUTION INTRA-ARTICULAR; INTRALESIONAL; INTRAMUSCULAR; INTRAVENOUS; SOFT TISSUE at 08:06

## 2023-06-02 RX ADMIN — CISPLATIN 43 MG: 1 INJECTION, SOLUTION INTRAVENOUS at 09:06

## 2023-06-02 NOTE — PLAN OF CARE
Pt received IVFs, Cisplatin & Etoposide today and tolerated well, without complications. Educated patient about IVFs, Cisplatin & Etoposide (indications, side effects, possible reactions, chemotherapy precautions) and verbalized understanding.  VSS. CW port positive for blood return, saline flushed, Heparin flush instilled to dwell and de accessed prior to DC. Pt DC with no distress noted, ambulated off unit, w/o event, w/ family, pleased.

## 2023-06-05 ENCOUNTER — INFUSION (OUTPATIENT)
Dept: INFUSION THERAPY | Facility: HOSPITAL | Age: 28
End: 2023-06-05
Attending: INTERNAL MEDICINE
Payer: COMMERCIAL

## 2023-06-05 ENCOUNTER — LAB VISIT (OUTPATIENT)
Dept: LAB | Facility: HOSPITAL | Age: 28
End: 2023-06-05
Attending: INTERNAL MEDICINE
Payer: COMMERCIAL

## 2023-06-05 VITALS
TEMPERATURE: 98 F | DIASTOLIC BLOOD PRESSURE: 84 MMHG | RESPIRATION RATE: 16 BRPM | BODY MASS INDEX: 29.03 KG/M2 | HEART RATE: 68 BPM | SYSTOLIC BLOOD PRESSURE: 137 MMHG | HEIGHT: 70 IN | WEIGHT: 202.81 LBS

## 2023-06-05 DIAGNOSIS — C62.11 MALIGNANT NEOPLASM OF DESCENDED RIGHT TESTIS: ICD-10-CM

## 2023-06-05 DIAGNOSIS — C62.11 MALIGNANT NEOPLASM OF DESCENDED RIGHT TESTIS: Primary | ICD-10-CM

## 2023-06-05 LAB
ALBUMIN SERPL BCP-MCNC: 4 G/DL (ref 3.5–5.2)
ALP SERPL-CCNC: 61 U/L (ref 55–135)
ALT SERPL W/O P-5'-P-CCNC: 16 U/L (ref 10–44)
ANION GAP SERPL CALC-SCNC: 10 MMOL/L (ref 8–16)
AST SERPL-CCNC: 14 U/L (ref 10–40)
BASOPHILS # BLD AUTO: 0.01 K/UL (ref 0–0.2)
BASOPHILS NFR BLD: 0.1 % (ref 0–1.9)
BILIRUB SERPL-MCNC: 1.2 MG/DL (ref 0.1–1)
BUN SERPL-MCNC: 23 MG/DL (ref 6–20)
CALCIUM SERPL-MCNC: 9.2 MG/DL (ref 8.7–10.5)
CHLORIDE SERPL-SCNC: 96 MMOL/L (ref 95–110)
CO2 SERPL-SCNC: 29 MMOL/L (ref 23–29)
CREAT SERPL-MCNC: 1.2 MG/DL (ref 0.5–1.4)
DIFFERENTIAL METHOD: ABNORMAL
EOSINOPHIL # BLD AUTO: 0.1 K/UL (ref 0–0.5)
EOSINOPHIL NFR BLD: 1.4 % (ref 0–8)
ERYTHROCYTE [DISTWIDTH] IN BLOOD BY AUTOMATED COUNT: 15.1 % (ref 11.5–14.5)
EST. GFR  (NO RACE VARIABLE): >60 ML/MIN/1.73 M^2
GLUCOSE SERPL-MCNC: 93 MG/DL (ref 70–110)
HCT VFR BLD AUTO: 48.3 % (ref 40–54)
HGB BLD-MCNC: 15.6 G/DL (ref 14–18)
IMM GRANULOCYTES # BLD AUTO: 0.07 K/UL (ref 0–0.04)
IMM GRANULOCYTES NFR BLD AUTO: 0.8 % (ref 0–0.5)
LYMPHOCYTES # BLD AUTO: 3 K/UL (ref 1–4.8)
LYMPHOCYTES NFR BLD: 34.8 % (ref 18–48)
MCH RBC QN AUTO: 26.8 PG (ref 27–31)
MCHC RBC AUTO-ENTMCNC: 32.3 G/DL (ref 32–36)
MCV RBC AUTO: 83 FL (ref 82–98)
MONOCYTES # BLD AUTO: 0 K/UL (ref 0.3–1)
MONOCYTES NFR BLD: 0.5 % (ref 4–15)
NEUTROPHILS # BLD AUTO: 5.4 K/UL (ref 1.8–7.7)
NEUTROPHILS NFR BLD: 62.4 % (ref 38–73)
NRBC BLD-RTO: 0 /100 WBC
PLATELET # BLD AUTO: 352 K/UL (ref 150–450)
PMV BLD AUTO: 10.5 FL (ref 9.2–12.9)
POTASSIUM SERPL-SCNC: 3.6 MMOL/L (ref 3.5–5.1)
PROT SERPL-MCNC: 7.3 G/DL (ref 6–8.4)
RBC # BLD AUTO: 5.82 M/UL (ref 4.6–6.2)
SODIUM SERPL-SCNC: 135 MMOL/L (ref 136–145)
WBC # BLD AUTO: 8.6 K/UL (ref 3.9–12.7)

## 2023-06-05 PROCEDURE — 63600175 PHARM REV CODE 636 W HCPCS: Performed by: REGISTERED NURSE

## 2023-06-05 PROCEDURE — 25000003 PHARM REV CODE 250: Performed by: INTERNAL MEDICINE

## 2023-06-05 PROCEDURE — 63600175 PHARM REV CODE 636 W HCPCS: Performed by: INTERNAL MEDICINE

## 2023-06-05 PROCEDURE — 96367 TX/PROPH/DG ADDL SEQ IV INF: CPT

## 2023-06-05 PROCEDURE — 36415 COLL VENOUS BLD VENIPUNCTURE: CPT | Performed by: INTERNAL MEDICINE

## 2023-06-05 PROCEDURE — 80053 COMPREHEN METABOLIC PANEL: CPT | Performed by: INTERNAL MEDICINE

## 2023-06-05 PROCEDURE — 96361 HYDRATE IV INFUSION ADD-ON: CPT

## 2023-06-05 PROCEDURE — 96413 CHEMO IV INFUSION 1 HR: CPT

## 2023-06-05 PROCEDURE — 25000003 PHARM REV CODE 250: Performed by: REGISTERED NURSE

## 2023-06-05 PROCEDURE — 85025 COMPLETE CBC W/AUTO DIFF WBC: CPT | Performed by: INTERNAL MEDICINE

## 2023-06-05 RX ORDER — SODIUM CHLORIDE 0.9 % (FLUSH) 0.9 %
10 SYRINGE (ML) INJECTION
Status: DISCONTINUED | OUTPATIENT
Start: 2023-06-05 | End: 2023-06-05 | Stop reason: HOSPADM

## 2023-06-05 RX ORDER — SODIUM CHLORIDE 9 MG/ML
INJECTION, SOLUTION INTRAVENOUS CONTINUOUS
Status: DISCONTINUED | OUTPATIENT
Start: 2023-06-05 | End: 2023-06-05 | Stop reason: HOSPADM

## 2023-06-05 RX ORDER — HEPARIN 100 UNIT/ML
500 SYRINGE INTRAVENOUS
Status: DISCONTINUED | OUTPATIENT
Start: 2023-06-05 | End: 2023-06-05 | Stop reason: HOSPADM

## 2023-06-05 RX ADMIN — SODIUM CHLORIDE: 9 INJECTION, SOLUTION INTRAVENOUS at 09:06

## 2023-06-05 RX ADMIN — BLEOMYCIN SULFATE 30 UNITS: 30 POWDER, FOR SOLUTION INTRAMUSCULAR; INTRAPLEURAL; INTRAVENOUS; SUBCUTANEOUS at 11:06

## 2023-06-05 RX ADMIN — PROMETHAZINE HYDROCHLORIDE 25 MG: 25 INJECTION INTRAMUSCULAR; INTRAVENOUS at 11:06

## 2023-06-05 RX ADMIN — SODIUM CHLORIDE: 9 INJECTION, SOLUTION INTRAVENOUS at 10:06

## 2023-06-05 RX ADMIN — HEPARIN 500 UNITS: 100 SYRINGE at 12:06

## 2023-06-05 NOTE — PLAN OF CARE
Arrived today for C1d8 Bleomycin. States he has had a rough couple of days. Lots of nausea despite taking zofran and occasional phenergan. He lost 14 lbs since last week. He states he has been drinking but could not eat. Vitals are good, 130/81, 89,17.  A liter of ns while we wait for labs. Dr. Alston notified.    1230) Tolerated iv fluids and Bleomycin well. Patient on waitlist for iv fluids this Thursday. Instructed to message Gamaliel's clinic if nausea worsens and to continue taking zofran, dexamethasone,phenergan, and hydrating as needed. Verbalized understanding. VSS at discharge.

## 2023-06-07 ENCOUNTER — PATIENT MESSAGE (OUTPATIENT)
Dept: HEMATOLOGY/ONCOLOGY | Facility: CLINIC | Age: 28
End: 2023-06-07
Payer: COMMERCIAL

## 2023-06-07 DIAGNOSIS — R11.2 NAUSEA AND VOMITING, UNSPECIFIED VOMITING TYPE: Primary | ICD-10-CM

## 2023-06-07 RX ORDER — ALPRAZOLAM 0.25 MG/1
0.25 TABLET ORAL 3 TIMES DAILY PRN
Qty: 30 TABLET | Refills: 0 | Status: SHIPPED | OUTPATIENT
Start: 2023-06-07 | End: 2024-06-06

## 2023-06-08 ENCOUNTER — INFUSION (OUTPATIENT)
Dept: INFUSION THERAPY | Facility: HOSPITAL | Age: 28
End: 2023-06-08
Payer: COMMERCIAL

## 2023-06-08 VITALS
TEMPERATURE: 98 F | SYSTOLIC BLOOD PRESSURE: 152 MMHG | HEART RATE: 84 BPM | RESPIRATION RATE: 16 BRPM | DIASTOLIC BLOOD PRESSURE: 83 MMHG

## 2023-06-08 DIAGNOSIS — C62.11 MALIGNANT NEOPLASM OF DESCENDED RIGHT TESTIS: Primary | ICD-10-CM

## 2023-06-08 PROCEDURE — 63600175 PHARM REV CODE 636 W HCPCS: Performed by: INTERNAL MEDICINE

## 2023-06-08 PROCEDURE — 96374 THER/PROPH/DIAG INJ IV PUSH: CPT

## 2023-06-08 PROCEDURE — 96361 HYDRATE IV INFUSION ADD-ON: CPT

## 2023-06-08 PROCEDURE — 25000003 PHARM REV CODE 250: Performed by: REGISTERED NURSE

## 2023-06-08 RX ORDER — SODIUM CHLORIDE 9 MG/ML
INJECTION, SOLUTION INTRAVENOUS ONCE
Status: COMPLETED | OUTPATIENT
Start: 2023-06-08 | End: 2023-06-08

## 2023-06-08 RX ORDER — PROCHLORPERAZINE EDISYLATE 5 MG/ML
10 INJECTION INTRAMUSCULAR; INTRAVENOUS
Status: COMPLETED | OUTPATIENT
Start: 2023-06-08 | End: 2023-06-08

## 2023-06-08 RX ORDER — HEPARIN 100 UNIT/ML
500 SYRINGE INTRAVENOUS
Status: COMPLETED | OUTPATIENT
Start: 2023-06-08 | End: 2023-06-08

## 2023-06-08 RX ADMIN — PROCHLORPERAZINE EDISYLATE 10 MG: 5 INJECTION INTRAMUSCULAR; INTRAVENOUS at 03:06

## 2023-06-08 RX ADMIN — HEPARIN 500 UNITS: 100 SYRINGE at 04:06

## 2023-06-08 RX ADMIN — SODIUM CHLORIDE: 9 INJECTION, SOLUTION INTRAVENOUS at 03:06

## 2023-06-13 ENCOUNTER — OFFICE VISIT (OUTPATIENT)
Dept: HEMATOLOGY/ONCOLOGY | Facility: CLINIC | Age: 28
End: 2023-06-13
Payer: COMMERCIAL

## 2023-06-13 ENCOUNTER — DOCUMENTATION ONLY (OUTPATIENT)
Dept: HEMATOLOGY/ONCOLOGY | Facility: CLINIC | Age: 28
End: 2023-06-13

## 2023-06-13 ENCOUNTER — INFUSION (OUTPATIENT)
Dept: INFUSION THERAPY | Facility: HOSPITAL | Age: 28
End: 2023-06-13
Payer: COMMERCIAL

## 2023-06-13 VITALS
RESPIRATION RATE: 18 BRPM | HEIGHT: 70 IN | DIASTOLIC BLOOD PRESSURE: 84 MMHG | WEIGHT: 206.44 LBS | HEART RATE: 69 BPM | OXYGEN SATURATION: 97 % | TEMPERATURE: 99 F | SYSTOLIC BLOOD PRESSURE: 129 MMHG | BODY MASS INDEX: 29.55 KG/M2

## 2023-06-13 VITALS
RESPIRATION RATE: 18 BRPM | TEMPERATURE: 99 F | OXYGEN SATURATION: 100 % | DIASTOLIC BLOOD PRESSURE: 82 MMHG | HEART RATE: 67 BPM | SYSTOLIC BLOOD PRESSURE: 139 MMHG

## 2023-06-13 DIAGNOSIS — C62.11 MALIGNANT NEOPLASM OF DESCENDED RIGHT TESTIS: Primary | ICD-10-CM

## 2023-06-13 DIAGNOSIS — T45.1X5A CHEMOTHERAPY INDUCED NEUTROPENIA: ICD-10-CM

## 2023-06-13 DIAGNOSIS — R11.2 NAUSEA AND VOMITING, UNSPECIFIED VOMITING TYPE: ICD-10-CM

## 2023-06-13 DIAGNOSIS — L73.9 FOLLICULITIS: ICD-10-CM

## 2023-06-13 DIAGNOSIS — D70.1 CHEMOTHERAPY INDUCED NEUTROPENIA: ICD-10-CM

## 2023-06-13 PROCEDURE — 1160F PR REVIEW ALL MEDS BY PRESCRIBER/CLIN PHARMACIST DOCUMENTED: ICD-10-PCS | Mod: CPTII,S$GLB,, | Performed by: INTERNAL MEDICINE

## 2023-06-13 PROCEDURE — 99215 OFFICE O/P EST HI 40 MIN: CPT | Mod: S$GLB,,, | Performed by: INTERNAL MEDICINE

## 2023-06-13 PROCEDURE — 3079F DIAST BP 80-89 MM HG: CPT | Mod: CPTII,S$GLB,, | Performed by: INTERNAL MEDICINE

## 2023-06-13 PROCEDURE — 1159F PR MEDICATION LIST DOCUMENTED IN MEDICAL RECORD: ICD-10-PCS | Mod: CPTII,S$GLB,, | Performed by: INTERNAL MEDICINE

## 2023-06-13 PROCEDURE — 99999 PR PBB SHADOW E&M-EST. PATIENT-LVL IV: ICD-10-PCS | Mod: PBBFAC,,, | Performed by: INTERNAL MEDICINE

## 2023-06-13 PROCEDURE — 3074F PR MOST RECENT SYSTOLIC BLOOD PRESSURE < 130 MM HG: ICD-10-PCS | Mod: CPTII,S$GLB,, | Performed by: INTERNAL MEDICINE

## 2023-06-13 PROCEDURE — 3008F PR BODY MASS INDEX (BMI) DOCUMENTED: ICD-10-PCS | Mod: CPTII,S$GLB,, | Performed by: INTERNAL MEDICINE

## 2023-06-13 PROCEDURE — 99215 PR OFFICE/OUTPT VISIT, EST, LEVL V, 40-54 MIN: ICD-10-PCS | Mod: S$GLB,,, | Performed by: INTERNAL MEDICINE

## 2023-06-13 PROCEDURE — 1160F RVW MEDS BY RX/DR IN RCRD: CPT | Mod: CPTII,S$GLB,, | Performed by: INTERNAL MEDICINE

## 2023-06-13 PROCEDURE — 63600175 PHARM REV CODE 636 W HCPCS: Performed by: INTERNAL MEDICINE

## 2023-06-13 PROCEDURE — 3074F SYST BP LT 130 MM HG: CPT | Mod: CPTII,S$GLB,, | Performed by: INTERNAL MEDICINE

## 2023-06-13 PROCEDURE — 3044F HG A1C LEVEL LT 7.0%: CPT | Mod: CPTII,S$GLB,, | Performed by: INTERNAL MEDICINE

## 2023-06-13 PROCEDURE — 96413 CHEMO IV INFUSION 1 HR: CPT

## 2023-06-13 PROCEDURE — 1159F MED LIST DOCD IN RCRD: CPT | Mod: CPTII,S$GLB,, | Performed by: INTERNAL MEDICINE

## 2023-06-13 PROCEDURE — 25000003 PHARM REV CODE 250: Performed by: INTERNAL MEDICINE

## 2023-06-13 PROCEDURE — 3044F PR MOST RECENT HEMOGLOBIN A1C LEVEL <7.0%: ICD-10-PCS | Mod: CPTII,S$GLB,, | Performed by: INTERNAL MEDICINE

## 2023-06-13 PROCEDURE — 3008F BODY MASS INDEX DOCD: CPT | Mod: CPTII,S$GLB,, | Performed by: INTERNAL MEDICINE

## 2023-06-13 PROCEDURE — 99999 PR PBB SHADOW E&M-EST. PATIENT-LVL IV: CPT | Mod: PBBFAC,,, | Performed by: INTERNAL MEDICINE

## 2023-06-13 PROCEDURE — 3079F PR MOST RECENT DIASTOLIC BLOOD PRESSURE 80-89 MM HG: ICD-10-PCS | Mod: CPTII,S$GLB,, | Performed by: INTERNAL MEDICINE

## 2023-06-13 RX ORDER — SODIUM CHLORIDE 0.9 % (FLUSH) 0.9 %
10 SYRINGE (ML) INJECTION
Status: DISCONTINUED | OUTPATIENT
Start: 2023-06-13 | End: 2023-06-13 | Stop reason: HOSPADM

## 2023-06-13 RX ORDER — MUPIROCIN 20 MG/G
OINTMENT TOPICAL 3 TIMES DAILY
Qty: 22 G | Refills: 0 | Status: ON HOLD | OUTPATIENT
Start: 2023-06-13 | End: 2023-07-20

## 2023-06-13 RX ORDER — DOXYCYCLINE 100 MG/1
100 CAPSULE ORAL 2 TIMES DAILY
Qty: 14 CAPSULE | Refills: 0 | Status: ON HOLD | OUTPATIENT
Start: 2023-06-13 | End: 2023-07-20

## 2023-06-13 RX ORDER — ONDANSETRON HYDROCHLORIDE 8 MG/1
8 TABLET, FILM COATED ORAL EVERY 12 HOURS PRN
Qty: 30 TABLET | Refills: 2 | Status: SHIPPED | OUTPATIENT
Start: 2023-06-13 | End: 2024-06-12

## 2023-06-13 RX ORDER — HEPARIN 100 UNIT/ML
500 SYRINGE INTRAVENOUS
Status: DISCONTINUED | OUTPATIENT
Start: 2023-06-13 | End: 2023-06-13 | Stop reason: HOSPADM

## 2023-06-13 RX ADMIN — HEPARIN 500 UNITS: 100 SYRINGE at 04:06

## 2023-06-13 RX ADMIN — SODIUM CHLORIDE: 9 INJECTION, SOLUTION INTRAVENOUS at 02:06

## 2023-06-13 RX ADMIN — BLEOMYCIN SULFATE 30 UNITS: 30 POWDER, FOR SOLUTION INTRAMUSCULAR; INTRAPLEURAL; INTRAVENOUS; SUBCUTANEOUS at 03:06

## 2023-06-13 NOTE — PLAN OF CARE
Pt ambulatory to clinic with sig other for Bleomycin infusion. Denies any complaints. States feeling a 100% better since the weekend. Port accessed without difficulty. Good blood return. Pt tolerated infusion well. Port deaccessed after flushing. Ambulatory from clinic in [facility].

## 2023-06-13 NOTE — PROGRESS NOTES
Subjective     Patient ID: Arturo Salguero III is a 27 y.o. male.    Chief Complaint: Malignant neoplasm of descended right testis    HPI    Returns for follow up and C1D15 Bleomycin from BEP regimen  1st week did well until Thursday or Friday- noted nausea  Tried multiple medications including Xanax, liquid CBD oil which helped bring appetite up  Lost approx 15 lbs- regaining weight now  Energy down with above  Denies pain    Reports he has his MRI in the interval to follow up on PET scan findings  1 still pending    - 6/1/2023 MRI Abdomen:  FINDINGS:  Inferior Thorax: Bilateral gynecomastia.  Liver: Normal size and background parenchymal signal.  No suspicious lesion.  Hepatic and portal veins are patent.  Gallbladder: Unremarkable.  Bile Ducts: No dilatation.  Pancreas: No mass or ductal dilatation.  Spleen: Unremarkable.  Adrenals: Unremarkable.  Kidneys/Ureters: No mass or hydroureteronephrosis.  GI Tract/Mesentery: No evidence of bowel obstruction or inflammation.  Peritoneal Space: No ascites.  Retroperitoneum: Few prominent distal retroperitoneal lymph nodes, similar to prior PET-CT.  Distal aortocaval lymph node measures 1.3 cm (series [id number], image 75).  Abdominal wall: Unremarkable.  Vasculature: No aneurysm.  Bones: No suspicious marrow replacing lesion.  Impression:  1. No suspicious hepatic lesion.  2. Few prominent distal retroperitoneal lymph nodes, similar to prior PET-CT.  3. Additional findings as above.    - 4/24/2023 Labs:  BHCG= 177  LDH= 194  AFP= 46     Oncology History:  - noted right testicular swelling and went to his PCP     - 3/20/2023 Testicular Ultrasound:  FINDINGS:  Right Testicle:  *Size: 7.5 x 5.3 x 5.8 cm  *Appearance: Markedly heterogeneous echotexture.  *Flow: Normal arterial and venous flow  *Epididymis: Normal.  *Hydrocele: None.  *Varicocele: None.     Left Testicle:  *Size: 7.7 x 5.2 x 5.6 cm  *Appearance: Markedly heterogeneous echotexture.  *Flow: Normal arterial and venous  flow  *Epididymis: Normal.  *Hydrocele: None.  *Varicocele: None.     Other findings: None.  Impression:  Bilateral testicular enlargement with markedly heterogeneous echotexture, concerning for infiltrative process such as lymphoma.  Correlation with prior history is recommended as sequela from bilateral remote orchitis may give a similar appearance.  Consultation with urology is recommended.  This report was flagged in Epic as abnormal.     - 3/21/2023 CT C/A/P:  FINDINGS:  BASE OF NECK: No significant abnormality.  Thyroid gland unremarkable.  THORACIC SOFT TISSUES: No axillary adenopathy.  AORTA: Thoracic aorta maintains normal caliber.  Left-sided aortic arch with normal three vessel branching pattern.  No calcific atherosclerosis of the thoracic aorta.  HEART: Normal size with physiologic pericardial fluid.  PULMONARY VASCULATURE: Unremarkable.  EMILY/MEDIASTINUM: No pathologic alexandra enlargement.  AIRWAYS: Trachea and proximal airways remain patent.  LUNGS/PLEURA: Lungs are symmetrically well expanded.  No focal consolidation, mass, pneumothorax, or pleural fluid.  LIVER: Normal in size and contour.  No focal hepatic lesion.  GALLBLADDER: No calcified gallstones.  BILE DUCTS: No evidence of dilated ducts.  PANCREAS: No mass or peripancreatic fat stranding.  SPLEEN: No splenomegaly.  ADRENALS: Unremarkable.  KIDNEYS/URETERS: Normal in size and location with normal enhancement.  No hydronephrosis or nephrolithiasis. No ureteral dilatation.  BLADDER: No evidence of wall thickening.  REPRODUCTIVE ORGANS: Heterogeneous 6.5 x 6.2 cm mass-like enlargement of right testicle, correlate with 03/20/2023 ultrasound.  STOMACH/DUODENUM: Unremarkable.  BOWEL/MESENTERY: Small bowel is normal in caliber with no evidence of obstruction. No evidence of inflammation or wall thickening.  Colon demonstrates no focal wall thickening.  Appendix is visualized and appears normal.  PERITONEUM: No intraperitoneal free air or fluid.  LYMPH  NODES: Enlarged 1.1 cm pericaval node (axial 3:99).  No pelvic or inguinal lymphadenopathy.  VASCULATURE: No significant calcific atherosclerosis.  Portal venous system is patent.  ABDOMINAL WALL:  Small fat containing umbilical hernia.  BONES: No acute fracture. No suspicious osseous lesions.  Impression:  1. Enlarged 1.1 cm pericaval lymph node.  No additional enlarged lymph nodes throughout the abdomen.  No pelvic or inguinal lymphadenopathy.  2. Heterogeneous 6.5 x 6.2 cm mass-like enlargement of right testicle, correlate with 03/20/2023 ultrasound.  3. Other findings above.  This report was flagged in Epic as abnormal.     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   - See CAP synoptic report below.   CASE SUMMARY: (TESTIS: Radical Orchiectomy)   Standard(s): AJCC-UICC 8   SPECIMEN   Specimen Laterality: Right.   Tumor Focality: Unifocal.   Tumor Size: Greatest dimension of main tumor mass in Centimeters (cm): 6.7 cm.   Histologic Type: Mixed germ cell tumor.   Embryonal carcinoma (specify percentage): 50 %   Yolk sac tumor, postpubertal type (specify percentage): 40 %   Choriocarcinoma (specify percentage): 10 %   Tumor Extent: Invades rete testis.   Lymphovascular Invasion: Present.   Margin Status: All margins negative for tumor.   Regional Lymph Node Status: Not applicable (no regional lymph nodes submitted or found).   Distant Site: Not applicable.   PATHOLOGIC STAGE CLASSIFICATION (pTNM, AJCC 8th Edition): pT2 pN not assigned (no nodes submitted or found).   Blocks for potential molecular or ancillary studies:   Tumor block: 1I or 1L.      PMH:  None     FH:  Mother living at age 55 yo, healthy overall  Father, living at age 55 yo, healthy  1 brother- Type 1 DM- 23 yo  1 sister- healthy  No major health issues known extended family     SH:  Apartment work, maintenance A/C  Good support system  No EtOH (Rare social)  No  tobacco     Review of Systems  No changes    Review of Systems   Constitutional:  Positive for unexpected weight change. Negative for activity change, appetite change, chills, fatigue and fever.   HENT:  Negative for dental problem, hearing loss, mouth sores, postnasal drip, rhinorrhea, sinus pressure/congestion, sore throat, tinnitus, trouble swallowing and voice change.    Eyes:  Negative for visual disturbance.   Respiratory:  Negative for cough, shortness of breath and wheezing.    Cardiovascular:  Negative for chest pain, palpitations and leg swelling.   Gastrointestinal:  Positive for nausea. Negative for abdominal distention, abdominal pain, blood in stool, constipation, diarrhea and vomiting.   Endocrine: Negative for cold intolerance and heat intolerance.   Genitourinary:  Negative for decreased urine volume, difficulty urinating, dysuria, frequency, hematuria and urgency.   Musculoskeletal:  Negative for arthralgias, back pain, gait problem, joint swelling, myalgias, neck pain and neck stiffness.   Integumentary:  Positive for rash (skin pimples folliculitis). Negative for color change, pallor and wound.   Neurological:  Negative for dizziness, weakness, light-headedness, numbness and headaches.   Hematological:  Negative for adenopathy. Does not bruise/bleed easily.   Psychiatric/Behavioral:  Negative for decreased concentration, dysphoric mood and sleep disturbance. The patient is not nervous/anxious.         Objective     Physical Exam  Vitals and nursing note reviewed.   Constitutional:       General: He is not in acute distress.     Appearance: Normal appearance. He is well-developed and normal weight. He is not ill-appearing.   HENT:      Head: Normocephalic and atraumatic.   Eyes:      General: No scleral icterus.     Extraocular Movements: Extraocular movements intact.      Conjunctiva/sclera: Conjunctivae normal.      Pupils: Pupils are equal, round, and reactive to light.   Neck:      Thyroid: No  thyromegaly.      Trachea: No tracheal deviation.   Cardiovascular:      Rate and Rhythm: Normal rate and regular rhythm.      Heart sounds: Normal heart sounds. No murmur heard.    No friction rub. No gallop.   Pulmonary:      Effort: Pulmonary effort is normal. No respiratory distress.      Breath sounds: Normal breath sounds. No wheezing, rhonchi or rales.   Chest:      Chest wall: No tenderness.   Abdominal:      General: Bowel sounds are normal. There is no distension.      Palpations: Abdomen is soft. There is no mass.      Tenderness: There is no abdominal tenderness. There is no guarding or rebound.   Musculoskeletal:         General: No swelling or tenderness. Normal range of motion.      Cervical back: Normal range of motion and neck supple.      Right lower leg: No edema.      Left lower leg: No edema.   Lymphadenopathy:      Cervical: No cervical adenopathy.   Skin:     General: Skin is warm and dry.      Coloration: Skin is not jaundiced or pale.      Findings: No erythema, lesion or rash.   Neurological:      General: No focal deficit present.      Mental Status: He is alert and oriented to person, place, and time. Mental status is at baseline.      Sensory: No sensory deficit.      Motor: No weakness or abnormal muscle tone.      Coordination: Coordination normal.      Gait: Gait normal.      Deep Tendon Reflexes: Reflexes are normal and symmetric. Reflexes normal.   Psychiatric:         Mood and Affect: Mood normal.         Behavior: Behavior normal.         Thought Content: Thought content normal.         Judgment: Judgment normal.     Labs- reviewed     Assessment and Plan     1. Malignant neoplasm of descended right testis  -     CBC W/ AUTO DIFFERENTIAL; Future; Expected date: 06/13/2023  -     Comprehensive Metabolic Panel; Future  -     BHCG Quant, Tumor Marker; Future; Expected date: 06/13/2023  -     LACTATE DEHYDROGENASE; Future; Expected date: 06/13/2023  -     AFP TUMOR MARKER; Future;  Expected date: 06/13/2023    2. Nausea and vomiting, unspecified vomiting type  -     ondansetron (ZOFRAN) 8 MG tablet; Take 1 tablet (8 mg total) by mouth every 12 (twelve) hours as needed for Nausea.  Dispense: 30 tablet; Refill: 2    3. Chemotherapy induced neutropenia    4. Folliculitis  -     mupirocin (BACTROBAN) 2 % ointment; by Nasal route 3 (three) times daily.  Dispense: 15 g; Refill: 0  -     doxycycline (VIBRAMYCIN) 100 MG Cap; Take 1 capsule (100 mg total) by mouth 2 (two) times daily.  Dispense: 14 capsule; Refill: 0    Complete C1 of BEP; needs 3 cycles total  Ok to proceed with Bleomycin  Still needs to complete MRI spine (liver finding confirmed benign on MRI Abdomen)    Reviewed neutropenic precautions    N/V - reviewed medications and wrote out plan  He knows to call for issues    35 minutes direct with patient  5 minutes care coordination    Route Chart for Scheduling    Med Onc Chart Routing  Urgent    Follow up with physician . He does not need labs or EP on 6/15!!   Follow up with GREGORY . Needs to see PJ or Quiana on 6/16 with cbc, cmp and tumor markers and keep chemo for 6/19   Infusion scheduling note    Injection scheduling note    Labs    Imaging    Pharmacy appointment    Other referrals          Treatment Plan Information   OP TESTICULAR BEP 5-DAY Q3W   Zenobia Alston MD   Upcoming Treatment Dates - OP TESTICULAR BEP 5-DAY Q3W    6/19/2023       Pre-Medications       aprepitant (CINVANTI) injection 130 mg       palonosetron 0.25mg/dexAMETHasone 10mg in NS IVPB 0.25 mg 50 mL       Chemotherapy       CISplatin (Platinol) 20 mg/m2 = 43 mg in sodium chloride 0.9% 293 mL chemo infusion       etoposide (VEPESID) 100 mg/m2 = 216 mg in sodium chloride 0.9% 510.8 mL chemo infusion       bleomycin (BLEOCIN) 30 Units in sodium chloride 0.9% 110 mL chemo infusion       Pre-Hydration       sodium chloride 0.9% bolus 500 mL 500 mL       Post-Hydration       sodium chloride 0.9% bolus 500 mL 500  mL  6/20/2023       Pre-Medications       dexAMETHasone (DECADRON) 10 mg in sodium chloride 0.9% 50 mL IVPB       prochlorperazine injection Soln 10 mg       Chemotherapy       CISplatin (Platinol) 20 mg/m2 = 43 mg in sodium chloride 0.9% 543 mL chemo infusion       etoposide (VEPESID) 100 mg/m2 = 216 mg in sodium chloride 0.9% 510.8 mL chemo infusion       Pre-Hydration       sodium chloride 0.9% bolus 500 mL 500 mL       Post-Hydration       sodium chloride 0.9% bolus 500 mL 500 mL  6/21/2023       Pre-Medications       dexAMETHasone (DECADRON) 10 mg in sodium chloride 0.9% 50 mL IVPB       prochlorperazine injection Soln 10 mg       Chemotherapy       CISplatin (Platinol) 20 mg/m2 = 43 mg in sodium chloride 0.9% 543 mL chemo infusion       etoposide (VEPESID) 100 mg/m2 = 216 mg in sodium chloride 0.9% 510.8 mL chemo infusion       Pre-Hydration       sodium chloride 0.9% bolus 500 mL 500 mL       Post-Hydration       sodium chloride 0.9% bolus 500 mL 500 mL  6/22/2023       Pre-Medications       palonosetron 0.25mg/dexAMETHasone 10mg in NS IVPB 0.25 mg 50 mL       Chemotherapy       CISplatin (Platinol) 20 mg/m2 = 43 mg in sodium chloride 0.9% 543 mL chemo infusion       etoposide (VEPESID) 100 mg/m2 = 216 mg in sodium chloride 0.9% 510.8 mL chemo infusion       Pre-Hydration       sodium chloride 0.9% bolus 500 mL 500 mL       Post-Hydration       sodium chloride 0.9% bolus 500 mL 500 mL

## 2023-06-14 ENCOUNTER — HOSPITAL ENCOUNTER (OUTPATIENT)
Dept: RADIOLOGY | Facility: HOSPITAL | Age: 28
Discharge: HOME | End: 2023-06-14
Attending: INTERNAL MEDICINE
Payer: COMMERCIAL

## 2023-06-14 PROCEDURE — 72157 MRI CHEST SPINE W/O & W/DYE: CPT | Mod: 26,,, | Performed by: RADIOLOGY

## 2023-06-14 PROCEDURE — 25500020 PHARM REV CODE 255: Performed by: INTERNAL MEDICINE

## 2023-06-14 PROCEDURE — 72157 MRI THORACIC SPINE W WO CONTRAST: ICD-10-PCS | Mod: 26,,, | Performed by: RADIOLOGY

## 2023-06-14 PROCEDURE — A9585 GADOBUTROL INJECTION: HCPCS | Performed by: INTERNAL MEDICINE

## 2023-06-14 PROCEDURE — 72157 MRI CHEST SPINE W/O & W/DYE: CPT | Mod: TC

## 2023-06-14 RX ORDER — GADOBUTROL 604.72 MG/ML
10 INJECTION INTRAVENOUS
Status: COMPLETED | OUTPATIENT
Start: 2023-06-14 | End: 2023-06-14

## 2023-06-14 RX ADMIN — GADOBUTROL 10 ML: 604.72 INJECTION INTRAVENOUS at 03:06

## 2023-06-19 ENCOUNTER — INFUSION (OUTPATIENT)
Dept: INFUSION THERAPY | Facility: HOSPITAL | Age: 28
End: 2023-06-19
Payer: COMMERCIAL

## 2023-06-19 ENCOUNTER — OFFICE VISIT (OUTPATIENT)
Dept: HEMATOLOGY/ONCOLOGY | Facility: CLINIC | Age: 28
End: 2023-06-19
Payer: COMMERCIAL

## 2023-06-19 VITALS
OXYGEN SATURATION: 98 % | SYSTOLIC BLOOD PRESSURE: 129 MMHG | WEIGHT: 207.31 LBS | HEART RATE: 78 BPM | TEMPERATURE: 98 F | HEIGHT: 70 IN | DIASTOLIC BLOOD PRESSURE: 79 MMHG | BODY MASS INDEX: 29.68 KG/M2 | RESPIRATION RATE: 18 BRPM

## 2023-06-19 VITALS
HEART RATE: 62 BPM | OXYGEN SATURATION: 99 % | TEMPERATURE: 98 F | SYSTOLIC BLOOD PRESSURE: 123 MMHG | DIASTOLIC BLOOD PRESSURE: 80 MMHG | RESPIRATION RATE: 18 BRPM

## 2023-06-19 DIAGNOSIS — C62.11 MALIGNANT NEOPLASM OF DESCENDED RIGHT TESTIS: Primary | ICD-10-CM

## 2023-06-19 DIAGNOSIS — R11.2 NAUSEA AND VOMITING, UNSPECIFIED VOMITING TYPE: ICD-10-CM

## 2023-06-19 DIAGNOSIS — T45.1X5A ANEMIA DUE TO CHEMOTHERAPY: ICD-10-CM

## 2023-06-19 DIAGNOSIS — R11.2 CHEMOTHERAPY INDUCED NAUSEA AND VOMITING: ICD-10-CM

## 2023-06-19 DIAGNOSIS — T45.1X5A CHEMOTHERAPY INDUCED NAUSEA AND VOMITING: ICD-10-CM

## 2023-06-19 DIAGNOSIS — D64.81 ANEMIA DUE TO CHEMOTHERAPY: ICD-10-CM

## 2023-06-19 PROCEDURE — 99215 PR OFFICE/OUTPT VISIT, EST, LEVL V, 40-54 MIN: ICD-10-PCS | Mod: S$GLB,,, | Performed by: NURSE PRACTITIONER

## 2023-06-19 PROCEDURE — 96417 CHEMO IV INFUS EACH ADDL SEQ: CPT

## 2023-06-19 PROCEDURE — 3044F HG A1C LEVEL LT 7.0%: CPT | Mod: CPTII,S$GLB,, | Performed by: NURSE PRACTITIONER

## 2023-06-19 PROCEDURE — 99999 PR PBB SHADOW E&M-EST. PATIENT-LVL IV: CPT | Mod: PBBFAC,,, | Performed by: NURSE PRACTITIONER

## 2023-06-19 PROCEDURE — 99215 OFFICE O/P EST HI 40 MIN: CPT | Mod: S$GLB,,, | Performed by: NURSE PRACTITIONER

## 2023-06-19 PROCEDURE — 3008F PR BODY MASS INDEX (BMI) DOCUMENTED: ICD-10-PCS | Mod: CPTII,S$GLB,, | Performed by: NURSE PRACTITIONER

## 2023-06-19 PROCEDURE — 3074F SYST BP LT 130 MM HG: CPT | Mod: CPTII,S$GLB,, | Performed by: NURSE PRACTITIONER

## 2023-06-19 PROCEDURE — 63600175 PHARM REV CODE 636 W HCPCS: Performed by: NURSE PRACTITIONER

## 2023-06-19 PROCEDURE — 3074F PR MOST RECENT SYSTOLIC BLOOD PRESSURE < 130 MM HG: ICD-10-PCS | Mod: CPTII,S$GLB,, | Performed by: NURSE PRACTITIONER

## 2023-06-19 PROCEDURE — 3044F PR MOST RECENT HEMOGLOBIN A1C LEVEL <7.0%: ICD-10-PCS | Mod: CPTII,S$GLB,, | Performed by: NURSE PRACTITIONER

## 2023-06-19 PROCEDURE — 25000003 PHARM REV CODE 250: Performed by: NURSE PRACTITIONER

## 2023-06-19 PROCEDURE — 99999 PR PBB SHADOW E&M-EST. PATIENT-LVL IV: ICD-10-PCS | Mod: PBBFAC,,, | Performed by: NURSE PRACTITIONER

## 2023-06-19 PROCEDURE — 96375 TX/PRO/DX INJ NEW DRUG ADDON: CPT

## 2023-06-19 PROCEDURE — 3078F DIAST BP <80 MM HG: CPT | Mod: CPTII,S$GLB,, | Performed by: NURSE PRACTITIONER

## 2023-06-19 PROCEDURE — 96413 CHEMO IV INFUSION 1 HR: CPT

## 2023-06-19 PROCEDURE — 3078F PR MOST RECENT DIASTOLIC BLOOD PRESSURE < 80 MM HG: ICD-10-PCS | Mod: CPTII,S$GLB,, | Performed by: NURSE PRACTITIONER

## 2023-06-19 PROCEDURE — 3008F BODY MASS INDEX DOCD: CPT | Mod: CPTII,S$GLB,, | Performed by: NURSE PRACTITIONER

## 2023-06-19 PROCEDURE — A4216 STERILE WATER/SALINE, 10 ML: HCPCS | Performed by: NURSE PRACTITIONER

## 2023-06-19 PROCEDURE — 96361 HYDRATE IV INFUSION ADD-ON: CPT

## 2023-06-19 PROCEDURE — 96367 TX/PROPH/DG ADDL SEQ IV INF: CPT

## 2023-06-19 RX ORDER — SODIUM CHLORIDE 0.9 % (FLUSH) 0.9 %
10 SYRINGE (ML) INJECTION
Status: CANCELLED | OUTPATIENT
Start: 2023-06-19

## 2023-06-19 RX ORDER — PROCHLORPERAZINE EDISYLATE 5 MG/ML
10 INJECTION INTRAMUSCULAR; INTRAVENOUS
Status: CANCELLED
Start: 2023-06-23

## 2023-06-19 RX ORDER — HEPARIN 100 UNIT/ML
500 SYRINGE INTRAVENOUS
Status: CANCELLED | OUTPATIENT
Start: 2023-06-26

## 2023-06-19 RX ORDER — SODIUM CHLORIDE 0.9 % (FLUSH) 0.9 %
10 SYRINGE (ML) INJECTION
Status: CANCELLED | OUTPATIENT
Start: 2023-06-20

## 2023-06-19 RX ORDER — SODIUM CHLORIDE 0.9 % (FLUSH) 0.9 %
10 SYRINGE (ML) INJECTION
Status: CANCELLED | OUTPATIENT
Start: 2023-06-21

## 2023-06-19 RX ORDER — SODIUM CHLORIDE 0.9 % (FLUSH) 0.9 %
10 SYRINGE (ML) INJECTION
Status: CANCELLED | OUTPATIENT
Start: 2023-06-22

## 2023-06-19 RX ORDER — SODIUM CHLORIDE 0.9 % (FLUSH) 0.9 %
10 SYRINGE (ML) INJECTION
Status: DISCONTINUED | OUTPATIENT
Start: 2023-06-19 | End: 2023-06-19 | Stop reason: HOSPADM

## 2023-06-19 RX ORDER — HEPARIN 100 UNIT/ML
500 SYRINGE INTRAVENOUS
Status: CANCELLED | OUTPATIENT
Start: 2023-06-19

## 2023-06-19 RX ORDER — HEPARIN 100 UNIT/ML
500 SYRINGE INTRAVENOUS
Status: CANCELLED | OUTPATIENT
Start: 2023-06-22

## 2023-06-19 RX ORDER — HEPARIN 100 UNIT/ML
500 SYRINGE INTRAVENOUS
Status: CANCELLED | OUTPATIENT
Start: 2023-06-21

## 2023-06-19 RX ORDER — HEPARIN 100 UNIT/ML
500 SYRINGE INTRAVENOUS
Status: CANCELLED | OUTPATIENT
Start: 2023-07-03

## 2023-06-19 RX ORDER — PROCHLORPERAZINE EDISYLATE 5 MG/ML
10 INJECTION INTRAMUSCULAR; INTRAVENOUS
Status: CANCELLED
Start: 2023-06-20

## 2023-06-19 RX ORDER — OLANZAPINE 5 MG/1
TABLET ORAL
Qty: 30 TABLET | Refills: 0 | Status: SHIPPED | OUTPATIENT
Start: 2023-06-19

## 2023-06-19 RX ORDER — SODIUM CHLORIDE 0.9 % (FLUSH) 0.9 %
10 SYRINGE (ML) INJECTION
Status: CANCELLED | OUTPATIENT
Start: 2023-06-26

## 2023-06-19 RX ORDER — SODIUM CHLORIDE 0.9 % (FLUSH) 0.9 %
10 SYRINGE (ML) INJECTION
Status: CANCELLED | OUTPATIENT
Start: 2023-07-03

## 2023-06-19 RX ORDER — HEPARIN 100 UNIT/ML
500 SYRINGE INTRAVENOUS
Status: DISCONTINUED | OUTPATIENT
Start: 2023-06-19 | End: 2023-06-19 | Stop reason: HOSPADM

## 2023-06-19 RX ORDER — PROCHLORPERAZINE EDISYLATE 5 MG/ML
10 INJECTION INTRAMUSCULAR; INTRAVENOUS
Status: CANCELLED
Start: 2023-06-21

## 2023-06-19 RX ORDER — HEPARIN 100 UNIT/ML
500 SYRINGE INTRAVENOUS
Status: CANCELLED | OUTPATIENT
Start: 2023-06-20

## 2023-06-19 RX ORDER — SODIUM CHLORIDE 0.9 % (FLUSH) 0.9 %
10 SYRINGE (ML) INJECTION
Status: CANCELLED | OUTPATIENT
Start: 2023-06-23

## 2023-06-19 RX ORDER — HEPARIN 100 UNIT/ML
500 SYRINGE INTRAVENOUS
Status: CANCELLED | OUTPATIENT
Start: 2023-06-23

## 2023-06-19 RX ADMIN — HEPARIN 500 UNITS: 100 SYRINGE at 03:06

## 2023-06-19 RX ADMIN — APREPITANT 130 MG: 130 INJECTION, EMULSION INTRAVENOUS at 11:06

## 2023-06-19 RX ADMIN — SODIUM CHLORIDE 500 ML: 9 INJECTION, SOLUTION INTRAVENOUS at 11:06

## 2023-06-19 RX ADMIN — CISPLATIN 43 MG: 1 INJECTION, SOLUTION INTRAVENOUS at 12:06

## 2023-06-19 RX ADMIN — SODIUM CHLORIDE 500 ML: 9 INJECTION, SOLUTION INTRAVENOUS at 03:06

## 2023-06-19 RX ADMIN — BLEOMYCIN SULFATE 30 UNITS: 30 POWDER, FOR SOLUTION INTRAMUSCULAR; INTRAPLEURAL; INTRAVENOUS; SUBCUTANEOUS at 02:06

## 2023-06-19 RX ADMIN — Medication 10 ML: at 03:06

## 2023-06-19 RX ADMIN — DEXAMETHASONE SODIUM PHOSPHATE 0.25 MG: 4 INJECTION, SOLUTION INTRA-ARTICULAR; INTRALESIONAL; INTRAMUSCULAR; INTRAVENOUS; SOFT TISSUE at 12:06

## 2023-06-19 RX ADMIN — ETOPOSIDE 216 MG: 20 INJECTION INTRAVENOUS at 01:06

## 2023-06-19 NOTE — PROGRESS NOTES
Subjective     Patient ID: Arturo Salguero III is a 27 y.o. male.    Chief Complaint: testicular cancer    HPI    Returns for follow up and to start C2 BEP.   Reports severe nausea after day 1.   Tried to take zofran, phenergan, and dexamethasone with no relief.   Lost appetite after day 1 as well and lost 15-20 lbs.   Also noted irritation in gums- took long time to heal.  Today, feels good  Appetite good  Slight runny nose.   No fever/chills.   No coughing/SOB.   No neuropathy  Bowels normal now. Was soft during cycle 1.   Urinating well.     MRI previously to follow up on PET scan findings      - 6/1/2023 MRI Abdomen:  FINDINGS:  Inferior Thorax: Bilateral gynecomastia.  Liver: Normal size and background parenchymal signal.  No suspicious lesion.  Hepatic and portal veins are patent.  Gallbladder: Unremarkable.  Bile Ducts: No dilatation.  Pancreas: No mass or ductal dilatation.  Spleen: Unremarkable.  Adrenals: Unremarkable.  Kidneys/Ureters: No mass or hydroureteronephrosis.  GI Tract/Mesentery: No evidence of bowel obstruction or inflammation.  Peritoneal Space: No ascites.  Retroperitoneum: Few prominent distal retroperitoneal lymph nodes, similar to prior PET-CT.  Distal aortocaval lymph node measures 1.3 cm (series [id number], image 75).  Abdominal wall: Unremarkable.  Vasculature: No aneurysm.  Bones: No suspicious marrow replacing lesion.  Impression:  1. No suspicious hepatic lesion.  2. Few prominent distal retroperitoneal lymph nodes, similar to prior PET-CT.  3. Additional findings as above.      6/14/2023 MRI T spine:  FINDINGS:  Alignment: Normal.  Vertebrae: Normal marrow signal. No fracture.  No enhancing osseous lesions.  Discs: Disc heights are maintained.  No evidence for discitis.  Cord: Normal morphology and signal.  Enhancement: No abnormal enhancement.  Degenerative findings: No spinal canal stenosis or neural foraminal narrowing at any level.  Paraspinal muscles & soft tissues:  Unremarkable.  Impression:  1. Normal examination.  No evidence for metastatic disease.  Electronically signed by resident: Holden Adame  Date:                                            06/14/2023  Time:                                           16:23  Electronically signed by: Babak Cadena MD  Date:                                            06/14/2023  Time:                                           17:04           Oncology History:  - noted right testicular swelling and went to his PCP     - 3/20/2023 Testicular Ultrasound:  FINDINGS:  Right Testicle:  *Size: 7.5 x 5.3 x 5.8 cm  *Appearance: Markedly heterogeneous echotexture.  *Flow: Normal arterial and venous flow  *Epididymis: Normal.  *Hydrocele: None.  *Varicocele: None.     Left Testicle:  *Size: 7.7 x 5.2 x 5.6 cm  *Appearance: Markedly heterogeneous echotexture.  *Flow: Normal arterial and venous flow  *Epididymis: Normal.  *Hydrocele: None.  *Varicocele: None.     Other findings: None.  Impression:  Bilateral testicular enlargement with markedly heterogeneous echotexture, concerning for infiltrative process such as lymphoma.  Correlation with prior history is recommended as sequela from bilateral remote orchitis may give a similar appearance.  Consultation with urology is recommended.  This report was flagged in Epic as abnormal.     - 3/21/2023 CT C/A/P:  FINDINGS:  BASE OF NECK: No significant abnormality.  Thyroid gland unremarkable.  THORACIC SOFT TISSUES: No axillary adenopathy.  AORTA: Thoracic aorta maintains normal caliber.  Left-sided aortic arch with normal three vessel branching pattern.  No calcific atherosclerosis of the thoracic aorta.  HEART: Normal size with physiologic pericardial fluid.  PULMONARY VASCULATURE: Unremarkable.  EMILY/MEDIASTINUM: No pathologic alexandra enlargement.  AIRWAYS: Trachea and proximal airways remain patent.  LUNGS/PLEURA: Lungs are symmetrically well expanded.  No focal consolidation, mass, pneumothorax, or  pleural fluid.  LIVER: Normal in size and contour.  No focal hepatic lesion.  GALLBLADDER: No calcified gallstones.  BILE DUCTS: No evidence of dilated ducts.  PANCREAS: No mass or peripancreatic fat stranding.  SPLEEN: No splenomegaly.  ADRENALS: Unremarkable.  KIDNEYS/URETERS: Normal in size and location with normal enhancement.  No hydronephrosis or nephrolithiasis. No ureteral dilatation.  BLADDER: No evidence of wall thickening.  REPRODUCTIVE ORGANS: Heterogeneous 6.5 x 6.2 cm mass-like enlargement of right testicle, correlate with 03/20/2023 ultrasound.  STOMACH/DUODENUM: Unremarkable.  BOWEL/MESENTERY: Small bowel is normal in caliber with no evidence of obstruction. No evidence of inflammation or wall thickening.  Colon demonstrates no focal wall thickening.  Appendix is visualized and appears normal.  PERITONEUM: No intraperitoneal free air or fluid.  LYMPH NODES: Enlarged 1.1 cm pericaval node (axial 3:99).  No pelvic or inguinal lymphadenopathy.  VASCULATURE: No significant calcific atherosclerosis.  Portal venous system is patent.  ABDOMINAL WALL:  Small fat containing umbilical hernia.  BONES: No acute fracture. No suspicious osseous lesions.  Impression:  1. Enlarged 1.1 cm pericaval lymph node.  No additional enlarged lymph nodes throughout the abdomen.  No pelvic or inguinal lymphadenopathy.  2. Heterogeneous 6.5 x 6.2 cm mass-like enlargement of right testicle, correlate with 03/20/2023 ultrasound.  3. Other findings above.  This report was flagged in Epic as abnormal.     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   - See CAP synoptic report below.   CASE SUMMARY: (TESTIS: Radical Orchiectomy)   Standard(s): AJCC-UICC 8   SPECIMEN   Specimen Laterality: Right.   Tumor Focality: Unifocal.   Tumor Size: Greatest dimension of main tumor mass in Centimeters (cm): 6.7 cm.   Histologic Type: Mixed germ cell tumor.    Embryonal carcinoma (specify percentage): 50 %   Yolk sac tumor, postpubertal type (specify percentage): 40 %   Choriocarcinoma (specify percentage): 10 %   Tumor Extent: Invades rete testis.   Lymphovascular Invasion: Present.   Margin Status: All margins negative for tumor.   Regional Lymph Node Status: Not applicable (no regional lymph nodes submitted or found).   Distant Site: Not applicable.   PATHOLOGIC STAGE CLASSIFICATION (pTNM, AJCC 8th Edition): pT2 pN not assigned (no nodes submitted or found).   Blocks for potential molecular or ancillary studies:   Tumor block: 1I or 1L.      PMH:  None     FH:  Mother living at age 55 yo, healthy overall  Father, living at age 55 yo, healthy  1 brother- Type 1 DM- 25 yo  1 sister- healthy  No major health issues known extended family     SH:  Apartment work, maintenance A/C  Good support system  No EtOH (Rare social)  No tobacco     Review of Systems  No changes    Review of Systems   Constitutional:         See Above   All other systems reviewed and are negative.       Objective     Physical Exam  Vitals and nursing note reviewed.   Constitutional:       General: He is not in acute distress.     Appearance: Normal appearance. He is well-developed and normal weight. He is not ill-appearing.   HENT:      Head: Normocephalic and atraumatic.   Eyes:      General: No scleral icterus.     Extraocular Movements: Extraocular movements intact.      Conjunctiva/sclera: Conjunctivae normal.      Pupils: Pupils are equal, round, and reactive to light.   Neck:      Thyroid: No thyromegaly.      Trachea: No tracheal deviation.   Cardiovascular:      Rate and Rhythm: Normal rate and regular rhythm.      Heart sounds: Normal heart sounds. No murmur heard.    No friction rub. No gallop.   Pulmonary:      Effort: Pulmonary effort is normal. No respiratory distress.      Breath sounds: Normal breath sounds. No wheezing, rhonchi or rales.   Chest:      Chest wall: No tenderness.    Abdominal:      General: Bowel sounds are normal. There is no distension.      Palpations: Abdomen is soft. There is no mass.      Tenderness: There is no abdominal tenderness. There is no guarding or rebound.   Musculoskeletal:         General: No swelling or tenderness. Normal range of motion.      Cervical back: Normal range of motion and neck supple.      Right lower leg: No edema.      Left lower leg: No edema.   Lymphadenopathy:      Cervical: No cervical adenopathy.   Skin:     General: Skin is warm and dry.      Coloration: Skin is not jaundiced or pale.      Findings: No erythema, lesion or rash.      Comments: Faint mottled type pattern to lower back   Neurological:      General: No focal deficit present.      Mental Status: He is alert and oriented to person, place, and time. Mental status is at baseline.      Sensory: No sensory deficit.      Motor: No weakness or abnormal muscle tone.      Coordination: Coordination normal.      Gait: Gait normal.      Deep Tendon Reflexes: Reflexes are normal and symmetric. Reflexes normal.   Psychiatric:         Mood and Affect: Mood normal.         Behavior: Behavior normal.         Thought Content: Thought content normal.         Judgment: Judgment normal.     Labs- reviewed     Assessment and Plan     1. Malignant neoplasm of descended right testis  OLANZapine (ZYPREXA) 5 MG tablet    DISCONTINUED: aprepitant (CINVANTI) injection 130 mg    DISCONTINUED: palonosetron 0.25mg/dexAMETHasone 10mg in NS IVPB 0.25 mg 50 mL    DISCONTINUED: sodium chloride 0.9% bolus 500 mL 500 mL    DISCONTINUED: CISplatin (Platinol) 20 mg/m2 = 43 mg in sodium chloride 0.9% 293 mL chemo infusion    DISCONTINUED: etoposide (VEPESID) 100 mg/m2 = 216 mg in sodium chloride 0.9% 510.8 mL chemo infusion    DISCONTINUED: bleomycin (BLEOCIN) 30 Units in sodium chloride 0.9% 110 mL chemo infusion    DISCONTINUED: sodium chloride 0.9% bolus 500 mL 500 mL    DISCONTINUED: sodium chloride 0.9%  flush 10 mL    DISCONTINUED: heparin, porcine (PF) 100 unit/mL injection flush 500 Units    DISCONTINUED: alteplase injection 2 mg      2. Nausea and vomiting, unspecified vomiting type        3. Anemia due to chemotherapy        4. Chemotherapy induced nausea and vomiting  OLANZapine (ZYPREXA) 5 MG tablet          Complete C1 of BEP; needs 3 cycles total  Ok to proceed with C2D1. Appts set for cycle.   Labs reviewed.   . Will not delay treatment. (Manual )  Add Neupogen for days 9-11  Anemia parameters noted and will monitor.   Salt and soda solution rinse  Vit e to ulcers/irritation in oral mucosa.  N/V - reviewed medications and wrote out plan previously  Rx Zyprexa 5mg at night x 7 days.   Neutropenic precautions.     Note: discussed case with Dr. Alston who agrees with above.     Addendum: .       Route Chart for Scheduling    Med Onc Chart Routing      Follow up with physician . Appts set   Follow up with GREGORY    Infusion scheduling note    Injection scheduling note    Labs    Imaging    Pharmacy appointment    Other referrals          Treatment Plan Information   OP TESTICULAR BEP 5-DAY Q3W   Zenobia Alston MD   Upcoming Treatment Dates - OP TESTICULAR BEP 5-DAY Q3W    6/20/2023       Pre-Medications       dexAMETHasone (DECADRON) 10 mg in sodium chloride 0.9% 50 mL IVPB       prochlorperazine injection Soln 10 mg       Chemotherapy       CISplatin (Platinol) 20 mg/m2 = 43 mg in sodium chloride 0.9% 543 mL chemo infusion       etoposide (VEPESID) 100 mg/m2 = 216 mg in sodium chloride 0.9% 575.8 mL chemo infusion       Pre-Hydration       sodium chloride 0.9% bolus 500 mL 500 mL       Post-Hydration       sodium chloride 0.9% bolus 500 mL 500 mL  6/21/2023       Pre-Medications       dexAMETHasone (DECADRON) 10 mg in sodium chloride 0.9% 50 mL IVPB       prochlorperazine injection Soln 10 mg       Chemotherapy       CISplatin (Platinol) 20 mg/m2 = 43 mg in sodium chloride 0.9% 543 mL chemo  infusion       etoposide (VEPESID) 100 mg/m2 = 216 mg in sodium chloride 0.9% 510.8 mL chemo infusion       Pre-Hydration       sodium chloride 0.9% bolus 500 mL 500 mL       Post-Hydration       sodium chloride 0.9% bolus 500 mL 500 mL  6/22/2023       Pre-Medications       palonosetron 0.25mg/dexAMETHasone 10mg in NS IVPB 0.25 mg 50 mL       Chemotherapy       CISplatin (Platinol) 20 mg/m2 = 43 mg in sodium chloride 0.9% 543 mL chemo infusion       etoposide (VEPESID) 100 mg/m2 = 216 mg in sodium chloride 0.9% 510.8 mL chemo infusion       Pre-Hydration       sodium chloride 0.9% bolus 500 mL 500 mL       Post-Hydration       sodium chloride 0.9% bolus 500 mL 500 mL  6/23/2023       Pre-Medications       dexAMETHasone (DECADRON) 10 mg in sodium chloride 0.9% 50 mL IVPB       prochlorperazine injection Soln 10 mg       Chemotherapy       CISplatin (Platinol) 20 mg/m2 = 43 mg in sodium chloride 0.9% 543 mL chemo infusion       etoposide (VEPESID) 100 mg/m2 = 216 mg in sodium chloride 0.9% 510.8 mL chemo infusion       Pre-Hydration       sodium chloride 0.9% bolus 500 mL 500 mL       Post-Hydration       sodium chloride 0.9% bolus 500 mL 500 mL      Patient is in agreement with the proposed treatment plan. All questions were answered to the patient's satisfaction. Pt knows to call clinic for any new or worsening symptoms and if anything is needed before the next clinic visit.      LONNIE HankinsP-C  Hematology & Oncology  [facility] [place] [address]  ph. [phone]  Fax. [phone]       40 minutes of total time spent on the encounter, which includes face to face time and non-face to face time preparing to see the patient (eg, review of tests), Obtaining and/or reviewing separately obtained history, Documenting clinical information in the electronic or other health record, Independently interpreting results (not separately reported) and communicating results to the  patient/family/caregiver, or Care coordination (not separately reported).

## 2023-06-19 NOTE — NURSING
Tolerated treatment well.  Advised to call MD for any problems or concerns.  AVS declined.  Prefers MyChart.  Advised to monitor for s/sx of infection and report to provider.  PAC flushed, hep-locked, and de-accessed.  RTC on tomorrow.  Discharged home stable.

## 2023-06-19 NOTE — PLAN OF CARE
Dx: Testicular cancer    Arrived for c2d1 Cisplatin, Etoposide, Bleomycin.  No complaints reported.  Eating and drinking well.  Labs noted with ANC below parameters.  LISA given.  Neutropenic precautions in place. Pt informed to monitor for s/sx of infection/fever and report to provider.    ACCESS: [facility] PAC  Premeds: cinvanti, Dex/Aloxi 10/0.25mg  Pre/post hydration  Tx:  Cisplatin, Etoposide, Bleomycin.

## 2023-06-20 ENCOUNTER — INFUSION (OUTPATIENT)
Dept: INFUSION THERAPY | Facility: HOSPITAL | Age: 28
End: 2023-06-20
Payer: COMMERCIAL

## 2023-06-20 VITALS
WEIGHT: 207.31 LBS | BODY MASS INDEX: 29.68 KG/M2 | RESPIRATION RATE: 18 BRPM | HEART RATE: 95 BPM | HEIGHT: 70 IN | SYSTOLIC BLOOD PRESSURE: 123 MMHG | TEMPERATURE: 98 F | DIASTOLIC BLOOD PRESSURE: 79 MMHG

## 2023-06-20 DIAGNOSIS — C62.11 MALIGNANT NEOPLASM OF DESCENDED RIGHT TESTIS: Primary | ICD-10-CM

## 2023-06-20 PROCEDURE — 63600175 PHARM REV CODE 636 W HCPCS: Performed by: NURSE PRACTITIONER

## 2023-06-20 PROCEDURE — 25000003 PHARM REV CODE 250: Performed by: INTERNAL MEDICINE

## 2023-06-20 PROCEDURE — 96413 CHEMO IV INFUSION 1 HR: CPT

## 2023-06-20 PROCEDURE — 96367 TX/PROPH/DG ADDL SEQ IV INF: CPT

## 2023-06-20 PROCEDURE — 25000003 PHARM REV CODE 250: Performed by: NURSE PRACTITIONER

## 2023-06-20 PROCEDURE — 96375 TX/PRO/DX INJ NEW DRUG ADDON: CPT

## 2023-06-20 PROCEDURE — A4216 STERILE WATER/SALINE, 10 ML: HCPCS | Performed by: NURSE PRACTITIONER

## 2023-06-20 PROCEDURE — 96417 CHEMO IV INFUS EACH ADDL SEQ: CPT

## 2023-06-20 RX ORDER — SODIUM CHLORIDE 0.9 % (FLUSH) 0.9 %
10 SYRINGE (ML) INJECTION
Status: DISCONTINUED | OUTPATIENT
Start: 2023-06-20 | End: 2023-06-20 | Stop reason: HOSPADM

## 2023-06-20 RX ORDER — PROCHLORPERAZINE EDISYLATE 5 MG/ML
10 INJECTION INTRAMUSCULAR; INTRAVENOUS
Status: DISCONTINUED | OUTPATIENT
Start: 2023-06-20 | End: 2023-06-20 | Stop reason: HOSPADM

## 2023-06-20 RX ORDER — HEPARIN 100 UNIT/ML
500 SYRINGE INTRAVENOUS
Status: DISCONTINUED | OUTPATIENT
Start: 2023-06-20 | End: 2023-06-20 | Stop reason: HOSPADM

## 2023-06-20 RX ADMIN — SODIUM CHLORIDE: 9 INJECTION, SOLUTION INTRAVENOUS at 09:06

## 2023-06-20 RX ADMIN — ETOPOSIDE 216 MG: 20 INJECTION INTRAVENOUS at 11:06

## 2023-06-20 RX ADMIN — SODIUM CHLORIDE 500 ML: 9 INJECTION, SOLUTION INTRAVENOUS at 09:06

## 2023-06-20 RX ADMIN — HEPARIN 500 UNITS: 100 SYRINGE at 12:06

## 2023-06-20 RX ADMIN — Medication 10 ML: at 12:06

## 2023-06-20 RX ADMIN — DEXAMETHASONE SODIUM PHOSPHATE 10 MG: 4 INJECTION, SOLUTION INTRA-ARTICULAR; INTRALESIONAL; INTRAMUSCULAR; INTRAVENOUS; SOFT TISSUE at 09:06

## 2023-06-20 RX ADMIN — CISPLATIN 43 MG: 1 INJECTION, SOLUTION INTRAVENOUS at 09:06

## 2023-06-20 RX ADMIN — SODIUM CHLORIDE 500 ML: 9 INJECTION, SOLUTION INTRAVENOUS at 11:06

## 2023-06-20 NOTE — NURSING
0804  Pt here for Cisplatin/Etoposide/IVFs, accompanied by sig other, no new complaints or concerns and reports tolerating treatment; discussed treatment plan for today, all questions answered and pt agrees to proceed

## 2023-06-20 NOTE — PLAN OF CARE
1215  Infusion completed, pt tolerated; pt instructed to monitor rash on back for worsening condition, report to MD as needed; discussed when to contact MD, when to report to ER; pt verbalized understanding of all discussed and when to report next

## 2023-06-21 ENCOUNTER — PATIENT MESSAGE (OUTPATIENT)
Dept: HEMATOLOGY/ONCOLOGY | Facility: CLINIC | Age: 28
End: 2023-06-21
Payer: COMMERCIAL

## 2023-06-21 ENCOUNTER — INFUSION (OUTPATIENT)
Dept: INFUSION THERAPY | Facility: HOSPITAL | Age: 28
End: 2023-06-21
Payer: COMMERCIAL

## 2023-06-21 VITALS
HEART RATE: 80 BPM | DIASTOLIC BLOOD PRESSURE: 75 MMHG | RESPIRATION RATE: 18 BRPM | WEIGHT: 207.25 LBS | TEMPERATURE: 98 F | BODY MASS INDEX: 29.67 KG/M2 | SYSTOLIC BLOOD PRESSURE: 134 MMHG | HEIGHT: 70 IN

## 2023-06-21 DIAGNOSIS — C62.11 MALIGNANT NEOPLASM OF DESCENDED RIGHT TESTIS: Primary | ICD-10-CM

## 2023-06-21 PROCEDURE — 96413 CHEMO IV INFUSION 1 HR: CPT

## 2023-06-21 PROCEDURE — 63600175 PHARM REV CODE 636 W HCPCS: Performed by: NURSE PRACTITIONER

## 2023-06-21 PROCEDURE — 96361 HYDRATE IV INFUSION ADD-ON: CPT

## 2023-06-21 PROCEDURE — 96417 CHEMO IV INFUS EACH ADDL SEQ: CPT

## 2023-06-21 PROCEDURE — 96367 TX/PROPH/DG ADDL SEQ IV INF: CPT

## 2023-06-21 PROCEDURE — A4216 STERILE WATER/SALINE, 10 ML: HCPCS | Performed by: NURSE PRACTITIONER

## 2023-06-21 PROCEDURE — 25000003 PHARM REV CODE 250: Performed by: NURSE PRACTITIONER

## 2023-06-21 RX ORDER — PROCHLORPERAZINE EDISYLATE 5 MG/ML
10 INJECTION INTRAMUSCULAR; INTRAVENOUS
Status: DISCONTINUED | OUTPATIENT
Start: 2023-06-21 | End: 2023-06-21 | Stop reason: HOSPADM

## 2023-06-21 RX ORDER — SODIUM CHLORIDE 0.9 % (FLUSH) 0.9 %
10 SYRINGE (ML) INJECTION
Status: DISCONTINUED | OUTPATIENT
Start: 2023-06-21 | End: 2023-06-21 | Stop reason: HOSPADM

## 2023-06-21 RX ORDER — HEPARIN 100 UNIT/ML
500 SYRINGE INTRAVENOUS
Status: DISCONTINUED | OUTPATIENT
Start: 2023-06-21 | End: 2023-06-21 | Stop reason: HOSPADM

## 2023-06-21 RX ADMIN — DEXAMETHASONE SODIUM PHOSPHATE 10 MG: 4 INJECTION, SOLUTION INTRA-ARTICULAR; INTRALESIONAL; INTRAMUSCULAR; INTRAVENOUS; SOFT TISSUE at 09:06

## 2023-06-21 RX ADMIN — HEPARIN 500 UNITS: 100 SYRINGE at 12:06

## 2023-06-21 RX ADMIN — SODIUM CHLORIDE 500 ML: 0.9 INJECTION, SOLUTION INTRAVENOUS at 12:06

## 2023-06-21 RX ADMIN — CISPLATIN 43 MG: 1 INJECTION, SOLUTION INTRAVENOUS at 09:06

## 2023-06-21 RX ADMIN — ETOPOSIDE 216 MG: 20 INJECTION INTRAVENOUS at 11:06

## 2023-06-21 RX ADMIN — SODIUM CHLORIDE 500 ML: 0.9 INJECTION, SOLUTION INTRAVENOUS at 08:06

## 2023-06-21 RX ADMIN — Medication 10 ML: at 12:06

## 2023-06-22 ENCOUNTER — INFUSION (OUTPATIENT)
Dept: INFUSION THERAPY | Facility: HOSPITAL | Age: 28
End: 2023-06-22
Payer: COMMERCIAL

## 2023-06-22 VITALS
HEART RATE: 68 BPM | BODY MASS INDEX: 29.86 KG/M2 | WEIGHT: 208.56 LBS | SYSTOLIC BLOOD PRESSURE: 143 MMHG | HEIGHT: 70 IN | RESPIRATION RATE: 18 BRPM | DIASTOLIC BLOOD PRESSURE: 86 MMHG | OXYGEN SATURATION: 98 % | TEMPERATURE: 98 F

## 2023-06-22 DIAGNOSIS — C62.11 MALIGNANT NEOPLASM OF DESCENDED RIGHT TESTIS: ICD-10-CM

## 2023-06-22 DIAGNOSIS — C62.11 MALIGNANT NEOPLASM OF DESCENDED RIGHT TESTIS: Primary | ICD-10-CM

## 2023-06-22 PROCEDURE — A4216 STERILE WATER/SALINE, 10 ML: HCPCS | Performed by: NURSE PRACTITIONER

## 2023-06-22 PROCEDURE — 96367 TX/PROPH/DG ADDL SEQ IV INF: CPT

## 2023-06-22 PROCEDURE — 63600175 PHARM REV CODE 636 W HCPCS: Performed by: NURSE PRACTITIONER

## 2023-06-22 PROCEDURE — 96417 CHEMO IV INFUS EACH ADDL SEQ: CPT

## 2023-06-22 PROCEDURE — 25000003 PHARM REV CODE 250: Performed by: NURSE PRACTITIONER

## 2023-06-22 PROCEDURE — 96413 CHEMO IV INFUSION 1 HR: CPT

## 2023-06-22 RX ORDER — HEPARIN 100 UNIT/ML
500 SYRINGE INTRAVENOUS
Status: DISCONTINUED | OUTPATIENT
Start: 2023-06-22 | End: 2023-06-22 | Stop reason: HOSPADM

## 2023-06-22 RX ORDER — SODIUM CHLORIDE 0.9 % (FLUSH) 0.9 %
10 SYRINGE (ML) INJECTION
Status: DISCONTINUED | OUTPATIENT
Start: 2023-06-22 | End: 2023-06-22 | Stop reason: HOSPADM

## 2023-06-22 RX ORDER — LIDOCAINE AND PRILOCAINE 25; 25 MG/G; MG/G
CREAM TOPICAL
Qty: 30 G | Refills: 5 | Status: SHIPPED | OUTPATIENT
Start: 2023-06-22

## 2023-06-22 RX ADMIN — CISPLATIN 43 MG: 1 INJECTION, SOLUTION INTRAVENOUS at 10:06

## 2023-06-22 RX ADMIN — HEPARIN 500 UNITS: 100 SYRINGE at 12:06

## 2023-06-22 RX ADMIN — SODIUM CHLORIDE 500 ML: 9 INJECTION, SOLUTION INTRAVENOUS at 11:06

## 2023-06-22 RX ADMIN — DEXAMETHASONE SODIUM PHOSPHATE 0.25 MG: 4 INJECTION, SOLUTION INTRA-ARTICULAR; INTRALESIONAL; INTRAMUSCULAR; INTRAVENOUS; SOFT TISSUE at 09:06

## 2023-06-22 RX ADMIN — SODIUM CHLORIDE 500 ML: 9 INJECTION, SOLUTION INTRAVENOUS at 09:06

## 2023-06-22 RX ADMIN — ETOPOSIDE 216 MG: 20 INJECTION INTRAVENOUS at 11:06

## 2023-06-22 RX ADMIN — Medication 10 ML: at 12:06

## 2023-06-22 NOTE — PLAN OF CARE
1245 Patient tolerated C2D4 Cisplatin/ Etoposide/ IVFs without incident. Vitals stable before and after infusion. Port with brisk blood return and flushed without resistance before, during and after infusion, heparin locked and access maintained for tomorrow's infusion. Curos caps in place. Patient aware of his next appointment date/time (tomorrow at 0800), uses [facility]. To contact provider with questions or concerns. D/C ambulatory and stable.

## 2023-06-23 ENCOUNTER — INFUSION (OUTPATIENT)
Dept: INFUSION THERAPY | Facility: HOSPITAL | Age: 28
End: 2023-06-23
Payer: COMMERCIAL

## 2023-06-23 VITALS
TEMPERATURE: 98 F | BODY MASS INDEX: 29.86 KG/M2 | HEIGHT: 70 IN | DIASTOLIC BLOOD PRESSURE: 82 MMHG | SYSTOLIC BLOOD PRESSURE: 118 MMHG | WEIGHT: 208.56 LBS | RESPIRATION RATE: 18 BRPM | HEART RATE: 80 BPM

## 2023-06-23 DIAGNOSIS — C62.11 MALIGNANT NEOPLASM OF DESCENDED RIGHT TESTIS: Primary | ICD-10-CM

## 2023-06-23 PROCEDURE — 96367 TX/PROPH/DG ADDL SEQ IV INF: CPT

## 2023-06-23 PROCEDURE — 96413 CHEMO IV INFUSION 1 HR: CPT

## 2023-06-23 PROCEDURE — 96361 HYDRATE IV INFUSION ADD-ON: CPT

## 2023-06-23 PROCEDURE — 96417 CHEMO IV INFUS EACH ADDL SEQ: CPT

## 2023-06-23 PROCEDURE — 25000003 PHARM REV CODE 250: Performed by: NURSE PRACTITIONER

## 2023-06-23 PROCEDURE — 63600175 PHARM REV CODE 636 W HCPCS: Performed by: NURSE PRACTITIONER

## 2023-06-23 RX ORDER — PROCHLORPERAZINE EDISYLATE 5 MG/ML
10 INJECTION INTRAMUSCULAR; INTRAVENOUS
Status: DISCONTINUED | OUTPATIENT
Start: 2023-06-23 | End: 2023-06-23 | Stop reason: HOSPADM

## 2023-06-23 RX ORDER — HEPARIN 100 UNIT/ML
500 SYRINGE INTRAVENOUS
Status: DISCONTINUED | OUTPATIENT
Start: 2023-06-23 | End: 2023-06-23 | Stop reason: HOSPADM

## 2023-06-23 RX ORDER — SODIUM CHLORIDE 0.9 % (FLUSH) 0.9 %
10 SYRINGE (ML) INJECTION
Status: DISCONTINUED | OUTPATIENT
Start: 2023-06-23 | End: 2023-06-23 | Stop reason: HOSPADM

## 2023-06-23 RX ADMIN — HEPARIN 500 UNITS: 100 SYRINGE at 01:06

## 2023-06-23 RX ADMIN — CISPLATIN 43 MG: 1 INJECTION, SOLUTION INTRAVENOUS at 10:06

## 2023-06-23 RX ADMIN — ETOPOSIDE 216 MG: 20 INJECTION INTRAVENOUS at 11:06

## 2023-06-23 RX ADMIN — DEXAMETHASONE SODIUM PHOSPHATE 10 MG: 4 INJECTION, SOLUTION INTRA-ARTICULAR; INTRALESIONAL; INTRAMUSCULAR; INTRAVENOUS; SOFT TISSUE at 09:06

## 2023-06-23 RX ADMIN — SODIUM CHLORIDE 500 ML: 0.9 INJECTION, SOLUTION INTRAVENOUS at 12:06

## 2023-06-23 RX ADMIN — SODIUM CHLORIDE 500 ML: 0.9 INJECTION, SOLUTION INTRAVENOUS at 09:06

## 2023-06-26 ENCOUNTER — INFUSION (OUTPATIENT)
Dept: INFUSION THERAPY | Facility: HOSPITAL | Age: 28
End: 2023-06-26
Payer: COMMERCIAL

## 2023-06-26 ENCOUNTER — LAB VISIT (OUTPATIENT)
Dept: LAB | Facility: HOSPITAL | Age: 28
End: 2023-06-26
Attending: INTERNAL MEDICINE
Payer: COMMERCIAL

## 2023-06-26 VITALS
RESPIRATION RATE: 18 BRPM | TEMPERATURE: 98 F | DIASTOLIC BLOOD PRESSURE: 79 MMHG | SYSTOLIC BLOOD PRESSURE: 126 MMHG | BODY MASS INDEX: 27.55 KG/M2 | WEIGHT: 192.44 LBS | HEART RATE: 91 BPM | HEIGHT: 70 IN

## 2023-06-26 DIAGNOSIS — C62.11 MALIGNANT NEOPLASM OF DESCENDED RIGHT TESTIS: Primary | ICD-10-CM

## 2023-06-26 DIAGNOSIS — C62.11 MALIGNANT NEOPLASM OF DESCENDED RIGHT TESTIS: ICD-10-CM

## 2023-06-26 LAB
ALBUMIN SERPL BCP-MCNC: 4.1 G/DL (ref 3.5–5.2)
ALP SERPL-CCNC: 63 U/L (ref 55–135)
ALT SERPL W/O P-5'-P-CCNC: 16 U/L (ref 10–44)
ANION GAP SERPL CALC-SCNC: 14 MMOL/L (ref 8–16)
ANISOCYTOSIS BLD QL SMEAR: SLIGHT
AST SERPL-CCNC: 18 U/L (ref 10–40)
BASOPHILS # BLD AUTO: 0.02 K/UL (ref 0–0.2)
BASOPHILS NFR BLD: 0.4 % (ref 0–1.9)
BILIRUB SERPL-MCNC: 0.9 MG/DL (ref 0.1–1)
BUN SERPL-MCNC: 27 MG/DL (ref 6–20)
CALCIUM SERPL-MCNC: 10 MG/DL (ref 8.7–10.5)
CHLORIDE SERPL-SCNC: 96 MMOL/L (ref 95–110)
CO2 SERPL-SCNC: 28 MMOL/L (ref 23–29)
CREAT SERPL-MCNC: 1.4 MG/DL (ref 0.5–1.4)
DACRYOCYTES BLD QL SMEAR: ABNORMAL
DIFFERENTIAL METHOD: ABNORMAL
EOSINOPHIL # BLD AUTO: 0 K/UL (ref 0–0.5)
EOSINOPHIL NFR BLD: 0.2 % (ref 0–8)
ERYTHROCYTE [DISTWIDTH] IN BLOOD BY AUTOMATED COUNT: 14.8 % (ref 11.5–14.5)
EST. GFR  (NO RACE VARIABLE): >60 ML/MIN/1.73 M^2
GLUCOSE SERPL-MCNC: 104 MG/DL (ref 70–110)
HCT VFR BLD AUTO: 47.8 % (ref 40–54)
HGB BLD-MCNC: 15.5 G/DL (ref 14–18)
IMM GRANULOCYTES # BLD AUTO: 0.06 K/UL (ref 0–0.04)
IMM GRANULOCYTES NFR BLD AUTO: 1.2 % (ref 0–0.5)
LYMPHOCYTES # BLD AUTO: 1.6 K/UL (ref 1–4.8)
LYMPHOCYTES NFR BLD: 31.2 % (ref 18–48)
MCH RBC QN AUTO: 27 PG (ref 27–31)
MCHC RBC AUTO-ENTMCNC: 32.4 G/DL (ref 32–36)
MCV RBC AUTO: 83 FL (ref 82–98)
MONOCYTES # BLD AUTO: 0.1 K/UL (ref 0.3–1)
MONOCYTES NFR BLD: 1.6 % (ref 4–15)
NEUTROPHILS # BLD AUTO: 3.4 K/UL (ref 1.8–7.7)
NEUTROPHILS NFR BLD: 65.4 % (ref 38–73)
NRBC BLD-RTO: 0 /100 WBC
OVALOCYTES BLD QL SMEAR: ABNORMAL
PLATELET # BLD AUTO: 380 K/UL (ref 150–450)
PLATELET BLD QL SMEAR: ABNORMAL
PMV BLD AUTO: 9.6 FL (ref 9.2–12.9)
POIKILOCYTOSIS BLD QL SMEAR: SLIGHT
POTASSIUM SERPL-SCNC: 4.7 MMOL/L (ref 3.5–5.1)
PROT SERPL-MCNC: 7.8 G/DL (ref 6–8.4)
RBC # BLD AUTO: 5.74 M/UL (ref 4.6–6.2)
SODIUM SERPL-SCNC: 138 MMOL/L (ref 136–145)
WBC # BLD AUTO: 5.16 K/UL (ref 3.9–12.7)

## 2023-06-26 PROCEDURE — 96375 TX/PRO/DX INJ NEW DRUG ADDON: CPT

## 2023-06-26 PROCEDURE — 25000003 PHARM REV CODE 250: Performed by: NURSE PRACTITIONER

## 2023-06-26 PROCEDURE — A4216 STERILE WATER/SALINE, 10 ML: HCPCS | Performed by: NURSE PRACTITIONER

## 2023-06-26 PROCEDURE — 85025 COMPLETE CBC W/AUTO DIFF WBC: CPT | Performed by: INTERNAL MEDICINE

## 2023-06-26 PROCEDURE — 25000003 PHARM REV CODE 250: Performed by: INTERNAL MEDICINE

## 2023-06-26 PROCEDURE — 96361 HYDRATE IV INFUSION ADD-ON: CPT

## 2023-06-26 PROCEDURE — 63600175 PHARM REV CODE 636 W HCPCS: Performed by: NURSE PRACTITIONER

## 2023-06-26 PROCEDURE — 63600175 PHARM REV CODE 636 W HCPCS: Performed by: INTERNAL MEDICINE

## 2023-06-26 PROCEDURE — 36415 COLL VENOUS BLD VENIPUNCTURE: CPT | Performed by: INTERNAL MEDICINE

## 2023-06-26 PROCEDURE — 96413 CHEMO IV INFUSION 1 HR: CPT

## 2023-06-26 PROCEDURE — 80053 COMPREHEN METABOLIC PANEL: CPT | Performed by: INTERNAL MEDICINE

## 2023-06-26 RX ORDER — LORAZEPAM 2 MG/ML
1 INJECTION INTRAMUSCULAR ONCE
Status: COMPLETED | OUTPATIENT
Start: 2023-06-26 | End: 2023-06-26

## 2023-06-26 RX ORDER — ONDANSETRON 2 MG/ML
4 INJECTION INTRAMUSCULAR; INTRAVENOUS
Status: COMPLETED | OUTPATIENT
Start: 2023-06-26 | End: 2023-06-26

## 2023-06-26 RX ORDER — SODIUM CHLORIDE 0.9 % (FLUSH) 0.9 %
10 SYRINGE (ML) INJECTION
Status: DISCONTINUED | OUTPATIENT
Start: 2023-06-26 | End: 2023-06-26 | Stop reason: HOSPADM

## 2023-06-26 RX ORDER — HEPARIN 100 UNIT/ML
500 SYRINGE INTRAVENOUS
Status: DISCONTINUED | OUTPATIENT
Start: 2023-06-26 | End: 2023-06-26 | Stop reason: HOSPADM

## 2023-06-26 RX ADMIN — HEPARIN 500 UNITS: 100 SYRINGE at 02:06

## 2023-06-26 RX ADMIN — Medication 10 ML: at 02:06

## 2023-06-26 RX ADMIN — LORAZEPAM 1 MG: 2 INJECTION INTRAMUSCULAR; INTRAVENOUS at 12:06

## 2023-06-26 RX ADMIN — ONDANSETRON 4 MG: 2 INJECTION INTRAMUSCULAR; INTRAVENOUS at 12:06

## 2023-06-26 RX ADMIN — BLEOMYCIN SULFATE 30 UNITS: 30 POWDER, FOR SOLUTION INTRAMUSCULAR; INTRAPLEURAL; INTRAVENOUS; SUBCUTANEOUS at 02:06

## 2023-06-26 RX ADMIN — SODIUM CHLORIDE 999 ML: 9 INJECTION, SOLUTION INTRAVENOUS at 12:06

## 2023-06-26 NOTE — PLAN OF CARE
Pt tolerated treatment well. Port deaccessed after flushing. Pt is aware that he does not need neupogen for the next three days. Ambulatory from clinic in [facility].

## 2023-06-26 NOTE — PLAN OF CARE
Pt ambulatory to clinic with sig other for bleo treatment today. + for nausea and vomit. Dr Alston aware and IVF's, zofran and Ativan ordered and given. Pt resting quietly now. Waiting bleo. In NAD.

## 2023-06-29 ENCOUNTER — PATIENT MESSAGE (OUTPATIENT)
Dept: HEMATOLOGY/ONCOLOGY | Facility: CLINIC | Age: 28
End: 2023-06-29
Payer: COMMERCIAL

## 2023-06-30 NOTE — TELEPHONE ENCOUNTER
Spoke to pt   Denies vomiting today   Still only drinking water today, small bottle of gatorade sipped on today, and small fruit bites, no appetite   First few days after treatment is the worse   Phenergan not effective   Today much better than yesterday   Denies fever

## 2023-07-03 ENCOUNTER — INFUSION (OUTPATIENT)
Dept: INFUSION THERAPY | Facility: HOSPITAL | Age: 28
End: 2023-07-03
Payer: COMMERCIAL

## 2023-07-03 ENCOUNTER — LAB VISIT (OUTPATIENT)
Dept: LAB | Facility: HOSPITAL | Age: 28
End: 2023-07-03
Attending: INTERNAL MEDICINE
Payer: COMMERCIAL

## 2023-07-03 VITALS
RESPIRATION RATE: 16 BRPM | OXYGEN SATURATION: 99 % | BODY MASS INDEX: 27.77 KG/M2 | TEMPERATURE: 99 F | DIASTOLIC BLOOD PRESSURE: 81 MMHG | SYSTOLIC BLOOD PRESSURE: 130 MMHG | HEART RATE: 77 BPM | HEIGHT: 70 IN | WEIGHT: 194 LBS

## 2023-07-03 DIAGNOSIS — C62.11 MALIGNANT NEOPLASM OF DESCENDED RIGHT TESTIS: Primary | ICD-10-CM

## 2023-07-03 DIAGNOSIS — C62.11 MALIGNANT NEOPLASM OF DESCENDED RIGHT TESTIS: ICD-10-CM

## 2023-07-03 LAB
ALBUMIN SERPL BCP-MCNC: 3.7 G/DL (ref 3.5–5.2)
ALP SERPL-CCNC: 53 U/L (ref 55–135)
ALT SERPL W/O P-5'-P-CCNC: 15 U/L (ref 10–44)
ANION GAP SERPL CALC-SCNC: 6 MMOL/L (ref 8–16)
AST SERPL-CCNC: 15 U/L (ref 10–40)
BASOPHILS # BLD AUTO: 0.02 K/UL (ref 0–0.2)
BASOPHILS NFR BLD: 0.7 % (ref 0–1.9)
BILIRUB SERPL-MCNC: 0.3 MG/DL (ref 0.1–1)
BUN SERPL-MCNC: 9 MG/DL (ref 6–20)
CALCIUM SERPL-MCNC: 9.1 MG/DL (ref 8.7–10.5)
CHLORIDE SERPL-SCNC: 103 MMOL/L (ref 95–110)
CO2 SERPL-SCNC: 27 MMOL/L (ref 23–29)
CREAT SERPL-MCNC: 1 MG/DL (ref 0.5–1.4)
DIFFERENTIAL METHOD: ABNORMAL
EOSINOPHIL # BLD AUTO: 0 K/UL (ref 0–0.5)
EOSINOPHIL NFR BLD: 0.4 % (ref 0–8)
ERYTHROCYTE [DISTWIDTH] IN BLOOD BY AUTOMATED COUNT: 14.4 % (ref 11.5–14.5)
EST. GFR  (NO RACE VARIABLE): >60 ML/MIN/1.73 M^2
GLUCOSE SERPL-MCNC: 92 MG/DL (ref 70–110)
HCT VFR BLD AUTO: 36.1 % (ref 40–54)
HGB BLD-MCNC: 12 G/DL (ref 14–18)
IMM GRANULOCYTES # BLD AUTO: 0.01 K/UL (ref 0–0.04)
IMM GRANULOCYTES NFR BLD AUTO: 0.4 % (ref 0–0.5)
LYMPHOCYTES # BLD AUTO: 1.5 K/UL (ref 1–4.8)
LYMPHOCYTES NFR BLD: 52.3 % (ref 18–48)
MCH RBC QN AUTO: 27.5 PG (ref 27–31)
MCHC RBC AUTO-ENTMCNC: 33.2 G/DL (ref 32–36)
MCV RBC AUTO: 83 FL (ref 82–98)
MONOCYTES # BLD AUTO: 0.2 K/UL (ref 0.3–1)
MONOCYTES NFR BLD: 7.1 % (ref 4–15)
NEUTROPHILS # BLD AUTO: 1.1 K/UL (ref 1.8–7.7)
NEUTROPHILS NFR BLD: 39.1 % (ref 38–73)
NRBC BLD-RTO: 0 /100 WBC
PLATELET # BLD AUTO: 151 K/UL (ref 150–450)
PMV BLD AUTO: 10 FL (ref 9.2–12.9)
POTASSIUM SERPL-SCNC: 3.5 MMOL/L (ref 3.5–5.1)
PROT SERPL-MCNC: 6.4 G/DL (ref 6–8.4)
RBC # BLD AUTO: 4.36 M/UL (ref 4.6–6.2)
SODIUM SERPL-SCNC: 136 MMOL/L (ref 136–145)
WBC # BLD AUTO: 2.83 K/UL (ref 3.9–12.7)

## 2023-07-03 PROCEDURE — 36415 COLL VENOUS BLD VENIPUNCTURE: CPT | Performed by: INTERNAL MEDICINE

## 2023-07-03 PROCEDURE — 85025 COMPLETE CBC W/AUTO DIFF WBC: CPT | Performed by: INTERNAL MEDICINE

## 2023-07-03 PROCEDURE — 80053 COMPREHEN METABOLIC PANEL: CPT | Performed by: INTERNAL MEDICINE

## 2023-07-03 PROCEDURE — 63600175 PHARM REV CODE 636 W HCPCS: Performed by: NURSE PRACTITIONER

## 2023-07-03 PROCEDURE — 96413 CHEMO IV INFUSION 1 HR: CPT

## 2023-07-03 PROCEDURE — 25000003 PHARM REV CODE 250: Performed by: NURSE PRACTITIONER

## 2023-07-03 RX ORDER — HEPARIN 100 UNIT/ML
500 SYRINGE INTRAVENOUS
Status: DISCONTINUED | OUTPATIENT
Start: 2023-07-03 | End: 2023-07-03 | Stop reason: HOSPADM

## 2023-07-03 RX ORDER — SODIUM CHLORIDE 0.9 % (FLUSH) 0.9 %
10 SYRINGE (ML) INJECTION
Status: DISCONTINUED | OUTPATIENT
Start: 2023-07-03 | End: 2023-07-03 | Stop reason: HOSPADM

## 2023-07-03 RX ADMIN — BLEOMYCIN SULFATE 30 UNITS: 30 POWDER, FOR SOLUTION INTRAMUSCULAR; INTRAPLEURAL; INTRAVENOUS; SUBCUTANEOUS at 02:07

## 2023-07-03 RX ADMIN — SODIUM CHLORIDE: 9 INJECTION, SOLUTION INTRAVENOUS at 01:07

## 2023-07-03 NOTE — PLAN OF CARE
Pt ambulatory to clinic with sig other for Bleocin treatment. Denies any complaints. Port accessed without difficulty. Pt tolerated treatment well. Port deaccessed after flushing. Ambulatory from clinic in [facility].

## 2023-07-10 ENCOUNTER — LAB VISIT (OUTPATIENT)
Dept: LAB | Facility: HOSPITAL | Age: 28
End: 2023-07-10
Attending: INTERNAL MEDICINE
Payer: COMMERCIAL

## 2023-07-10 ENCOUNTER — OFFICE VISIT (OUTPATIENT)
Dept: HEMATOLOGY/ONCOLOGY | Facility: CLINIC | Age: 28
End: 2023-07-10
Payer: COMMERCIAL

## 2023-07-10 ENCOUNTER — INFUSION (OUTPATIENT)
Dept: INFUSION THERAPY | Facility: HOSPITAL | Age: 28
End: 2023-07-10
Payer: COMMERCIAL

## 2023-07-10 VITALS
HEIGHT: 70 IN | SYSTOLIC BLOOD PRESSURE: 134 MMHG | HEART RATE: 81 BPM | RESPIRATION RATE: 18 BRPM | OXYGEN SATURATION: 100 % | TEMPERATURE: 98 F | BODY MASS INDEX: 28.5 KG/M2 | WEIGHT: 199.06 LBS | DIASTOLIC BLOOD PRESSURE: 84 MMHG

## 2023-07-10 VITALS — SYSTOLIC BLOOD PRESSURE: 134 MMHG | DIASTOLIC BLOOD PRESSURE: 81 MMHG | HEART RATE: 86 BPM | RESPIRATION RATE: 18 BRPM

## 2023-07-10 DIAGNOSIS — T45.1X5A CHEMOTHERAPY INDUCED NEUTROPENIA: ICD-10-CM

## 2023-07-10 DIAGNOSIS — Z79.899 IMMUNODEFICIENCY SECONDARY TO CHEMOTHERAPY: ICD-10-CM

## 2023-07-10 DIAGNOSIS — D84.821 IMMUNODEFICIENCY SECONDARY TO CHEMOTHERAPY: ICD-10-CM

## 2023-07-10 DIAGNOSIS — C62.11 MALIGNANT NEOPLASM OF DESCENDED RIGHT TESTIS: Primary | ICD-10-CM

## 2023-07-10 DIAGNOSIS — T45.1X5A ANEMIA DUE TO CHEMOTHERAPY: ICD-10-CM

## 2023-07-10 DIAGNOSIS — R11.2 CHEMOTHERAPY INDUCED NAUSEA AND VOMITING: ICD-10-CM

## 2023-07-10 DIAGNOSIS — D70.1 CHEMOTHERAPY INDUCED NEUTROPENIA: ICD-10-CM

## 2023-07-10 DIAGNOSIS — C62.11 MALIGNANT NEOPLASM OF DESCENDED RIGHT TESTIS: ICD-10-CM

## 2023-07-10 DIAGNOSIS — T45.1X5A IMMUNODEFICIENCY SECONDARY TO CHEMOTHERAPY: ICD-10-CM

## 2023-07-10 DIAGNOSIS — T45.1X5A CHEMOTHERAPY INDUCED NAUSEA AND VOMITING: ICD-10-CM

## 2023-07-10 DIAGNOSIS — D64.81 ANEMIA DUE TO CHEMOTHERAPY: ICD-10-CM

## 2023-07-10 LAB
ALBUMIN SERPL BCP-MCNC: 3.6 G/DL (ref 3.5–5.2)
ALP SERPL-CCNC: 52 U/L (ref 55–135)
ALT SERPL W/O P-5'-P-CCNC: 15 U/L (ref 10–44)
ANION GAP SERPL CALC-SCNC: 7 MMOL/L (ref 8–16)
ANISOCYTOSIS BLD QL SMEAR: SLIGHT
AST SERPL-CCNC: 15 U/L (ref 10–40)
BASOPHILS NFR BLD: 1 % (ref 0–1.9)
BILIRUB SERPL-MCNC: 0.1 MG/DL (ref 0.1–1)
BUN SERPL-MCNC: 10 MG/DL (ref 6–20)
CALCIUM SERPL-MCNC: 9 MG/DL (ref 8.7–10.5)
CHLORIDE SERPL-SCNC: 105 MMOL/L (ref 95–110)
CO2 SERPL-SCNC: 27 MMOL/L (ref 23–29)
CREAT SERPL-MCNC: 1 MG/DL (ref 0.5–1.4)
DIFFERENTIAL METHOD: ABNORMAL
EOSINOPHIL NFR BLD: 0 % (ref 0–8)
ERYTHROCYTE [DISTWIDTH] IN BLOOD BY AUTOMATED COUNT: 15.6 % (ref 11.5–14.5)
EST. GFR  (NO RACE VARIABLE): >60 ML/MIN/1.73 M^2
GLUCOSE SERPL-MCNC: 116 MG/DL (ref 70–110)
HCT VFR BLD AUTO: 37.6 % (ref 40–54)
HGB BLD-MCNC: 12.2 G/DL (ref 14–18)
IMM GRANULOCYTES # BLD AUTO: ABNORMAL K/UL (ref 0–0.04)
IMM GRANULOCYTES NFR BLD AUTO: ABNORMAL % (ref 0–0.5)
LYMPHOCYTES NFR BLD: 53 % (ref 18–48)
MCH RBC QN AUTO: 27.6 PG (ref 27–31)
MCHC RBC AUTO-ENTMCNC: 32.4 G/DL (ref 32–36)
MCV RBC AUTO: 85 FL (ref 82–98)
METAMYELOCYTES NFR BLD MANUAL: 11 %
MONOCYTES NFR BLD: 17 % (ref 4–15)
MYELOCYTES NFR BLD MANUAL: 5 %
NEUTROPHILS NFR BLD: 13 % (ref 38–73)
NRBC BLD-RTO: 1 /100 WBC
PLATELET # BLD AUTO: 333 K/UL (ref 150–450)
PLATELET BLD QL SMEAR: ABNORMAL
PMV BLD AUTO: 9.8 FL (ref 9.2–12.9)
POIKILOCYTOSIS BLD QL SMEAR: SLIGHT
POTASSIUM SERPL-SCNC: 4.2 MMOL/L (ref 3.5–5.1)
PROT SERPL-MCNC: 6.8 G/DL (ref 6–8.4)
RBC # BLD AUTO: 4.42 M/UL (ref 4.6–6.2)
SODIUM SERPL-SCNC: 139 MMOL/L (ref 136–145)
WBC # BLD AUTO: 4.42 K/UL (ref 3.9–12.7)

## 2023-07-10 PROCEDURE — 99999 PR PBB SHADOW E&M-EST. PATIENT-LVL IV: CPT | Mod: PBBFAC,,, | Performed by: REGISTERED NURSE

## 2023-07-10 PROCEDURE — 99999 PR PBB SHADOW E&M-EST. PATIENT-LVL IV: ICD-10-PCS | Mod: PBBFAC,,, | Performed by: REGISTERED NURSE

## 2023-07-10 PROCEDURE — 25000003 PHARM REV CODE 250: Performed by: REGISTERED NURSE

## 2023-07-10 PROCEDURE — 3075F PR MOST RECENT SYSTOLIC BLOOD PRESS GE 130-139MM HG: ICD-10-PCS | Mod: CPTII,S$GLB,, | Performed by: REGISTERED NURSE

## 2023-07-10 PROCEDURE — 96375 TX/PRO/DX INJ NEW DRUG ADDON: CPT

## 2023-07-10 PROCEDURE — 3075F SYST BP GE 130 - 139MM HG: CPT | Mod: CPTII,S$GLB,, | Performed by: REGISTERED NURSE

## 2023-07-10 PROCEDURE — 85060 PATHOLOGIST REVIEW: ICD-10-PCS | Mod: ,,, | Performed by: PATHOLOGY

## 2023-07-10 PROCEDURE — 36415 COLL VENOUS BLD VENIPUNCTURE: CPT | Performed by: INTERNAL MEDICINE

## 2023-07-10 PROCEDURE — 3079F DIAST BP 80-89 MM HG: CPT | Mod: CPTII,S$GLB,, | Performed by: REGISTERED NURSE

## 2023-07-10 PROCEDURE — 99215 OFFICE O/P EST HI 40 MIN: CPT | Mod: S$GLB,,, | Performed by: REGISTERED NURSE

## 2023-07-10 PROCEDURE — 99215 PR OFFICE/OUTPT VISIT, EST, LEVL V, 40-54 MIN: ICD-10-PCS | Mod: S$GLB,,, | Performed by: REGISTERED NURSE

## 2023-07-10 PROCEDURE — 96413 CHEMO IV INFUSION 1 HR: CPT

## 2023-07-10 PROCEDURE — 85007 BL SMEAR W/DIFF WBC COUNT: CPT | Performed by: INTERNAL MEDICINE

## 2023-07-10 PROCEDURE — A4216 STERILE WATER/SALINE, 10 ML: HCPCS | Performed by: REGISTERED NURSE

## 2023-07-10 PROCEDURE — 3008F BODY MASS INDEX DOCD: CPT | Mod: CPTII,S$GLB,, | Performed by: REGISTERED NURSE

## 2023-07-10 PROCEDURE — 3044F PR MOST RECENT HEMOGLOBIN A1C LEVEL <7.0%: ICD-10-PCS | Mod: CPTII,S$GLB,, | Performed by: REGISTERED NURSE

## 2023-07-10 PROCEDURE — 3008F PR BODY MASS INDEX (BMI) DOCUMENTED: ICD-10-PCS | Mod: CPTII,S$GLB,, | Performed by: REGISTERED NURSE

## 2023-07-10 PROCEDURE — 80053 COMPREHEN METABOLIC PANEL: CPT | Performed by: INTERNAL MEDICINE

## 2023-07-10 PROCEDURE — 3044F HG A1C LEVEL LT 7.0%: CPT | Mod: CPTII,S$GLB,, | Performed by: REGISTERED NURSE

## 2023-07-10 PROCEDURE — 85060 BLOOD SMEAR INTERPRETATION: CPT | Mod: ,,, | Performed by: PATHOLOGY

## 2023-07-10 PROCEDURE — 85027 COMPLETE CBC AUTOMATED: CPT | Performed by: INTERNAL MEDICINE

## 2023-07-10 PROCEDURE — 96417 CHEMO IV INFUS EACH ADDL SEQ: CPT

## 2023-07-10 PROCEDURE — 63600175 PHARM REV CODE 636 W HCPCS: Performed by: REGISTERED NURSE

## 2023-07-10 PROCEDURE — 96367 TX/PROPH/DG ADDL SEQ IV INF: CPT

## 2023-07-10 PROCEDURE — 3079F PR MOST RECENT DIASTOLIC BLOOD PRESSURE 80-89 MM HG: ICD-10-PCS | Mod: CPTII,S$GLB,, | Performed by: REGISTERED NURSE

## 2023-07-10 RX ORDER — PROCHLORPERAZINE EDISYLATE 5 MG/ML
10 INJECTION INTRAMUSCULAR; INTRAVENOUS
Status: CANCELLED
Start: 2023-07-11

## 2023-07-10 RX ORDER — SODIUM CHLORIDE 0.9 % (FLUSH) 0.9 %
10 SYRINGE (ML) INJECTION
Status: CANCELLED | OUTPATIENT
Start: 2023-07-17

## 2023-07-10 RX ORDER — HEPARIN 100 UNIT/ML
500 SYRINGE INTRAVENOUS
Status: CANCELLED | OUTPATIENT
Start: 2023-07-13

## 2023-07-10 RX ORDER — SODIUM CHLORIDE 0.9 % (FLUSH) 0.9 %
10 SYRINGE (ML) INJECTION
Status: CANCELLED | OUTPATIENT
Start: 2023-07-24

## 2023-07-10 RX ORDER — PROCHLORPERAZINE EDISYLATE 5 MG/ML
10 INJECTION INTRAMUSCULAR; INTRAVENOUS
Status: CANCELLED
Start: 2023-07-12

## 2023-07-10 RX ORDER — SODIUM CHLORIDE 0.9 % (FLUSH) 0.9 %
10 SYRINGE (ML) INJECTION
Status: CANCELLED | OUTPATIENT
Start: 2023-07-10

## 2023-07-10 RX ORDER — SODIUM CHLORIDE 0.9 % (FLUSH) 0.9 %
10 SYRINGE (ML) INJECTION
Status: CANCELLED | OUTPATIENT
Start: 2023-07-14

## 2023-07-10 RX ORDER — HEPARIN 100 UNIT/ML
500 SYRINGE INTRAVENOUS
Status: CANCELLED | OUTPATIENT
Start: 2023-07-17

## 2023-07-10 RX ORDER — HEPARIN 100 UNIT/ML
500 SYRINGE INTRAVENOUS
Status: CANCELLED | OUTPATIENT
Start: 2023-07-12

## 2023-07-10 RX ORDER — SODIUM CHLORIDE 0.9 % (FLUSH) 0.9 %
10 SYRINGE (ML) INJECTION
Status: CANCELLED | OUTPATIENT
Start: 2023-07-12

## 2023-07-10 RX ORDER — HEPARIN 100 UNIT/ML
500 SYRINGE INTRAVENOUS
Status: CANCELLED | OUTPATIENT
Start: 2023-07-11

## 2023-07-10 RX ORDER — HEPARIN 100 UNIT/ML
500 SYRINGE INTRAVENOUS
Status: CANCELLED | OUTPATIENT
Start: 2023-07-24

## 2023-07-10 RX ORDER — PROCHLORPERAZINE EDISYLATE 5 MG/ML
10 INJECTION INTRAMUSCULAR; INTRAVENOUS
Status: CANCELLED
Start: 2023-07-14

## 2023-07-10 RX ORDER — SODIUM CHLORIDE 0.9 % (FLUSH) 0.9 %
10 SYRINGE (ML) INJECTION
Status: CANCELLED | OUTPATIENT
Start: 2023-07-11

## 2023-07-10 RX ORDER — SODIUM CHLORIDE 0.9 % (FLUSH) 0.9 %
10 SYRINGE (ML) INJECTION
Status: CANCELLED | OUTPATIENT
Start: 2023-07-13

## 2023-07-10 RX ORDER — HEPARIN 100 UNIT/ML
500 SYRINGE INTRAVENOUS
Status: CANCELLED | OUTPATIENT
Start: 2023-07-14

## 2023-07-10 RX ORDER — HEPARIN 100 UNIT/ML
500 SYRINGE INTRAVENOUS
Status: CANCELLED | OUTPATIENT
Start: 2023-07-10

## 2023-07-10 RX ORDER — HEPARIN 100 UNIT/ML
500 SYRINGE INTRAVENOUS
Status: DISCONTINUED | OUTPATIENT
Start: 2023-07-10 | End: 2023-07-10 | Stop reason: HOSPADM

## 2023-07-10 RX ORDER — SODIUM CHLORIDE 0.9 % (FLUSH) 0.9 %
10 SYRINGE (ML) INJECTION
Status: DISCONTINUED | OUTPATIENT
Start: 2023-07-10 | End: 2023-07-10 | Stop reason: HOSPADM

## 2023-07-10 RX ADMIN — Medication 10 ML: at 01:07

## 2023-07-10 RX ADMIN — SODIUM CHLORIDE 500 ML: 9 INJECTION, SOLUTION INTRAVENOUS at 11:07

## 2023-07-10 RX ADMIN — SODIUM CHLORIDE 500 ML: 9 INJECTION, SOLUTION INTRAVENOUS at 08:07

## 2023-07-10 RX ADMIN — BLEOMYCIN SULFATE 30 UNITS: 30 POWDER, FOR SOLUTION INTRAMUSCULAR; INTRAPLEURAL; INTRAVENOUS; SUBCUTANEOUS at 12:07

## 2023-07-10 RX ADMIN — DEXAMETHASONE SODIUM PHOSPHATE 0.25 MG: 4 INJECTION, SOLUTION INTRA-ARTICULAR; INTRALESIONAL; INTRAMUSCULAR; INTRAVENOUS; SOFT TISSUE at 09:07

## 2023-07-10 RX ADMIN — CISPLATIN 43 MG: 1 INJECTION INTRAVENOUS at 10:07

## 2023-07-10 RX ADMIN — HEPARIN 500 UNITS: 100 SYRINGE at 01:07

## 2023-07-10 RX ADMIN — APREPITANT 130 MG: 130 INJECTION, EMULSION INTRAVENOUS at 09:07

## 2023-07-10 RX ADMIN — ETOPOSIDE 216 MG: 20 INJECTION INTRAVENOUS at 11:07

## 2023-07-10 NOTE — PROGRESS NOTES
Subjective     Patient ID: Arturo Salguero III is a 27 y.o. male.    Chief Complaint: Malignant neoplasm of descended right testis    HPI    Returns for follow up and to start C3 BEP.   Typically weekend following week 1 and early part of week 2 is the hardest for him  Notes increased nausea and vomiting  Does not feel medications were helpful   Gum irritation has healed, has not recurred at this time.   Feeling much better today.   Weight increased since last visit.   Appetite initially low but improved.   Energy level stable.   No issues with urination   No cough, hearing loss, or tinnitus.   Ready to complete cycle 3.   Denies fever/chills, SOB, CP, palpitations, current N/V, C/D, pain, blood in urine/stool, paresthesias.     - 6/1/2023 MRI Abdomen:  FINDINGS:  Inferior Thorax: Bilateral gynecomastia.  Liver: Normal size and background parenchymal signal.  No suspicious lesion.  Hepatic and portal veins are patent.  Gallbladder: Unremarkable.  Bile Ducts: No dilatation.  Pancreas: No mass or ductal dilatation.  Spleen: Unremarkable.  Adrenals: Unremarkable.  Kidneys/Ureters: No mass or hydroureteronephrosis.  GI Tract/Mesentery: No evidence of bowel obstruction or inflammation.  Peritoneal Space: No ascites.  Retroperitoneum: Few prominent distal retroperitoneal lymph nodes, similar to prior PET-CT.  Distal aortocaval lymph node measures 1.3 cm (series [id number], image 75).  Abdominal wall: Unremarkable.  Vasculature: No aneurysm.  Bones: No suspicious marrow replacing lesion.  Impression:  1. No suspicious hepatic lesion.  2. Few prominent distal retroperitoneal lymph nodes, similar to prior PET-CT.  3. Additional findings as above.      6/14/2023 MRI T spine:  FINDINGS:  Alignment: Normal.  Vertebrae: Normal marrow signal. No fracture.  No enhancing osseous lesions.  Discs: Disc heights are maintained.  No evidence for discitis.  Cord: Normal morphology and signal.  Enhancement: No abnormal enhancement.  Degenerative  findings: No spinal canal stenosis or neural foraminal narrowing at any level.  Paraspinal muscles & soft tissues: Unremarkable.  Impression:  1. Normal examination.  No evidence for metastatic disease.  Electronically signed by resident: Holden Adame  Date:                                            06/14/2023  Time:                                           16:23  Electronically signed by: Babak Cadena MD  Date:                                            06/14/2023  Time:                                           17:04    Oncology History:  - noted right testicular swelling and went to his PCP     - 3/20/2023 Testicular Ultrasound:  FINDINGS:  Right Testicle:  *Size: 7.5 x 5.3 x 5.8 cm  *Appearance: Markedly heterogeneous echotexture.  *Flow: Normal arterial and venous flow  *Epididymis: Normal.  *Hydrocele: None.  *Varicocele: None.     Left Testicle:  *Size: 7.7 x 5.2 x 5.6 cm  *Appearance: Markedly heterogeneous echotexture.  *Flow: Normal arterial and venous flow  *Epididymis: Normal.  *Hydrocele: None.  *Varicocele: None.     Other findings: None.  Impression:  Bilateral testicular enlargement with markedly heterogeneous echotexture, concerning for infiltrative process such as lymphoma.  Correlation with prior history is recommended as sequela from bilateral remote orchitis may give a similar appearance.  Consultation with urology is recommended.  This report was flagged in Epic as abnormal.     - 3/21/2023 CT C/A/P:  FINDINGS:  BASE OF NECK: No significant abnormality.  Thyroid gland unremarkable.  THORACIC SOFT TISSUES: No axillary adenopathy.  AORTA: Thoracic aorta maintains normal caliber.  Left-sided aortic arch with normal three vessel branching pattern.  No calcific atherosclerosis of the thoracic aorta.  HEART: Normal size with physiologic pericardial fluid.  PULMONARY VASCULATURE: Unremarkable.  EMILY/MEDIASTINUM: No pathologic alexandra enlargement.  AIRWAYS: Trachea and proximal airways remain  patent.  LUNGS/PLEURA: Lungs are symmetrically well expanded.  No focal consolidation, mass, pneumothorax, or pleural fluid.  LIVER: Normal in size and contour.  No focal hepatic lesion.  GALLBLADDER: No calcified gallstones.  BILE DUCTS: No evidence of dilated ducts.  PANCREAS: No mass or peripancreatic fat stranding.  SPLEEN: No splenomegaly.  ADRENALS: Unremarkable.  KIDNEYS/URETERS: Normal in size and location with normal enhancement.  No hydronephrosis or nephrolithiasis. No ureteral dilatation.  BLADDER: No evidence of wall thickening.  REPRODUCTIVE ORGANS: Heterogeneous 6.5 x 6.2 cm mass-like enlargement of right testicle, correlate with 03/20/2023 ultrasound.  STOMACH/DUODENUM: Unremarkable.  BOWEL/MESENTERY: Small bowel is normal in caliber with no evidence of obstruction. No evidence of inflammation or wall thickening.  Colon demonstrates no focal wall thickening.  Appendix is visualized and appears normal.  PERITONEUM: No intraperitoneal free air or fluid.  LYMPH NODES: Enlarged 1.1 cm pericaval node (axial 3:99).  No pelvic or inguinal lymphadenopathy.  VASCULATURE: No significant calcific atherosclerosis.  Portal venous system is patent.  ABDOMINAL WALL:  Small fat containing umbilical hernia.  BONES: No acute fracture. No suspicious osseous lesions.  Impression:  1. Enlarged 1.1 cm pericaval lymph node.  No additional enlarged lymph nodes throughout the abdomen.  No pelvic or inguinal lymphadenopathy.  2. Heterogeneous 6.5 x 6.2 cm mass-like enlargement of right testicle, correlate with 03/20/2023 ultrasound.  3. Other findings above.  This report was flagged in Epic as abnormal.     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   - See [facility] synoptic report below.   CASE SUMMARY: (TESTIS: Radical Orchiectomy)   Standard(s): AJCC-UICC 8   SPECIMEN   Specimen Laterality: Right.   Tumor Focality: Unifocal.   Tumor Size:  Greatest dimension of main tumor mass in Centimeters (cm): 6.7 cm.   Histologic Type: Mixed germ cell tumor.   Embryonal carcinoma (specify percentage): 50 %   Yolk sac tumor, postpubertal type (specify percentage): 40 %   Choriocarcinoma (specify percentage): 10 %   Tumor Extent: Invades rete testis.   Lymphovascular Invasion: Present.   Margin Status: All margins negative for tumor.   Regional Lymph Node Status: Not applicable (no regional lymph nodes submitted or found).   Distant Site: Not applicable.   PATHOLOGIC STAGE CLASSIFICATION (pTNM, AJCC 8th Edition): pT2 pN not assigned (no nodes submitted or found).   Blocks for potential molecular or ancillary studies:   Tumor block: 1I or 1L.      PMH:  None     FH:  Mother living at age 53 yo, healthy overall  Father, living at age 53 yo, healthy  1 brother- Type 1 DM- 25 yo  1 sister- healthy  No major health issues known extended family     SH:  Apartment work, maintenance A/C  Good support system  No EtOH (Rare social)  No tobacco     Review of Systems  No changes    Review of Systems   Constitutional:  Negative for activity change, appetite change, chills, fatigue, fever and unexpected weight change.        See Above   HENT:  Negative for dental problem, hearing loss, mouth sores, postnasal drip, rhinorrhea, sinus pressure/congestion, sore throat, tinnitus, trouble swallowing and voice change.    Eyes:  Negative for visual disturbance.   Respiratory:  Negative for cough, shortness of breath and wheezing.    Cardiovascular:  Negative for chest pain, palpitations and leg swelling.   Gastrointestinal:  Positive for nausea. Negative for abdominal distention, abdominal pain, blood in stool, constipation, diarrhea and vomiting.   Endocrine: Negative for cold intolerance and heat intolerance.   Genitourinary:  Negative for bladder incontinence, decreased urine volume, difficulty urinating, dysuria, frequency, hematuria and urgency.   Musculoskeletal:  Negative for  arthralgias, back pain, gait problem, joint swelling, myalgias, neck pain and neck stiffness.   Integumentary:  Negative for color change, pallor, rash and wound.   Neurological:  Negative for dizziness, facial asymmetry, weakness, light-headedness, numbness, headaches, coordination difficulties and coordination difficulties.   Hematological:  Negative for adenopathy. Does not bruise/bleed easily.   Psychiatric/Behavioral:  Negative for agitation, behavioral problems, confusion, decreased concentration, dysphoric mood and sleep disturbance. The patient is not nervous/anxious.    All other systems reviewed and are negative.     Objective     Physical Exam  Vitals and nursing note reviewed.   Constitutional:       General: He is not in acute distress.     Appearance: Normal appearance. He is well-developed and normal weight. He is not ill-appearing, toxic-appearing or diaphoretic.      Comments: Very pleasant   ECOG = 0  Presents with his mother   HENT:      Head: Normocephalic and atraumatic.      Right Ear: External ear normal.      Left Ear: External ear normal.      Nose: Nose normal. No congestion.      Mouth/Throat:      Pharynx: Oropharynx is clear. No oropharyngeal exudate.   Eyes:      General: No scleral icterus.     Extraocular Movements: Extraocular movements intact.      Conjunctiva/sclera: Conjunctivae normal.      Pupils: Pupils are equal, round, and reactive to light.   Neck:      Thyroid: No thyromegaly.      Trachea: No tracheal deviation.   Cardiovascular:      Rate and Rhythm: Normal rate and regular rhythm.      Heart sounds: Normal heart sounds. No murmur heard.    No friction rub. No gallop.   Pulmonary:      Effort: Pulmonary effort is normal. No respiratory distress.      Breath sounds: Normal breath sounds. No wheezing, rhonchi or rales.   Chest:      Chest wall: No tenderness.   Abdominal:      General: Bowel sounds are normal. There is no distension.      Palpations: Abdomen is soft. There  is no mass.      Tenderness: There is no abdominal tenderness. There is no guarding or rebound.   Musculoskeletal:         General: No swelling or tenderness. Normal range of motion.      Cervical back: Normal range of motion and neck supple.      Right lower leg: No edema.      Left lower leg: No edema.   Lymphadenopathy:      Cervical: No cervical adenopathy.   Skin:     General: Skin is warm and dry.      Coloration: Skin is not jaundiced or pale.      Findings: No erythema, lesion or rash.      Comments: Faint mottled type pattern to lower back without changes   Neurological:      General: No focal deficit present.      Mental Status: He is alert and oriented to person, place, and time. Mental status is at baseline.      Cranial Nerves: No cranial nerve deficit.      Sensory: No sensory deficit.      Motor: No weakness or abnormal muscle tone.      Coordination: Coordination normal.      Gait: Gait normal.      Deep Tendon Reflexes: Reflexes are normal and symmetric. Reflexes normal.   Psychiatric:         Mood and Affect: Mood normal.         Behavior: Behavior normal.         Thought Content: Thought content normal.         Judgment: Judgment normal.     Labs- reviewed     Assessment and Plan     1. 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 Anemia due to chemotherapy        4. Chemotherapy induced nausea and vomiting        5. Chemotherapy induced neutropenia          Testicular cancer-  Completed 2 cycles of BEP thus far; needs 3 cycles total  Ok to proceed with C3D1.   Appointments scheduled appropriately.   Will recheck labs 4-6 weeks after completion of chemo.     CBC differential pending today.   Will review prior to chemo.     Anemia-  Parameters stable.   Continue to monitor.   No signs of bleeding, platelets normal.      N/V -   Reviewed medications and wrote out plan previously  Will send message with instructions again.   Rx Zyprexa 5mg at night x 7 days.   Continue zofran and dex q12 hours starting day 2; continue until 2 days after day 5 infusion.       Addendum:   . Will not delay treatment.   Add Neupogen for days 9-11; will have this scheduled appropriately.   Continue neutropenic precautions.     Patient is in agreement with the proposed treatment plan. All questions were answered to the patient's satisfaction. Pt knows to call clinic if anything is needed before the next clinic visit.    Patient discussed with collaborating physician, Dr. Alston.    At least 40 minutes were spent today on this encounter including face to face time with the patient, data gathering/interpretation and documentation.       Heaven Yepez, MSN, APRN, [facility]  Hematology and Medical Oncology  Clinical Nurse Specialist to Dr. Carter, Dr. Alston & Dr. Slater    Route Chart for Scheduling    Med Onc Chart Routing      Follow up with physician Other. rtc in 4-6 weeks with labs (cbc cmp ldh afp bhcg) to see dr. alston in clinic for follow up   Follow up with GREGORY    Infusion scheduling note    Injection scheduling note    Labs CBC, CMP, B HCG, LDH and other   Scheduling:  Preferred lab:  Lab interval:  +afp   Imaging    Pharmacy appointment    Other referrals        Treatment Plan Information   OP TESTICULAR BEP 5-DAY Q3W   Zenobia Alston MD   Upcoming Treatment Dates - OP TESTICULAR BEP 5-DAY Q3W    7/10/2023       Pre-Medications       aprepitant (CINVANTI) injection 130 mg       palonosetron 0.25mg/dexAMETHasone 10mg in NS IVPB 0.25 mg 50 mL       Chemotherapy       CISplatin (Platinol) 20 mg/m2 = 43 mg in sodium chloride 0.9% 293 mL chemo infusion       etoposide (VEPESID) 100 mg/m2 = 216 mg in sodium chloride 0.9% 510.8 mL chemo infusion       bleomycin (BLEOCIN) 30 Units in sodium chloride 0.9% 110 mL chemo infusion        Pre-Hydration       sodium chloride 0.9% bolus 500 mL 500 mL       Post-Hydration       sodium chloride 0.9% bolus 500 mL 500 mL  7/11/2023       Pre-Medications       dexAMETHasone (DECADRON) 10 mg in sodium chloride 0.9% 50 mL IVPB       prochlorperazine injection Soln 10 mg       Chemotherapy       CISplatin (Platinol) 20 mg/m2 = 43 mg in sodium chloride 0.9% 543 mL chemo infusion       etoposide (VEPESID) 100 mg/m2 = 216 mg in sodium chloride 0.9% 510.8 mL chemo infusion       Pre-Hydration       sodium chloride 0.9% bolus 500 mL 500 mL       Post-Hydration       sodium chloride 0.9% bolus 500 mL 500 mL  7/12/2023       Pre-Medications       dexAMETHasone (DECADRON) 10 mg in sodium chloride 0.9% 50 mL IVPB       prochlorperazine injection Soln 10 mg       Chemotherapy       CISplatin (Platinol) 20 mg/m2 = 43 mg in sodium chloride 0.9% 543 mL chemo infusion       etoposide (VEPESID) 100 mg/m2 = 216 mg in sodium chloride 0.9% 510.8 mL chemo infusion       Pre-Hydration       sodium chloride 0.9% bolus 500 mL 500 mL       Post-Hydration       sodium chloride 0.9% bolus 500 mL 500 mL  7/13/2023       Pre-Medications       palonosetron 0.25mg/dexAMETHasone 10mg in NS IVPB 0.25 mg 50 mL       Chemotherapy       CISplatin (Platinol) 20 mg/m2 = 43 mg in sodium chloride 0.9% 543 mL chemo infusion       etoposide (VEPESID) 100 mg/m2 = 216 mg in sodium chloride 0.9% 510.8 mL chemo infusion       Pre-Hydration       sodium chloride 0.9% bolus 500 mL 500 mL       Post-Hydration       sodium chloride 0.9% bolus 500 mL 500 mL

## 2023-07-10 NOTE — PLAN OF CARE
1346 Patient tolerated C3D1 Cisplatin/ Etoposide/ Bleomycin /IVFs without incident. Seen by Heaven Yepez NP prior to treatment today. Labs reviewed and within parameters for treatment. Vitals stable before, during, and after infusion. Port with brisk blood return and flushed without resistance before, during and after infusion, heparin locked and needle removed at discharge per patient request. Pre-medicated per orders. Patient aware of his next appointment date/time (tomorrow at 0800), uses [facility]. To contact provider with questions or concerns. D/C ambulatory and stable with his girlfriend.

## 2023-07-11 ENCOUNTER — PATIENT MESSAGE (OUTPATIENT)
Dept: HEMATOLOGY/ONCOLOGY | Facility: CLINIC | Age: 28
End: 2023-07-11
Payer: COMMERCIAL

## 2023-07-11 ENCOUNTER — INFUSION (OUTPATIENT)
Dept: INFUSION THERAPY | Facility: HOSPITAL | Age: 28
End: 2023-07-11
Payer: COMMERCIAL

## 2023-07-11 VITALS
OXYGEN SATURATION: 99 % | HEART RATE: 82 BPM | RESPIRATION RATE: 18 BRPM | TEMPERATURE: 98 F | BODY MASS INDEX: 28.63 KG/M2 | WEIGHT: 199.5 LBS | SYSTOLIC BLOOD PRESSURE: 132 MMHG | DIASTOLIC BLOOD PRESSURE: 79 MMHG

## 2023-07-11 DIAGNOSIS — C62.11 MALIGNANT NEOPLASM OF DESCENDED RIGHT TESTIS: Primary | ICD-10-CM

## 2023-07-11 LAB — PATH REV BLD -IMP: NORMAL

## 2023-07-11 PROCEDURE — 96413 CHEMO IV INFUSION 1 HR: CPT

## 2023-07-11 PROCEDURE — 96367 TX/PROPH/DG ADDL SEQ IV INF: CPT

## 2023-07-11 PROCEDURE — 63600175 PHARM REV CODE 636 W HCPCS: Performed by: REGISTERED NURSE

## 2023-07-11 PROCEDURE — 96375 TX/PRO/DX INJ NEW DRUG ADDON: CPT

## 2023-07-11 PROCEDURE — A4216 STERILE WATER/SALINE, 10 ML: HCPCS | Performed by: REGISTERED NURSE

## 2023-07-11 PROCEDURE — 25000003 PHARM REV CODE 250: Performed by: REGISTERED NURSE

## 2023-07-11 PROCEDURE — 96417 CHEMO IV INFUS EACH ADDL SEQ: CPT

## 2023-07-11 RX ORDER — PROCHLORPERAZINE EDISYLATE 5 MG/ML
10 INJECTION INTRAMUSCULAR; INTRAVENOUS
Status: COMPLETED | OUTPATIENT
Start: 2023-07-11 | End: 2023-07-11

## 2023-07-11 RX ORDER — SODIUM CHLORIDE 0.9 % (FLUSH) 0.9 %
10 SYRINGE (ML) INJECTION
Status: DISCONTINUED | OUTPATIENT
Start: 2023-07-11 | End: 2023-07-11 | Stop reason: HOSPADM

## 2023-07-11 RX ORDER — HEPARIN 100 UNIT/ML
500 SYRINGE INTRAVENOUS
Status: DISCONTINUED | OUTPATIENT
Start: 2023-07-11 | End: 2023-07-11 | Stop reason: HOSPADM

## 2023-07-11 RX ADMIN — HEPARIN 500 UNITS: 100 SYRINGE at 12:07

## 2023-07-11 RX ADMIN — ETOPOSIDE 216 MG: 20 INJECTION INTRAVENOUS at 11:07

## 2023-07-11 RX ADMIN — CISPLATIN 43 MG: 1 INJECTION INTRAVENOUS at 10:07

## 2023-07-11 RX ADMIN — SODIUM CHLORIDE 500 ML: 9 INJECTION, SOLUTION INTRAVENOUS at 09:07

## 2023-07-11 RX ADMIN — DEXAMETHASONE SODIUM PHOSPHATE 10 MG: 4 INJECTION, SOLUTION INTRA-ARTICULAR; INTRALESIONAL; INTRAMUSCULAR; INTRAVENOUS; SOFT TISSUE at 09:07

## 2023-07-11 RX ADMIN — Medication 10 ML: at 12:07

## 2023-07-11 RX ADMIN — SODIUM CHLORIDE 500 ML: 9 INJECTION, SOLUTION INTRAVENOUS at 11:07

## 2023-07-11 RX ADMIN — PROCHLORPERAZINE EDISYLATE 10 MG: 5 INJECTION INTRAMUSCULAR; INTRAVENOUS at 09:07

## 2023-07-11 NOTE — PLAN OF CARE
1230 Patient tolerated cisplatin and etoposide plus IVF's with no s/s of reaction and no complaints. Port flushed and heparin locked. Bandaid to port site. He declined the AVS and understands to come back for next appt tomorrow. Ambulated out.

## 2023-07-11 NOTE — PLAN OF CARE
0915 Patient here for C3D2 cisplatin/etoposide. Labs, meds and hx reviewed. Patient was seen by MD yesterday and orders signed for treatment today. Port accessed and NS bolus started. Lydia and snack provided.

## 2023-07-12 ENCOUNTER — INFUSION (OUTPATIENT)
Dept: INFUSION THERAPY | Facility: HOSPITAL | Age: 28
End: 2023-07-12
Payer: COMMERCIAL

## 2023-07-12 VITALS
WEIGHT: 199.31 LBS | SYSTOLIC BLOOD PRESSURE: 138 MMHG | BODY MASS INDEX: 28.53 KG/M2 | RESPIRATION RATE: 18 BRPM | HEIGHT: 70 IN | HEART RATE: 68 BPM | DIASTOLIC BLOOD PRESSURE: 83 MMHG | TEMPERATURE: 98 F

## 2023-07-12 DIAGNOSIS — C62.11 MALIGNANT NEOPLASM OF DESCENDED RIGHT TESTIS: Primary | ICD-10-CM

## 2023-07-12 PROCEDURE — 96367 TX/PROPH/DG ADDL SEQ IV INF: CPT

## 2023-07-12 PROCEDURE — 96413 CHEMO IV INFUSION 1 HR: CPT

## 2023-07-12 PROCEDURE — A4216 STERILE WATER/SALINE, 10 ML: HCPCS | Performed by: REGISTERED NURSE

## 2023-07-12 PROCEDURE — 25000003 PHARM REV CODE 250: Performed by: REGISTERED NURSE

## 2023-07-12 PROCEDURE — 63600175 PHARM REV CODE 636 W HCPCS: Performed by: REGISTERED NURSE

## 2023-07-12 PROCEDURE — 96417 CHEMO IV INFUS EACH ADDL SEQ: CPT

## 2023-07-12 RX ORDER — SODIUM CHLORIDE 0.9 % (FLUSH) 0.9 %
10 SYRINGE (ML) INJECTION
Status: DISCONTINUED | OUTPATIENT
Start: 2023-07-12 | End: 2023-07-12 | Stop reason: HOSPADM

## 2023-07-12 RX ORDER — PROCHLORPERAZINE EDISYLATE 5 MG/ML
10 INJECTION INTRAMUSCULAR; INTRAVENOUS
Status: DISCONTINUED | OUTPATIENT
Start: 2023-07-12 | End: 2023-07-12 | Stop reason: HOSPADM

## 2023-07-12 RX ORDER — HEPARIN 100 UNIT/ML
500 SYRINGE INTRAVENOUS
Status: DISCONTINUED | OUTPATIENT
Start: 2023-07-12 | End: 2023-07-12 | Stop reason: HOSPADM

## 2023-07-12 RX ADMIN — Medication 10 ML: at 12:07

## 2023-07-12 RX ADMIN — ETOPOSIDE 216 MG: 20 INJECTION INTRAVENOUS at 11:07

## 2023-07-12 RX ADMIN — SODIUM CHLORIDE 500 ML: 9 INJECTION, SOLUTION INTRAVENOUS at 11:07

## 2023-07-12 RX ADMIN — CISPLATIN 43 MG: 1 INJECTION INTRAVENOUS at 10:07

## 2023-07-12 RX ADMIN — DEXAMETHASONE SODIUM PHOSPHATE 10 MG: 4 INJECTION, SOLUTION INTRA-ARTICULAR; INTRALESIONAL; INTRAMUSCULAR; INTRAVENOUS; SOFT TISSUE at 08:07

## 2023-07-12 RX ADMIN — HEPARIN 500 UNITS: 100 SYRINGE at 12:07

## 2023-07-12 RX ADMIN — SODIUM CHLORIDE 500 ML: 9 INJECTION, SOLUTION INTRAVENOUS at 08:07

## 2023-07-12 NOTE — PLAN OF CARE
Pt arrived AAOx3, VSS, pt tolerated C3D3 cis/etop without incident and was discharged in stable ambulatory condition.

## 2023-07-13 ENCOUNTER — INFUSION (OUTPATIENT)
Dept: INFUSION THERAPY | Facility: HOSPITAL | Age: 28
End: 2023-07-13
Payer: COMMERCIAL

## 2023-07-13 VITALS
WEIGHT: 199.31 LBS | SYSTOLIC BLOOD PRESSURE: 154 MMHG | HEIGHT: 70 IN | DIASTOLIC BLOOD PRESSURE: 87 MMHG | HEART RATE: 66 BPM | OXYGEN SATURATION: 99 % | TEMPERATURE: 98 F | BODY MASS INDEX: 28.53 KG/M2 | RESPIRATION RATE: 18 BRPM

## 2023-07-13 DIAGNOSIS — C62.11 MALIGNANT NEOPLASM OF DESCENDED RIGHT TESTIS: Primary | ICD-10-CM

## 2023-07-13 PROCEDURE — 96417 CHEMO IV INFUS EACH ADDL SEQ: CPT

## 2023-07-13 PROCEDURE — 96413 CHEMO IV INFUSION 1 HR: CPT

## 2023-07-13 PROCEDURE — 96367 TX/PROPH/DG ADDL SEQ IV INF: CPT

## 2023-07-13 PROCEDURE — 63600175 PHARM REV CODE 636 W HCPCS: Performed by: REGISTERED NURSE

## 2023-07-13 PROCEDURE — 25000003 PHARM REV CODE 250: Performed by: REGISTERED NURSE

## 2023-07-13 PROCEDURE — 96361 HYDRATE IV INFUSION ADD-ON: CPT

## 2023-07-13 PROCEDURE — A4216 STERILE WATER/SALINE, 10 ML: HCPCS | Performed by: REGISTERED NURSE

## 2023-07-13 RX ORDER — SODIUM CHLORIDE 0.9 % (FLUSH) 0.9 %
10 SYRINGE (ML) INJECTION
Status: DISCONTINUED | OUTPATIENT
Start: 2023-07-13 | End: 2023-07-13 | Stop reason: HOSPADM

## 2023-07-13 RX ORDER — HEPARIN 100 UNIT/ML
500 SYRINGE INTRAVENOUS
Status: DISCONTINUED | OUTPATIENT
Start: 2023-07-13 | End: 2023-07-13 | Stop reason: HOSPADM

## 2023-07-13 RX ADMIN — SODIUM CHLORIDE 500 ML: 0.9 INJECTION, SOLUTION INTRAVENOUS at 10:07

## 2023-07-13 RX ADMIN — SODIUM CHLORIDE 500 ML: 0.9 INJECTION, SOLUTION INTRAVENOUS at 08:07

## 2023-07-13 RX ADMIN — PALONOSETRON HYDROCHLORIDE 0.25 MG: 0.25 INJECTION, SOLUTION INTRAVENOUS at 08:07

## 2023-07-13 RX ADMIN — HEPARIN 500 UNITS: 100 SYRINGE at 11:07

## 2023-07-13 RX ADMIN — ETOPOSIDE 216 MG: 20 INJECTION INTRAVENOUS at 10:07

## 2023-07-13 RX ADMIN — Medication 10 ML: at 11:07

## 2023-07-13 RX ADMIN — CISPLATIN 43 MG: 1 INJECTION INTRAVENOUS at 09:07

## 2023-07-13 NOTE — PLAN OF CARE
Pt received Cisplatin & Etoposide today and tolerated well, without complications. Educated patient about Cisplatin & Etoposide (indications, side effects, possible reactions, chemotherapy precautions) and verbalized understanding.  VSS. CW port positive for blood return, saline flushed, Heparin flush instilled to dwell and de accessed prior to DC. Pt DC with no distress noted, ambulated off unit, w/ fx, w/o event, pleased.

## 2023-07-14 ENCOUNTER — INFUSION (OUTPATIENT)
Dept: INFUSION THERAPY | Facility: HOSPITAL | Age: 28
End: 2023-07-14
Payer: COMMERCIAL

## 2023-07-14 VITALS
RESPIRATION RATE: 18 BRPM | DIASTOLIC BLOOD PRESSURE: 90 MMHG | SYSTOLIC BLOOD PRESSURE: 155 MMHG | HEART RATE: 52 BPM | BODY MASS INDEX: 28.53 KG/M2 | HEIGHT: 70 IN | WEIGHT: 199.31 LBS | OXYGEN SATURATION: 99 % | TEMPERATURE: 98 F

## 2023-07-14 DIAGNOSIS — C62.11 MALIGNANT NEOPLASM OF DESCENDED RIGHT TESTIS: Primary | ICD-10-CM

## 2023-07-14 PROCEDURE — 63600175 PHARM REV CODE 636 W HCPCS: Performed by: REGISTERED NURSE

## 2023-07-14 PROCEDURE — 96413 CHEMO IV INFUSION 1 HR: CPT

## 2023-07-14 PROCEDURE — 96417 CHEMO IV INFUS EACH ADDL SEQ: CPT

## 2023-07-14 PROCEDURE — 96367 TX/PROPH/DG ADDL SEQ IV INF: CPT

## 2023-07-14 PROCEDURE — A4216 STERILE WATER/SALINE, 10 ML: HCPCS | Performed by: REGISTERED NURSE

## 2023-07-14 PROCEDURE — 25000003 PHARM REV CODE 250: Performed by: REGISTERED NURSE

## 2023-07-14 RX ORDER — HEPARIN 100 UNIT/ML
500 SYRINGE INTRAVENOUS
Status: DISCONTINUED | OUTPATIENT
Start: 2023-07-14 | End: 2023-07-14 | Stop reason: HOSPADM

## 2023-07-14 RX ORDER — SODIUM CHLORIDE 0.9 % (FLUSH) 0.9 %
10 SYRINGE (ML) INJECTION
Status: DISCONTINUED | OUTPATIENT
Start: 2023-07-14 | End: 2023-07-14 | Stop reason: HOSPADM

## 2023-07-14 RX ORDER — PROCHLORPERAZINE EDISYLATE 5 MG/ML
10 INJECTION INTRAMUSCULAR; INTRAVENOUS
Status: DISCONTINUED | OUTPATIENT
Start: 2023-07-14 | End: 2023-07-14 | Stop reason: HOSPADM

## 2023-07-14 RX ADMIN — DEXAMETHASONE SODIUM PHOSPHATE 10 MG: 4 INJECTION, SOLUTION INTRA-ARTICULAR; INTRALESIONAL; INTRAMUSCULAR; INTRAVENOUS; SOFT TISSUE at 09:07

## 2023-07-14 RX ADMIN — ETOPOSIDE 216 MG: 20 INJECTION INTRAVENOUS at 11:07

## 2023-07-14 RX ADMIN — HEPARIN 500 UNITS: 100 SYRINGE at 12:07

## 2023-07-14 RX ADMIN — CISPLATIN 43 MG: 1 INJECTION INTRAVENOUS at 10:07

## 2023-07-14 RX ADMIN — SODIUM CHLORIDE 500 ML: 9 INJECTION, SOLUTION INTRAVENOUS at 08:07

## 2023-07-14 RX ADMIN — SODIUM CHLORIDE 500 ML: 9 INJECTION, SOLUTION INTRAVENOUS at 10:07

## 2023-07-14 RX ADMIN — Medication 10 ML: at 12:07

## 2023-07-14 NOTE — PLAN OF CARE
1223 Patient tolerated C3D5 Cisplatin/ Etoposide/IVFs without incident. Vitals stable before, during, and after infusion. Port with brisk blood return and flushed without resistance before, during and after infusion, heparin locked and needle removed at discharge. Pre-medicated per orders. Patient declined ordered compazine, he states it causes him a headache.  Patient aware of his next appointment date/time (Monday at 1000), uses [facility]. To contact provider with questions or concerns. D/C ambulatory and stable with his girlfriend.

## 2023-07-17 ENCOUNTER — INFUSION (OUTPATIENT)
Dept: INFUSION THERAPY | Facility: HOSPITAL | Age: 28
End: 2023-07-17
Payer: COMMERCIAL

## 2023-07-17 VITALS
HEIGHT: 70 IN | RESPIRATION RATE: 18 BRPM | SYSTOLIC BLOOD PRESSURE: 138 MMHG | BODY MASS INDEX: 27.9 KG/M2 | WEIGHT: 194.88 LBS | TEMPERATURE: 98 F | HEART RATE: 61 BPM | DIASTOLIC BLOOD PRESSURE: 87 MMHG

## 2023-07-17 DIAGNOSIS — C62.11 MALIGNANT NEOPLASM OF DESCENDED RIGHT TESTIS: Primary | ICD-10-CM

## 2023-07-17 PROCEDURE — A4216 STERILE WATER/SALINE, 10 ML: HCPCS | Performed by: REGISTERED NURSE

## 2023-07-17 PROCEDURE — 96361 HYDRATE IV INFUSION ADD-ON: CPT

## 2023-07-17 PROCEDURE — 96413 CHEMO IV INFUSION 1 HR: CPT

## 2023-07-17 PROCEDURE — 25000003 PHARM REV CODE 250: Performed by: REGISTERED NURSE

## 2023-07-17 PROCEDURE — 63600175 PHARM REV CODE 636 W HCPCS: Performed by: REGISTERED NURSE

## 2023-07-17 RX ORDER — HEPARIN 100 UNIT/ML
500 SYRINGE INTRAVENOUS
Status: DISCONTINUED | OUTPATIENT
Start: 2023-07-17 | End: 2023-07-17 | Stop reason: HOSPADM

## 2023-07-17 RX ORDER — SODIUM CHLORIDE 0.9 % (FLUSH) 0.9 %
10 SYRINGE (ML) INJECTION
Status: DISCONTINUED | OUTPATIENT
Start: 2023-07-17 | End: 2023-07-17 | Stop reason: HOSPADM

## 2023-07-17 RX ADMIN — Medication 10 ML: at 12:07

## 2023-07-17 RX ADMIN — SODIUM CHLORIDE 1000 ML: 9 INJECTION, SOLUTION INTRAVENOUS at 10:07

## 2023-07-17 RX ADMIN — BLEOMYCIN SULFATE 30 UNITS: 30 POWDER, FOR SOLUTION INTRAMUSCULAR; INTRAPLEURAL; INTRAVENOUS; SUBCUTANEOUS at 11:07

## 2023-07-17 RX ADMIN — HEPARIN 500 UNITS: 100 SYRINGE at 12:07

## 2023-07-20 ENCOUNTER — PATIENT MESSAGE (OUTPATIENT)
Dept: HEMATOLOGY/ONCOLOGY | Facility: CLINIC | Age: 28
End: 2023-07-20
Payer: COMMERCIAL

## 2023-07-20 ENCOUNTER — HOSPITAL ENCOUNTER (OUTPATIENT)
Facility: HOSPITAL | Age: 28
Discharge: HOME | End: 2023-07-21
Attending: EMERGENCY MEDICINE | Admitting: STUDENT IN AN ORGANIZED HEALTH CARE EDUCATION/TRAINING PROGRAM
Payer: COMMERCIAL

## 2023-07-20 DIAGNOSIS — R07.89 CHEST TIGHTNESS: ICD-10-CM

## 2023-07-20 DIAGNOSIS — I82.C11 INTERNAL JUGULAR (IJ) VEIN THROMBOEMBOLISM, ACUTE, RIGHT: Primary | ICD-10-CM

## 2023-07-20 DIAGNOSIS — R00.0 TACHYCARDIA: ICD-10-CM

## 2023-07-20 DIAGNOSIS — R07.9 CHEST PAIN: ICD-10-CM

## 2023-07-20 PROBLEM — I82.90 VENOUS THROMBOEMBOLISM (VTE): Status: ACTIVE | Noted: 2023-07-20

## 2023-07-20 LAB
ALBUMIN SERPL BCP-MCNC: 3.7 G/DL (ref 3.5–5.2)
ALP SERPL-CCNC: 56 U/L (ref 55–135)
ALT SERPL W/O P-5'-P-CCNC: 11 U/L (ref 10–44)
ANION GAP SERPL CALC-SCNC: 15 MMOL/L (ref 8–16)
APTT PPP: 23.1 SEC (ref 21–32)
AST SERPL-CCNC: 15 U/L (ref 10–40)
BASOPHILS # BLD AUTO: 0.02 K/UL (ref 0–0.2)
BASOPHILS NFR BLD: 0.7 % (ref 0–1.9)
BILIRUB SERPL-MCNC: 0.3 MG/DL (ref 0.1–1)
BNP SERPL-MCNC: <10 PG/ML (ref 0–99)
BUN SERPL-MCNC: 20 MG/DL (ref 6–20)
CALCIUM SERPL-MCNC: 9.1 MG/DL (ref 8.7–10.5)
CHLORIDE SERPL-SCNC: 93 MMOL/L (ref 95–110)
CO2 SERPL-SCNC: 27 MMOL/L (ref 23–29)
CREAT SERPL-MCNC: 1.1 MG/DL (ref 0.5–1.4)
D DIMER PPP IA.FEU-MCNC: 6.93 MG/L FEU
DIFFERENTIAL METHOD: ABNORMAL
EOSINOPHIL # BLD AUTO: 0 K/UL (ref 0–0.5)
EOSINOPHIL NFR BLD: 0.3 % (ref 0–8)
ERYTHROCYTE [DISTWIDTH] IN BLOOD BY AUTOMATED COUNT: 14.6 % (ref 11.5–14.5)
EST. GFR  (NO RACE VARIABLE): >60 ML/MIN/1.73 M^2
GLUCOSE SERPL-MCNC: 104 MG/DL (ref 70–110)
HCT VFR BLD AUTO: 36.4 % (ref 40–54)
HGB BLD-MCNC: 12.2 G/DL (ref 14–18)
IMM GRANULOCYTES # BLD AUTO: 0.01 K/UL (ref 0–0.04)
IMM GRANULOCYTES NFR BLD AUTO: 0.3 % (ref 0–0.5)
INR PPP: 0.9 (ref 0.8–1.2)
INR PPP: 0.9 (ref 0.8–1.2)
LYMPHOCYTES # BLD AUTO: 1.4 K/UL (ref 1–4.8)
LYMPHOCYTES NFR BLD: 46.2 % (ref 18–48)
MCH RBC QN AUTO: 27.4 PG (ref 27–31)
MCHC RBC AUTO-ENTMCNC: 33.5 G/DL (ref 32–36)
MCV RBC AUTO: 82 FL (ref 82–98)
MONOCYTES # BLD AUTO: 0.1 K/UL (ref 0.3–1)
MONOCYTES NFR BLD: 4.5 % (ref 4–15)
NEUTROPHILS # BLD AUTO: 1.4 K/UL (ref 1.8–7.7)
NEUTROPHILS NFR BLD: 48 % (ref 38–73)
NRBC BLD-RTO: 0 /100 WBC
PLATELET # BLD AUTO: 225 K/UL (ref 150–450)
PMV BLD AUTO: 10.7 FL (ref 9.2–12.9)
POTASSIUM SERPL-SCNC: 3.7 MMOL/L (ref 3.5–5.1)
PROT SERPL-MCNC: 7 G/DL (ref 6–8.4)
PROTHROMBIN TIME: 10.1 SEC (ref 9–12.5)
PROTHROMBIN TIME: 9.8 SEC (ref 9–12.5)
RBC # BLD AUTO: 4.45 M/UL (ref 4.6–6.2)
SARS-COV-2 RDRP RESP QL NAA+PROBE: NEGATIVE
SODIUM SERPL-SCNC: 135 MMOL/L (ref 136–145)
TROPONIN I SERPL DL<=0.01 NG/ML-MCNC: <0.006 NG/ML (ref 0–0.03)
WBC # BLD AUTO: 2.92 K/UL (ref 3.9–12.7)

## 2023-07-20 PROCEDURE — 80053 COMPREHEN METABOLIC PANEL: CPT | Performed by: STUDENT IN AN ORGANIZED HEALTH CARE EDUCATION/TRAINING PROGRAM

## 2023-07-20 PROCEDURE — 25500020 PHARM REV CODE 255: Performed by: EMERGENCY MEDICINE

## 2023-07-20 PROCEDURE — 96366 THER/PROPH/DIAG IV INF ADDON: CPT

## 2023-07-20 PROCEDURE — G0378 HOSPITAL OBSERVATION PER HR: HCPCS

## 2023-07-20 PROCEDURE — 84484 ASSAY OF TROPONIN QUANT: CPT | Performed by: EMERGENCY MEDICINE

## 2023-07-20 PROCEDURE — 85730 THROMBOPLASTIN TIME PARTIAL: CPT | Performed by: EMERGENCY MEDICINE

## 2023-07-20 PROCEDURE — 25000003 PHARM REV CODE 250

## 2023-07-20 PROCEDURE — 85610 PROTHROMBIN TIME: CPT | Mod: 91 | Performed by: STUDENT IN AN ORGANIZED HEALTH CARE EDUCATION/TRAINING PROGRAM

## 2023-07-20 PROCEDURE — 85610 PROTHROMBIN TIME: CPT | Performed by: EMERGENCY MEDICINE

## 2023-07-20 PROCEDURE — U0002 COVID-19 LAB TEST NON-CDC: HCPCS | Performed by: EMERGENCY MEDICINE

## 2023-07-20 PROCEDURE — 93010 ELECTROCARDIOGRAM REPORT: CPT | Mod: ,,, | Performed by: INTERNAL MEDICINE

## 2023-07-20 PROCEDURE — 85025 COMPLETE CBC W/AUTO DIFF WBC: CPT | Performed by: STUDENT IN AN ORGANIZED HEALTH CARE EDUCATION/TRAINING PROGRAM

## 2023-07-20 PROCEDURE — 96365 THER/PROPH/DIAG IV INF INIT: CPT

## 2023-07-20 PROCEDURE — 85379 FIBRIN DEGRADATION QUANT: CPT | Performed by: EMERGENCY MEDICINE

## 2023-07-20 PROCEDURE — 99285 EMERGENCY DEPT VISIT HI MDM: CPT | Mod: 25

## 2023-07-20 PROCEDURE — 63600175 PHARM REV CODE 636 W HCPCS: Performed by: EMERGENCY MEDICINE

## 2023-07-20 PROCEDURE — 93005 ELECTROCARDIOGRAM TRACING: CPT

## 2023-07-20 PROCEDURE — 93010 EKG 12-LEAD: ICD-10-PCS | Mod: ,,, | Performed by: INTERNAL MEDICINE

## 2023-07-20 PROCEDURE — 83880 ASSAY OF NATRIURETIC PEPTIDE: CPT | Performed by: EMERGENCY MEDICINE

## 2023-07-20 RX ORDER — GLUCAGON 1 MG
1 KIT INJECTION
Status: DISCONTINUED | OUTPATIENT
Start: 2023-07-20 | End: 2023-07-21 | Stop reason: HOSPADM

## 2023-07-20 RX ORDER — IBUPROFEN 200 MG
24 TABLET ORAL
Status: DISCONTINUED | OUTPATIENT
Start: 2023-07-20 | End: 2023-07-21 | Stop reason: HOSPADM

## 2023-07-20 RX ORDER — TALC
6 POWDER (GRAM) TOPICAL NIGHTLY PRN
Status: DISCONTINUED | OUTPATIENT
Start: 2023-07-20 | End: 2023-07-21 | Stop reason: HOSPADM

## 2023-07-20 RX ORDER — SODIUM CHLORIDE 0.9 % (FLUSH) 0.9 %
10 SYRINGE (ML) INJECTION EVERY 12 HOURS PRN
Status: DISCONTINUED | OUTPATIENT
Start: 2023-07-20 | End: 2023-07-21 | Stop reason: HOSPADM

## 2023-07-20 RX ORDER — IBUPROFEN 200 MG
16 TABLET ORAL
Status: DISCONTINUED | OUTPATIENT
Start: 2023-07-20 | End: 2023-07-21 | Stop reason: HOSPADM

## 2023-07-20 RX ORDER — ACETAMINOPHEN 325 MG/1
650 TABLET ORAL EVERY 4 HOURS PRN
Status: DISCONTINUED | OUTPATIENT
Start: 2023-07-20 | End: 2023-07-21 | Stop reason: HOSPADM

## 2023-07-20 RX ORDER — NALOXONE HCL 0.4 MG/ML
0.02 VIAL (ML) INJECTION
Status: DISCONTINUED | OUTPATIENT
Start: 2023-07-20 | End: 2023-07-21 | Stop reason: HOSPADM

## 2023-07-20 RX ORDER — HEPARIN SODIUM,PORCINE/D5W 25000/250
0-40 INTRAVENOUS SOLUTION INTRAVENOUS CONTINUOUS
Status: DISCONTINUED | OUTPATIENT
Start: 2023-07-20 | End: 2023-07-20

## 2023-07-20 RX ORDER — HEPARIN SODIUM,PORCINE/D5W 25000/250
0-40 INTRAVENOUS SOLUTION INTRAVENOUS CONTINUOUS
Status: DISCONTINUED | OUTPATIENT
Start: 2023-07-20 | End: 2023-07-21

## 2023-07-20 RX ORDER — ONDANSETRON 4 MG/1
4 TABLET, FILM COATED ORAL EVERY 8 HOURS PRN
Status: DISCONTINUED | OUTPATIENT
Start: 2023-07-20 | End: 2023-07-21 | Stop reason: HOSPADM

## 2023-07-20 RX ADMIN — IOHEXOL 100 ML: 350 INJECTION, SOLUTION INTRAVENOUS at 04:07

## 2023-07-20 RX ADMIN — Medication 6 MG: at 11:07

## 2023-07-20 RX ADMIN — HEPARIN SODIUM AND DEXTROSE 18 UNITS/KG/HR: 10000; 5 INJECTION INTRAVENOUS at 08:07

## 2023-07-20 NOTE — ED PROVIDER NOTES
Encounter Date: 7/20/2023       History     Chief Complaint   Patient presents with    Edema     Pt c/o right sided neck and shoulder swelling. Onset Tuesday.  Pt has a port to right chest for chemo-being tx for testicular cancer     Pt is a 28 yo man w/ PMH of right testicular cancer currently undergoing chemo (cisplatin, etoposide, bleomycin) who presented with complaint of right arm swelling for the last two days. He says it started on Tuesday and has continued to swell since then. He has tingling in the right hand and left fingertips. He also notes pain in the right neck that worsens with movement of his arm and erythema and warmth of the right arm. He also reported that while he was in the waiting room, he experienced left eye blurriness that resolved by the time he was seen in the ER. He endorses chest tightness, pleuritic chest pain, and nausea. He denies vomiting or shortness of breath.     The history is provided by the patient and medical records. No  was used.   Review of patient's allergies indicates:   Allergen Reactions    Amoxicillin      History reviewed. No pertinent past medical history.  Past Surgical History:   Procedure Laterality Date    ORCHIECTOMY, RADICAL, INGUINAL APPROACH Right 3/31/2023    Procedure: ORCHIECTOMY, RADICAL, INGUINAL APPROACH;  Surgeon: Perico Shah MD;  Location: [facility] OR [address] [place] [address];  Service: Urology;  Laterality: Right;  90 minutes     Family History   Problem Relation Age of Onset    Hypertension Father     Diabetes Brother      Social History     Tobacco Use    Smoking status: Never    Smokeless tobacco: Never   Substance Use Topics    Alcohol use: Never    Drug use: Never       Physical Exam     Initial Vitals [07/20/23 1433]   BP Pulse Resp Temp SpO2   132/89 (!) 122 20 98.2 °F (36.8 °C) 98 %      MAP       --         Physical Exam    Nursing note and vitals reviewed.  Constitutional: He appears well-developed and well-nourished. No distress.    HENT:   Head: Normocephalic and atraumatic.   Eyes: Conjunctivae and EOM are normal. Pupils are equal, round, and reactive to light. No scleral icterus.   IOP: left eye 14, right eye 12   Neck: Neck supple. No tracheal deviation present.   Cardiovascular:  Regular rhythm and normal heart sounds.   Tachycardia present.         Pulmonary/Chest: Breath sounds normal. No stridor. No respiratory distress.   Abdominal: Abdomen is soft. Bowel sounds are normal. He exhibits no distension. There is no abdominal tenderness.   Musculoskeletal:      Cervical back: Neck supple.     Neurological: He is alert and oriented to person, place, and time. He has normal strength.   Skin: Skin is warm and dry.   Right arm warmer than left. Erythema right arm.   Psychiatric: He has a normal mood and affect. Thought content normal.       ED Course   Procedures  Labs Reviewed   CBC W/ AUTO DIFFERENTIAL   COMPREHENSIVE METABOLIC PANEL   PROTIME-INR     EKG Readings: (Independently Interpreted)   Initial Reading: No STEMI. Rhythm: Sinus Tachycardia. Heart Rate: 107. Clinical Impression: Movement Artifact and Sinus Tachycardia   Normal sinus rhythm, no sign of STEMI, tachycardia (), P waves present  No prior EKG for comparison    ECG Results              EKG 12-lead (Final result)  Result time 07/20/23 16:46:42      Final result by Interface, Lab In [facility] (07/20/23 16:46:42)                   Narrative:    Test Reason : R00.0,    Vent. Rate : 107 BPM     Atrial Rate : 107 BPM     P-R Int : 138 ms          QRS Dur : 074 ms      QT Int : 326 ms       P-R-T Axes : 072 071 054 degrees     QTc Int : 435 ms    Sinus tachycardia  Otherwise normal ECG  No previous ECGs available  Confirmed by Johny Kelley MD ([id number]) on 7/20/2023 4:46:32 PM    Referred By: [email or web address]   SELF           Confirmed By:Johny Kelley MD                                  Imaging Results              CTA Chest Non-Coronary (PE Studies) (In process)                       CT Head Without Contrast (Final result)  Result time 07/20/23 16:45:33      Final result by Wilber Carmona MD (07/20/23 16:45:33)                   Impression:      No acute intracranial abnormality.      Electronically signed by: Wilber Carmona MD  Date:    07/20/2023  Time:    16:45               Narrative:    EXAMINATION:  CT HEAD WITHOUT CONTRAST    CLINICAL HISTORY:  Neuro deficit, acute, stroke suspected;    TECHNIQUE:  Low dose axial CT images obtained throughout the head without intravenous contrast. Sagittal and coronal reconstructions were performed.    COMPARISON:  None.    FINDINGS:  Intracranial compartment:    Ventricles and sulci are normal in size for age without evidence of hydrocephalus. No extra-axial blood or fluid collections.    The brain parenchyma appears normal. No parenchymal mass, hemorrhage, edema or major vascular distribution infarct.    Skull/extracranial contents (limited evaluation): No fracture. Mastoid air cells and paranasal sinuses are essentially clear.                                       X-Ray Chest 1 View (Final result)  Result time 07/20/23 16:12:37      Final result by Chetan Mcdermott MD (07/20/23 16:12:37)                   Impression:      No acute cardiopulmonary disease      Electronically signed by: Chetan Mcdermott MD  Date:    07/20/2023  Time:    16:12               Narrative:    EXAMINATION:  XR CHEST 1 VIEW    CLINICAL HISTORY:  Other chest pain    TECHNIQUE:  Single frontal view of the chest was performed.    COMPARISON:  CT chest abdomen pelvis 3/23/23    FINDINGS:  Port catheter is present on the right with tip at SVC.  The heart size and mediastinal contour are normal.  Lungs are clear.  No pleural fluid detected.  Skeletal structures are intact.                                       Medications   iohexoL (OMNIPAQUE 350) injection 100 mL (100 mLs Intravenous Given 7/20/23 1640)     Medical Decision Making:   History:   Old Medical Records: I decided to  obtain old medical records.  Old Records Summarized: records from previous admission(s).  Initial Assessment:   Pt not in distress upon arrival in ED. Tachycardic at 120 but afebrile and other vital signs wnl on room air. His right arm was erythematous, edematous.  Presentation concerning for possible upper extremity DVT and PE.   Differential Diagnosis:   Including, but not limited to:   DVT/PE  SVC Syndrome  Metastasis  Cellulitis   Independently Interpreted Test(s):   I have ordered and independently interpreted X-rays - see summary below.       <> Summary of X-Ray Reading(s): No acute finding   I have ordered and independently interpreted EKG Reading(s) - see prior notes  Clinical Tests:   Lab Tests: Ordered and Reviewed  Radiological Study: Ordered and Reviewed  Medical Tests: Ordered and Reviewed  ED Management:  Presentation was concerning for upper extremity DVT with possible PE. US confirmed presence of thrombus in right IJ. CTA concerning for possible RLL PE but reading was limited due to motion artifacts. PT/INR wnl. Noncontrast head CT did not show an acute intracranial process. aPTT obtained and patient started on heparin. IOP checked due to left eye blurriness and right and left IOP wnl. CBC showed WBC 2.92 and Hgb 12.2. CMP showed Na 135 and Cl 93. Pt admitted to due to continued tachycardia in setting of DVT and possible PE.  Other:   I have discussed this case with another health care provider.          Attending Attestation:   Physician Attestation Statement for Resident:  As the supervising MD   Physician Attestation Statement: I have personally seen and examined this patient.   I agree with the above history.  -: 28 yo male presenting with right arm pain, swelling, extending to neck.   Also states he had a fleeting episode of left eye blurry vision (no vision loss) that resolved spontaneously.  No headache, does not wear glasses or contacts.   Intermittent chest tightness, does not feel short of  breath.  No fevers at home.    As the supervising MD I agree with the above PE.   -: No facial asymmetry or swelling  Right lower neck edema  Palpable right chest port - no bulging veins or overlying edema compared to left chest wall  RUE compartments soft, FROM joints, intact radial pulse, SILT, neurovascularly intact, diffuse edema  Lungs clear  Tachycardia  No lower extremity edema   Eye exam:  PERRL, EOMI, visual fields intact, no conjunctival injection, IOP WNL  Full strength and sensation in extremities   No hoarse voice, uvula midline, no drooling   As the supervising MD I agree with the above treatment, course, plan, and disposition.   -: EKG sinus tachycardia. No hypoxia or respiratory distress.  Considered cellulitis, but no fever, no e/o septic arthritis.  Higher suspicion for DVT vs SVC syndrome. Given tachycardia and concern for DVT, will also pursue chest imaging.  Discussed modality of CT chest (IV versus angio) with radiology to best assess for SVC vs PE - pursued CTA.  CTA unfortunately limited due to motion, but possible PE, no e/o strain.   RUE US discussed with radiology:  Thrombosis of the right internal jugular vein.  Please note, there is significantly slow flow through the subclavian vein, impending thrombosis should be considered.  Port may be possible etiology.   CTH w/o acute findings, exam of left eye WNL and occular sx resolved.   Case discussed with Heme/Onc, agree with heparin gtt. Patient denies h/o bleeding or other c/I to anti-coagulation.   Admitted to Heme/Onc for further management.  No airway compromise.     ED Diagnoses:  Leukopenia   Right IJ thrombus - acute    I have reviewed and agree with the residents interpretation of the following: lab data, x-rays, CT scans and EKG.  I have reviewed the following: old records at this facility.              ED Course as of 07/20/23 1835   Thu Jul 20, 2023   1529 Pulse(!): 122 [AB]   1823 IOP 14 left, 12 on right  [AB]      ED Course  User Index  [AB] Andrade Vaca MD                 Clinical Impression:   Final diagnoses:  [R00.0] Tachycardia  [R07.89] Chest tightness               Unique Lai MD  Resident  07/20/23 2002       Andrade Vaca MD  07/21/23 0991

## 2023-07-21 VITALS
WEIGHT: 190.06 LBS | RESPIRATION RATE: 17 BRPM | OXYGEN SATURATION: 98 % | DIASTOLIC BLOOD PRESSURE: 80 MMHG | SYSTOLIC BLOOD PRESSURE: 150 MMHG | HEART RATE: 97 BPM | TEMPERATURE: 98 F | HEIGHT: 70 IN | BODY MASS INDEX: 27.21 KG/M2

## 2023-07-21 LAB
ALBUMIN SERPL BCP-MCNC: 3.4 G/DL (ref 3.5–5.2)
ALP SERPL-CCNC: 54 U/L (ref 55–135)
ALT SERPL W/O P-5'-P-CCNC: 11 U/L (ref 10–44)
ANION GAP SERPL CALC-SCNC: 12 MMOL/L (ref 8–16)
ANISOCYTOSIS BLD QL SMEAR: SLIGHT
APTT PPP: 53 SEC (ref 21–32)
AST SERPL-CCNC: 14 U/L (ref 10–40)
BASOPHILS # BLD AUTO: 0.03 K/UL (ref 0–0.2)
BASOPHILS NFR BLD: 0.9 % (ref 0–1.9)
BILIRUB SERPL-MCNC: 0.3 MG/DL (ref 0.1–1)
BUN SERPL-MCNC: 20 MG/DL (ref 6–20)
CALCIUM SERPL-MCNC: 9 MG/DL (ref 8.7–10.5)
CHLORIDE SERPL-SCNC: 94 MMOL/L (ref 95–110)
CO2 SERPL-SCNC: 28 MMOL/L (ref 23–29)
CREAT SERPL-MCNC: 1 MG/DL (ref 0.5–1.4)
DIFFERENTIAL METHOD: ABNORMAL
EOSINOPHIL # BLD AUTO: 0 K/UL (ref 0–0.5)
EOSINOPHIL NFR BLD: 0.3 % (ref 0–8)
ERYTHROCYTE [DISTWIDTH] IN BLOOD BY AUTOMATED COUNT: 14.7 % (ref 11.5–14.5)
EST. GFR  (NO RACE VARIABLE): >60 ML/MIN/1.73 M^2
GLUCOSE SERPL-MCNC: 107 MG/DL (ref 70–110)
HCT VFR BLD AUTO: 32.4 % (ref 40–54)
HGB BLD-MCNC: 11.1 G/DL (ref 14–18)
IMM GRANULOCYTES # BLD AUTO: 0.01 K/UL (ref 0–0.04)
IMM GRANULOCYTES NFR BLD AUTO: 0.3 % (ref 0–0.5)
LYMPHOCYTES # BLD AUTO: 1.6 K/UL (ref 1–4.8)
LYMPHOCYTES NFR BLD: 49.4 % (ref 18–48)
MAGNESIUM SERPL-MCNC: 1.4 MG/DL (ref 1.6–2.6)
MCH RBC QN AUTO: 27.6 PG (ref 27–31)
MCHC RBC AUTO-ENTMCNC: 34.3 G/DL (ref 32–36)
MCV RBC AUTO: 81 FL (ref 82–98)
MONOCYTES # BLD AUTO: 0.2 K/UL (ref 0.3–1)
MONOCYTES NFR BLD: 4.7 % (ref 4–15)
NEUTROPHILS # BLD AUTO: 1.4 K/UL (ref 1.8–7.7)
NEUTROPHILS NFR BLD: 44.4 % (ref 38–73)
NRBC BLD-RTO: 0 /100 WBC
OVALOCYTES BLD QL SMEAR: ABNORMAL
PHOSPHATE SERPL-MCNC: 4.5 MG/DL (ref 2.7–4.5)
PLATELET # BLD AUTO: 205 K/UL (ref 150–450)
PLATELET BLD QL SMEAR: ABNORMAL
PMV BLD AUTO: 9.9 FL (ref 9.2–12.9)
POIKILOCYTOSIS BLD QL SMEAR: SLIGHT
POTASSIUM SERPL-SCNC: 3.7 MMOL/L (ref 3.5–5.1)
PROT SERPL-MCNC: 6.6 G/DL (ref 6–8.4)
RBC # BLD AUTO: 4.02 M/UL (ref 4.6–6.2)
SODIUM SERPL-SCNC: 134 MMOL/L (ref 136–145)
WBC # BLD AUTO: 3.22 K/UL (ref 3.9–12.7)

## 2023-07-21 PROCEDURE — 83735 ASSAY OF MAGNESIUM: CPT

## 2023-07-21 PROCEDURE — 80053 COMPREHEN METABOLIC PANEL: CPT

## 2023-07-21 PROCEDURE — 25000003 PHARM REV CODE 250

## 2023-07-21 PROCEDURE — 99239 HOSP IP/OBS DSCHRG MGMT >30: CPT | Mod: ,,, | Performed by: INTERNAL MEDICINE

## 2023-07-21 PROCEDURE — 36415 COLL VENOUS BLD VENIPUNCTURE: CPT

## 2023-07-21 PROCEDURE — 85730 THROMBOPLASTIN TIME PARTIAL: CPT

## 2023-07-21 PROCEDURE — 96366 THER/PROPH/DIAG IV INF ADDON: CPT

## 2023-07-21 PROCEDURE — 85025 COMPLETE CBC W/AUTO DIFF WBC: CPT | Performed by: EMERGENCY MEDICINE

## 2023-07-21 PROCEDURE — G0378 HOSPITAL OBSERVATION PER HR: HCPCS

## 2023-07-21 PROCEDURE — 84100 ASSAY OF PHOSPHORUS: CPT

## 2023-07-21 PROCEDURE — 99239 PR HOSPITAL DISCHARGE DAY,>30 MIN: ICD-10-PCS | Mod: ,,, | Performed by: INTERNAL MEDICINE

## 2023-07-21 RX ORDER — APIXABAN 5 MG (74)
KIT ORAL
Qty: 74 TABLET | Refills: 0 | Status: SHIPPED | OUTPATIENT
Start: 2023-07-21

## 2023-07-21 RX ADMIN — APIXABAN 10 MG: 5 TABLET, FILM COATED ORAL at 11:07

## 2023-07-21 NOTE — ED NOTES
Telemetry Verification   Patient placed on Telemetry Box  Verified on ED monitor  Box [id number]   Monitor Tech Karyn    Rate 108   Rhythm Sinus tachycardia

## 2023-07-21 NOTE — SUBJECTIVE & OBJECTIVE
Oncology Treatment Plan:   OP TESTICULAR BEP 5-DAY Q3W    Medications:  Continuous Infusions:   heparin (porcine) in D5W 18 Units/kg/hr (07/20/23 2015)     Scheduled Meds:  PRN Meds:acetaminophen, dextrose 10%, dextrose 10%, glucagon (human recombinant), glucose, glucose, [START ON 7/21/2023] heparin (PORCINE), [START ON 7/21/2023] heparin (PORCINE), melatonin, naloxone, ondansetron, sodium chloride 0.9%     Review of patient's allergies indicates:   Allergen Reactions    Amoxicillin         History reviewed. No pertinent past medical history.  Past Surgical History:   Procedure Laterality Date    ORCHIECTOMY, RADICAL, INGUINAL APPROACH Right 3/31/2023    Procedure: ORCHIECTOMY, RADICAL, INGUINAL APPROACH;  Surgeon: Perico Shah MD;  Location: [facility] OR [address] [place] [address];  Service: Urology;  Laterality: Right;  90 minutes     Family History       Problem Relation (Age of Onset)    Diabetes Brother    Hypertension Father          Tobacco Use    Smoking status: Never    Smokeless tobacco: Never   Substance and Sexual Activity    Alcohol use: Never    Drug use: Never    Sexual activity: Not Currently     Partners: Female     Birth control/protection: Condom       Review of Systems   Constitutional:  Negative for chills and fever.   HENT:  Negative for congestion and sore throat.    Eyes:  Negative for photophobia and visual disturbance.   Respiratory:  Negative for cough and shortness of breath.    Cardiovascular:  Negative for chest pain and palpitations.   Gastrointestinal:  Negative for abdominal pain.   Genitourinary:  Negative for dysuria and hematuria.   Musculoskeletal:  Negative for arthralgias and back pain.        Right arm swelling and pain in shoulder and neck   Skin:  Negative for rash and wound.   Neurological:  Negative for dizziness and syncope.   Psychiatric/Behavioral:  Negative for agitation and behavioral problems.    Objective:     Vital Signs (Most Recent):  Temp: 99.1 °F (37.3 °C) (07/20/23  1959)  Pulse: 105 (07/20/23 1839)  Resp: 20 (07/20/23 1433)  BP: 126/72 (07/20/23 1839)  SpO2: 100 % (07/20/23 1839) Vital Signs (24h Range):  Temp:  [98.2 °F (36.8 °C)-99.1 °F (37.3 °C)] 99.1 °F (37.3 °C)  Pulse:  [104-122] 105  Resp:  [20] 20  SpO2:  [98 %-100 %] 100 %  BP: (120-132)/(72-89) 126/72     Weight: 86.2 kg (190 lb)  Body mass index is 27.26 kg/m².  Body surface area is 2.06 meters squared.    No intake or output data in the 24 hours ending 07/20/23 2021     Physical Exam  Vitals reviewed.   Constitutional:       General: He is not in acute distress.     Appearance: Normal appearance.   HENT:      Head: Normocephalic and atraumatic.      Nose: No congestion or rhinorrhea.      Mouth/Throat:      Mouth: Mucous membranes are moist.      Pharynx: Oropharynx is clear.   Neck:      Comments: TTP at right lower neck above the clavicle  Cardiovascular:      Rate and Rhythm: Regular rhythm. Tachycardia present.      Pulses: Normal pulses.      Heart sounds: Normal heart sounds.   Pulmonary:      Effort: Pulmonary effort is normal. No respiratory distress.      Breath sounds: Normal breath sounds.   Abdominal:      General: Bowel sounds are normal.      Palpations: Abdomen is soft.      Tenderness: There is no abdominal tenderness.   Musculoskeletal:         General: Swelling and tenderness present.      Cervical back: Full passive range of motion without pain and normal range of motion. Tenderness present.      Right lower leg: No edema.      Left lower leg: No edema.      Comments: Some tenderness in shoulder  Swelling of RUE when compared to LUE   Skin:     General: Skin is warm and dry.   Neurological:      General: No focal deficit present.      Mental Status: He is alert and oriented to person, place, and time.   Psychiatric:         Mood and Affect: Mood normal.         Behavior: Behavior normal.        Significant Labs:   CBC:   Recent Labs   Lab 07/20/23  1614   WBC 2.92*   HGB 12.2*   HCT 36.4*   PLT  225    and CMP:   Recent Labs   Lab 07/20/23  1614   *   K 3.7   CL 93*   CO2 27      BUN 20   CREATININE 1.1   CALCIUM 9.1   PROT 7.0   ALBUMIN 3.7   BILITOT 0.3   ALKPHOS 56   AST 15   ALT 11   ANIONGAP 15       Diagnostic Results:  I have reviewed and interpreted all pertinent imaging results/findings within the past 24 hours.

## 2023-07-21 NOTE — DISCHARGE SUMMARY
[facility] - Oncology ([facility])  Hematology/Oncology  Discharge Summary      Patient Name: Arturo Salguero III  MRN: [id number]  Admission Date: 7/20/2023  Hospital Length of Stay: 0 days  Discharge Date and Time:  07/21/2023 3:58 PM  Attending Physician: Lizabeth att. providers found   Discharging Provider: Lennie Alexander MD  Primary Care Provider: Glen Andrade MD    HPI: Arturo Salguero III is a 28 yo man with Right testicular cancer s/p orchiectomy 3/31/23 and C3 of BEP on 7/13/23 who presents to the ED with complaint of right arm swelling and pain for the last two days. Patient reports that the symptoms started on Tuesday with some discomfort in his right neck and some swelling in his hands that he noticed. He says his arm continued to swell the following day and today which prompted him to present to the ED. He has tingling in the right hand and notes pain in the right shoulder and neck that worsens with movement of his arm. Patient endorses some chest tightness and discomfort on deep respirations. Patient has a Right chest port placed on 5/26/23 that he receives his chemotherapy in. He denies recent fevers, chills, SOB, abdominal pain, n/v/d, cough and he denies any leg pain or leg swelling.    In the ED, he arrived afebrile, tachycardic HR 120s, RR 20 and normotensive 132/89. Labs notable for D-Dimer 6.93; rest of labs unremarkable. CXR was unremarkable. CTA showed questionable RLL pulmonary embolism. RUE US showed Right IJ thrombus with possible involvement of the subclavian vein. Patient started on heparin infusion and was admitted to Medical Oncology for management and observation of acute symptomatic VTE.    Oncologic History (follows with Dr. Alston)  - Noted right testicular swelling and went to his PCP  - 3/20/2023 Testicular Ultrasound: Bilateral testicular enlargement with markedly heterogeneous echotexture, concerning for infiltrative process such as lymphoma.  Correlation with prior history is recommended as  sequela from bilateral remote orchitis may give a similar appearance.  Consultation with urology is recommended.  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   Embryonal carcinoma (specify percentage): 50 %   Yolk sac tumor, postpubertal type (specify percentage): 40 %   Choriocarcinoma (specify percentage): 10 %   Tumor Extent: Invades rete testis.   Lymphovascular Invasion: Present.   Margin Status: All margins negative for tumor.   Regional Lymph Node Status: Not applicable (no regional lymph nodes submitted or found).   Distant Site: Not applicable.   PATHOLOGIC STAGE CLASSIFICATION (pTNM, AJCC 8th Edition): pT2 pN not assigned (no nodes submitted or found).   Blocks for potential molecular or ancillary studies:   Tumor block: 1I or 1L.  5/26/23: Chest port placed  5/29/23: Started C1 of BEP  6/1/23 MRI abdomen showed few prominent distal retroperitoneal lymph nodes, no liver lesions concerning for metastasis  6/14/23 MRI T-spine negative for mets  6/19/23: C2 of BEP  7/10/23: C3 of BEP      * No surgery found *     Hospital Course: Arturo Salguero III is a 28 yo man with Right testicular cancer who presents to the ED with complaint of right arm swelling and pain for the last two days. His vsymptoms started with some discomfort in his right neck and some swelling in his hands that he noticed. His arm continued to swell the following day and today which prompted him to present to the ED. He has tingling in the right hand and notes pain in the right shoulder and neck that worsens with movement of his arm. who presents to the ED with complaint of right arm swelling and pain for the last two days. In the ED, he arrived afebrile, tachycardic HR 120s, RR 20 and normotensive 132/89. Labs notable for D-Dimer 6.93; rest of labs  unremarkable. CXR was unremarkable. CTA showed questionable RLL pulmonary embolism. RUE US showed Right IJ thrombus with possible involvement of the subclavian vein. Patient started on heparin infusion and was admitted to Medical Oncology for management and observation of acute symptomatic VTE. He is doing better and plan is to discharge the patient on oral anticoagulation.       Goals of Care Treatment Preferences:  Code Status: Full Code          What is most important right now is to focus on remaining as independent as possible, symptom/pain control, comfort and QOL .  Accordingly, we have decided that the best plan to meet the patient's goals includes continuing with treatment.      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  Medications:  Reconciled Home Medications:      Medication List      START taking these medications    ELIQUIS DVT-PE TREAT 30D START 5 mg (74 tabs) Dspk  Generic drug: apixaban  For the first 7 days take two 5 mg tablets twice daily.  After 7 days take one 5 mg tablet twice daily.        CONTINUE taking these medications    ALPRAZolam 0.25 MG tablet  Commonly known as: XANAX  Take 1 tablet (0.25 mg total) by mouth 3 (three) times daily as needed for Anxiety (nausea).     dexAMETHasone 4 MG Tab  Commonly known as: DECADRON  Take 1 tablet (4 mg total) by mouth every 12 (twelve)  hours as needed (take every 12 hours for 3 days following the chemotherapy with cisplatin)     LIDOcaine-prilocaine cream  Commonly known as: EMLA  Apply topically as needed (apply to site 45-60 minutes prior to port access).     OLANZapine 5 MG tablet  Commonly known as: ZyPREXA  Take 1 tablet by mouth every night x 7 days starting day 1 of each chemo cycle or as otherwise stated     ondansetron 8 MG tablet  Commonly known as: ZOFRAN  Take 1 tablet (8 mg total) by mouth every 12 (twelve) hours as needed for Nausea.     promethazine 25 MG tablet  Commonly known as: PHENERGAN  Take 1 tablet (25 mg total) by mouth every 6 (six) hours as needed for Nausea. (For breakthrough nausea if zofran is not working)            Lennie Alexander MD  Hematology/Oncology  [facility] - Oncology ([facility])

## 2023-07-21 NOTE — HOSPITAL COURSE
Arturo Salguero III is a 26 yo man with Right testicular cancer who presents to the ED with complaint of right arm swelling and pain for the last two days. His vsymptoms started with some discomfort in his right neck and some swelling in his hands that he noticed. His arm continued to swell the following day and today which prompted him to present to the ED. He has tingling in the right hand and notes pain in the right shoulder and neck that worsens with movement of his arm. who presents to the ED with complaint of right arm swelling and pain for the last two days. In the ED, he arrived afebrile, tachycardic HR 120s, RR 20 and normotensive 132/89. Labs notable for D-Dimer 6.93; rest of labs unremarkable. CXR was unremarkable. CTA showed questionable RLL pulmonary embolism. RUE US showed Right IJ thrombus with possible involvement of the subclavian vein. Patient started on heparin infusion and was admitted to Medical Oncology for management and observation of acute symptomatic VTE. He is doing better and plan is to discharge the patient on oral anticoagulation.

## 2023-07-21 NOTE — SUBJECTIVE & OBJECTIVE
Interval History: Patient is vitally stable, did well overnight on heparin infusion and is discharged on abixaban.     Oncology Treatment Plan:   OP TESTICULAR BEP 5-DAY Q3W    Medications:  Continuous Infusions:  Scheduled Meds:   apixaban  10 mg Oral BID    [START ON 7/28/2023] apixaban  5 mg Oral BID     PRN Meds:acetaminophen, dextrose 10%, dextrose 10%, glucagon (human recombinant), glucose, glucose, melatonin, naloxone, ondansetron, sodium chloride 0.9%     Review of Systems   Constitutional:  Negative for chills, fatigue and fever.   HENT:  Negative for congestion and facial swelling.    Eyes:  Negative for pain, discharge and visual disturbance.   Respiratory:  Negative for cough and shortness of breath.    Cardiovascular:  Negative for chest pain, palpitations and leg swelling.   Gastrointestinal:  Negative for abdominal distention, abdominal pain, nausea and vomiting.   Genitourinary:  Negative for difficulty urinating, dysuria and urgency.   Musculoskeletal:  Positive for joint swelling (Rt arm swelling). Negative for arthralgias, back pain and myalgias.   Skin:  Negative for color change and pallor.   Neurological:  Negative for dizziness, weakness, light-headedness and headaches.   Psychiatric/Behavioral:  Negative for agitation, behavioral problems and confusion.    Objective:     Vital Signs (Most Recent):  Temp: 98.3 °F (36.8 °C) (07/21/23 1049)  Pulse: 97 (07/21/23 1049)  Resp: 17 (07/21/23 1049)  BP: (!) 150/80 (07/21/23 1049)  SpO2: 98 % (07/21/23 1049) Vital Signs (24h Range):  Temp:  [97.7 °F (36.5 °C)-99.3 °F (37.4 °C)] 98.3 °F (36.8 °C)  Pulse:  [] 97  Resp:  [17-20] 17  SpO2:  [96 %-100 %] 98 %  BP: (116-151)/(71-84) 150/80     Weight: 86.2 kg (190 lb 0.6 oz)  Body mass index is 27.27 kg/m².  Body surface area is 2.06 meters squared.      Intake/Output Summary (Last 24 hours) at 7/21/2023 1540  Last data filed at 7/21/2023 1024  Gross per 24 hour   Intake 250 ml   Output --   Net 250 ml         Physical Exam  Constitutional:       General: He is not in acute distress.     Appearance: He is not ill-appearing, toxic-appearing or diaphoretic.   HENT:      Head: Atraumatic.      Nose: No rhinorrhea.   Eyes:      Extraocular Movements: Extraocular movements intact.   Neck:      Vascular: No carotid bruit.   Cardiovascular:      Rate and Rhythm: Normal rate and regular rhythm.   Pulmonary:      Effort: No respiratory distress.      Breath sounds: Normal breath sounds. No stridor. No wheezing.   Abdominal:      General: Bowel sounds are normal. There is no distension.      Palpations: Abdomen is soft.      Tenderness: There is no abdominal tenderness.   Musculoskeletal:         General: No swelling or tenderness. Normal range of motion.      Cervical back: No rigidity or tenderness.   Skin:     Findings: No erythema or rash.   Neurological:      Mental Status: He is oriented to person, place, and time.   Psychiatric:         Mood and Affect: Mood normal.         Behavior: Behavior normal.        Significant Labs:   All pertinent labs from the last 24 hours have been reviewed.    Diagnostic Results:  I have reviewed and interpreted all pertinent imaging results/findings within the past 24 hours.

## 2023-07-21 NOTE — HPI
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 Patient reports that the symptoms started on Tuesday with some discomfort in his right neck and some swelling in his hands that he noticed. He says his arm continued to swell the following day and today which prompted him to present to the ED. He has tingling in the right hand and notes pain in the right shoulder and neck that worsens with movement of his arm. Patient endorses some chest tightness and discomfort on deep respirations. Patient has a Right chest port placed on 5/26/23 that he receives his chemotherapy in. He denies recent fevers, chills, SOB, abdominal pain, n/v/d, cough and he denies any leg pain or leg swelling.    In the ED, he arrived afebrile, tachycardic HR 120s, RR 20 and normotensive 132/89. Labs notable for D-Dimer 6.93; rest of labs unremarkable. CXR was unremarkable. CTA showed questionable RLL pulmonary embolism. RUE US showed Right IJ thrombus with possible involvement of the subclavian vein. Patient started on heparin infusion and was admitted to Medical Oncology for management and observation of acute symptomatic VTE.    Oncologic History (follows with Dr. Alston)  - Noted right testicular swelling and went to his PCP  - 3/20/2023 Testicular Ultrasound: Bilateral testicular enlargement with markedly heterogeneous echotexture, concerning for infiltrative process such as lymphoma.  Correlation with prior history is recommended as sequela from bilateral remote orchitis may give a similar appearance.  Consultation with urology is recommended.  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   Embryonal  carcinoma (specify percentage): 50 %   Yolk sac tumor, postpubertal type (specify percentage): 40 %   Choriocarcinoma (specify percentage): 10 %   Tumor Extent: Invades rete testis.   Lymphovascular Invasion: Present.   Margin Status: All margins negative for tumor.   Regional Lymph Node Status: Not applicable (no regional lymph nodes submitted or found).   Distant Site: Not applicable.   PATHOLOGIC STAGE CLASSIFICATION (pTNM, AJCC 8th Edition): pT2 pN not assigned (no nodes submitted or found).   Blocks for potential molecular or ancillary studies:   Tumor block: 1I or 1L.  5/26/23: Chest port placed  5/29/23: Started C1 of BEP  6/1/23 MRI abdomen showed few prominent distal retroperitoneal lymph nodes, no liver lesions concerning for metastasis  6/14/23 MRI T-spine negative for mets  6/19/23: C2 of BEP  7/10/23: C3 of BEP

## 2023-07-21 NOTE — CONSULTS
Consult received. Patient will be admitted to Medical Oncology for management and observation for acute symptomatic VTE. Full H&P note to follow.    Nate Raza MD   Internal Medicine PGY-2

## 2023-07-21 NOTE — PROGRESS NOTES
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 Patient reports that the symptoms started on Tuesday with some discomfort in his right neck and some swelling in his hands that he noticed. He says his arm continued to swell the following day and today which prompted him to present to the ED. He has tingling in the right hand and notes pain in the right shoulder and neck that worsens with movement of his arm. Patient endorses some chest tightness and discomfort on deep respirations. Patient has a Right chest port placed on 5/26/23 that he receives his chemotherapy in. He denies recent fevers, chills, SOB, abdominal pain, n/v/d, cough and he denies any leg pain or leg swelling.    In the ED, he arrived afebrile, tachycardic HR 120s, RR 20 and normotensive 132/89. Labs notable for D-Dimer 6.93; rest of labs unremarkable. CXR was unremarkable. CTA showed questionable RLL pulmonary embolism. RUE US showed Right IJ thrombus with possible involvement of the subclavian vein. Patient started on heparin infusion and was admitted to Medical Oncology for management and observation of acute symptomatic VTE.    Oncologic History (follows with Dr. Alston)  - Noted right testicular swelling and went to his PCP  - 3/20/2023 Testicular Ultrasound: Bilateral testicular enlargement with markedly heterogeneous echotexture, concerning for infiltrative process such as lymphoma.  Correlation with prior history is recommended as sequela from bilateral remote orchitis may give a similar appearance.  Consultation with urology is recommended.  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   Embryonal carcinoma (specify percentage): 50 %   Yolk sac tumor, postpubertal type (specify percentage): 40 %   Choriocarcinoma (specify percentage): 10 %   Tumor Extent: Invades rete testis.   Lymphovascular Invasion: Present.   Margin Status: All margins negative for tumor.   Regional Lymph Node Status: Not applicable (no regional lymph nodes submitted or found).   Distant Site: Not applicable.   PATHOLOGIC STAGE CLASSIFICATION (pTNM, AJCC 8th Edition): pT2 pN not assigned (no nodes submitted or found).   Blocks for potential molecular or ancillary studies:   Tumor block: 1I or 1L.  5/26/23: Chest port placed  5/29/23: Started C1 of BEP  6/1/23 MRI abdomen showed few prominent distal retroperitoneal lymph nodes, no liver lesions concerning for metastasis  6/14/23 MRI T-spine negative for mets  6/19/23: C2 of BEP  7/10/23: C3 of BEP      Interval History: Patient is vitally stable, did well overnight on heparin infusion and is discharged on abixaban.     Oncology Treatment Plan:   OP TESTICULAR BEP 5-DAY Q3W    Medications:  Continuous Infusions:  Scheduled Meds:   apixaban  10 mg Oral BID    [START ON 7/28/2023] apixaban  5 mg Oral BID     PRN Meds:acetaminophen, dextrose 10%, dextrose 10%, glucagon (human recombinant), glucose, glucose, melatonin, naloxone, ondansetron, sodium chloride 0.9%     Review of Systems   Constitutional:  Negative for chills, fatigue and fever.   HENT:  Negative for congestion and facial swelling.    Eyes:  Negative for pain, discharge and visual disturbance.   Respiratory:  Negative for cough and shortness of breath.    Cardiovascular:  Negative for chest pain, palpitations and leg swelling.   Gastrointestinal:  Negative for abdominal distention, abdominal pain, nausea and vomiting.    Genitourinary:  Negative for difficulty urinating, dysuria and urgency.   Musculoskeletal:  Positive for joint swelling (Rt arm swelling). Negative for arthralgias, back pain and myalgias.   Skin:  Negative for color change and pallor.   Neurological:  Negative for dizziness, weakness, light-headedness and headaches.   Psychiatric/Behavioral:  Negative for agitation, behavioral problems and confusion.    Objective:     Vital Signs (Most Recent):  Temp: 98.3 °F (36.8 °C) (07/21/23 1049)  Pulse: 97 (07/21/23 1049)  Resp: 17 (07/21/23 1049)  BP: (!) 150/80 (07/21/23 1049)  SpO2: 98 % (07/21/23 1049) Vital Signs (24h Range):  Temp:  [97.7 °F (36.5 °C)-99.3 °F (37.4 °C)] 98.3 °F (36.8 °C)  Pulse:  [] 97  Resp:  [17-20] 17  SpO2:  [96 %-100 %] 98 %  BP: (116-151)/(71-84) 150/80     Weight: 86.2 kg (190 lb 0.6 oz)  Body mass index is 27.27 kg/m².  Body surface area is 2.06 meters squared.      Intake/Output Summary (Last 24 hours) at 7/21/2023 1540  Last data filed at 7/21/2023 1024  Gross per 24 hour   Intake 250 ml   Output --   Net 250 ml        Physical Exam  Constitutional:       General: He is not in acute distress.     Appearance: He is not ill-appearing, toxic-appearing or diaphoretic.   HENT:      Head: Atraumatic.      Nose: No rhinorrhea.   Eyes:      Extraocular Movements: Extraocular movements intact.   Neck:      Vascular: No carotid bruit.   Cardiovascular:      Rate and Rhythm: Normal rate and regular rhythm.   Pulmonary:      Effort: No respiratory distress.      Breath sounds: Normal breath sounds. No stridor. No wheezing.   Abdominal:      General: Bowel sounds are normal. There is no distension.      Palpations: Abdomen is soft.      Tenderness: There is no abdominal tenderness.   Musculoskeletal:         General: No swelling or tenderness. Normal range of motion.      Cervical back: No rigidity or tenderness.   Skin:     Findings: No erythema or rash.   Neurological:      Mental Status: He is  oriented to person, place, and time.   Psychiatric:         Mood and Affect: Mood normal.         Behavior: Behavior normal.        Significant Labs:   All pertinent labs from the last 24 hours have been reviewed.    Diagnostic Results:  I have reviewed and interpreted all pertinent imaging results/findings within the past 24 hours.    Assessment/Plan:     * Venous thromboembolism (VTE)  27M with Right testicular cancer on chemotherapy via PICC in [facility], p/w 3 days of RUE swelling and Right neck/shoulder tenderness. US showing Right IJ thrombus with possible subclavian involvement. CTA with questionable RLL PE. Pt with tachycardia and some chest discomfort/tightness with respiration, was started on heparin infusion in the ED.    Plan:  - Continued heparin infusion overnight, discharged today on Apixaban.  - PESI score of 89 indicating intermediate risk, however with patient remaining HDS, CTA not showing right heart dysfunction and with normal troponin and BNP, will defer Echo at this time  - Tylenol PRN for pain    Malignant neoplasm of descended right testis  Oncologic History  - Noted right testicular swelling and went to his PCP  - 3/20/2023 Testicular Ultrasound: Bilateral testicular enlargement with markedly heterogeneous echotexture, concerning for infiltrative process such as lymphoma.  Correlation with prior history is recommended as sequela from bilateral remote orchitis may give a similar appearance.  Consultation with urology is recommended.  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   Embryonal carcinoma (specify percentage): 50 %   Yolk sac tumor, postpubertal type (specify percentage): 40 %   Choriocarcinoma (specify percentage): 10 %   Tumor Extent: Invades rete testis.   Lymphovascular Invasion: Present.    Margin Status: All margins negative for tumor.   Regional Lymph Node Status: Not applicable (no regional lymph nodes submitted or found).   Distant Site: Not applicable.   PATHOLOGIC STAGE CLASSIFICATION (pTNM, AJCC 8th Edition): pT2 pN not assigned (no nodes submitted or found).   Blocks for potential molecular or ancillary studies:   Tumor block: 1I or 1L.  5/26/23: Chest port placed  5/29/23: Started C1 of BEP  6/1/23 MRI abdomen showed few prominent distal retroperitoneal lymph nodes, no liver lesions concerning for metastasis  6/14/23 MRI T-spine negative for mets  6/19/23: C2 of BEP  7/10/23: C3 of BEP    - Zofran PRN for nausea/vomiting.             Lennie Alexander MD  Hematology/Oncology  [facility] - Oncology ([facility])

## 2023-07-21 NOTE — PHARMACY MED REC
"Admission Medication History     The home medication history was taken by Giovanni Flores.    You may go to "Admission" then "Reconcile Home Medications" tabs to review and/or act upon these items.     The home medication list has been updated by the Pharmacy department.   Please read ALL comments highlighted in yellow.   Please address this information as you see fit.    Feel free to contact us if you have any questions or require assistance.      The medications listed below were removed from the home medication list. Please reorder if appropriate:  Patient reports no longer taking the following medication(s):  DOXYCYCLINE 100 MG CAPSULE  MUPIROCIN 2 % OINTMENT  OXYCODONE IR 5 MG TABLET      Medications listed below were obtained from: Patient/family and Analytic software- [employer] Medications   Medication Sig    ALPRAZolam (XANAX) 0.25 MG tablet   Take 1 tablet (0.25 mg total) by mouth 3 (three) times daily as needed for Anxiety (nausea).    dexAMETHasone (DECADRON) 4 MG Tab     Take 1 tablet (4 mg total) by mouth every 12 (twelve) hours as needed (take every 12 hours for 3 days following the chemotherapy with cisplatin)    LIDOcaine-prilocaine (EMLA) cream   Apply topically as needed (apply to site 45-60 minutes prior to port access).    OLANZapine (ZYPREXA) 5 MG tablet     Take 1 tablet by mouth every night x 7 days starting day 1 of each chemo cycle or as otherwise stated    ondansetron (ZOFRAN) 8 MG tablet   Take 1 tablet (8 mg total) by mouth every 12 (twelve) hours as needed for Nausea.    promethazine (PHENERGAN) 25 MG tablet     Take 1 tablet (25 mg total) by mouth every 6 (six) hours as needed for Nausea. (For breakthrough nausea if zofran is not working)       Potential issues to be addressed PRIOR TO DISCHARGE  Please discuss with the patient barriers to adherence with medication treatment plans  Patient requires education regarding drug therapies     Giovanni Flores  EXT " [id number]                  .

## 2023-07-21 NOTE — ASSESSMENT & PLAN NOTE
27M with Right testicular cancer on chemotherapy via PICC in [facility], p/w 3 days of RUE swelling and Right neck/shoulder tenderness. US showing Right IJ thrombus with possible subclavian involvement. CTA with questionable RLL PE. Pt with tachycardia and some chest discomfort/tightness with respiration, was started on heparin infusion in the ED.    Plan:  - Continue heparin infusion, close monitoring of pt's vitals overnight  - If pt remains hemodynamically stable overnight, will transition from heparin gtt to DOAC on AM of 7/20  - PESI score of 89 indicating intermediate risk, however with patient remaining HDS, CTA not showing right heart dysfunction and with normal troponin and BNP, will defer Echo at this time  - Tylenol PRN for pain

## 2023-07-21 NOTE — PLAN OF CARE
Discharge instructions and prescriptions given and explained to pt.  Pt. verbalized understanding with no further questions. Peripheral IV D/C'd with catheter tip intact. VS WDL. Patient is awaiting ride home by significant other.     ROAD TEST  O=SpO2 96% on RA  A=Ambulating around room and hallway  D=IV D/C'd  T=Tolerating regular diet  E=Voids  S=Performs self care independently  T=Teaching on wounds care complete. N/a

## 2023-07-21 NOTE — ASSESSMENT & PLAN NOTE
Oncologic History  - Noted right testicular swelling and went to his PCP  - 3/20/2023 Testicular Ultrasound: Bilateral testicular enlargement with markedly heterogeneous echotexture, concerning for infiltrative process such as lymphoma.  Correlation with prior history is recommended as sequela from bilateral remote orchitis may give a similar appearance.  Consultation with urology is recommended.  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   Embryonal carcinoma (specify percentage): 50 %   Yolk sac tumor, postpubertal type (specify percentage): 40 %   Choriocarcinoma (specify percentage): 10 %   Tumor Extent: Invades rete testis.   Lymphovascular Invasion: Present.   Margin Status: All margins negative for tumor.   Regional Lymph Node Status: Not applicable (no regional lymph nodes submitted or found).   Distant Site: Not applicable.   PATHOLOGIC STAGE CLASSIFICATION (pTNM, AJCC 8th Edition): pT2 pN not assigned (no nodes submitted or found).   Blocks for potential molecular or ancillary studies:   Tumor block: 1I or 1L.  5/26/23: Chest port placed  5/29/23: Started C1 of BEP  6/1/23 MRI abdomen showed few prominent distal retroperitoneal lymph nodes, no liver lesions concerning for metastasis  6/14/23 MRI T-spine negative for mets  6/19/23: C2 of BEP  7/10/23: C3 of BEP    - Zofran PRN for nausea/vomiting.

## 2023-07-21 NOTE — ASSESSMENT & PLAN NOTE
27M with Right testicular cancer on chemotherapy via PICC in [facility], p/w 3 days of RUE swelling and Right neck/shoulder tenderness. US showing Right IJ thrombus with possible subclavian involvement. CTA with questionable RLL PE. Pt with tachycardia and some chest discomfort/tightness with respiration, was started on heparin infusion in the ED.    Plan:  - Continued heparin infusion overnight, discharged today on Apixaban.  - PESI score of 89 indicating intermediate risk, however with patient remaining HDS, CTA not showing right heart dysfunction and with normal troponin and BNP, will defer Echo at this time  - Tylenol PRN for pain

## 2023-07-21 NOTE — ASSESSMENT & PLAN NOTE
Oncologic History  - Noted right testicular swelling and went to his PCP  - 3/20/2023 Testicular Ultrasound: Bilateral testicular enlargement with markedly heterogeneous echotexture, concerning for infiltrative process such as lymphoma.  Correlation with prior history is recommended as sequela from bilateral remote orchitis may give a similar appearance.  Consultation with urology is recommended.  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   Embryonal carcinoma (specify percentage): 50 %   Yolk sac tumor, postpubertal type (specify percentage): 40 %   Choriocarcinoma (specify percentage): 10 %   Tumor Extent: Invades rete testis.   Lymphovascular Invasion: Present.   Margin Status: All margins negative for tumor.   Regional Lymph Node Status: Not applicable (no regional lymph nodes submitted or found).   Distant Site: Not applicable.   PATHOLOGIC STAGE CLASSIFICATION (pTNM, AJCC 8th Edition): pT2 pN not assigned (no nodes submitted or found).   Blocks for potential molecular or ancillary studies:   Tumor block: 1I or 1L.  5/26/23: Chest port placed  5/29/23: Started C1 of BEP  6/1/23 MRI abdomen showed few prominent distal retroperitoneal lymph nodes, no liver lesions concerning for metastasis  6/14/23 MRI T-spine negative for mets  6/19/23: C2 of BEP  7/10/23: C3 of BEP    - Zofran PRN for n/v

## 2023-07-21 NOTE — PLAN OF CARE
[facility] (Hospital)  Initial Discharge Assessment       Primary Care Provider: Glen Andrade MD    Admission Diagnosis: Chest tightness [R07.89]  Tachycardia [R00.0]  Chest pain [R07.9]  Internal jugular (IJ) vein thromboembolism, acute, right [I82.C11]    Admission Date: 7/20/2023  Expected Discharge Date: 7/25/2023    Transition of Care Barriers: (P) None    Payor: BLUE CROSS BLUE SHIELD / Plan: BLUE CONNECT / Product Type: HMO /     Extended Emergency Contact Information  Primary Emergency Contact: trevon german  Mobile Phone: [phone]  Relation: Mother  Secondary Emergency Contact: Brandideelver,[place]   [place]  Mobile Phone: [phone]  Relation: Significant other    Discharge Plan A: (P) Home, Home with family         [facility]  [address]  [facility] [address]  Phone: [phone] Fax: [phone]    [facility]  [address]  [place] [address]  Phone: [phone] Fax: [phone]    [id number] - [place], [place] - [address]  [address]  [place] [address]  Phone: [phone] Fax: [phone]      Initial Assessment (most recent)       Adult Discharge Assessment - 07/21/23 0346          Discharge Assessment    Assessment Type Discharge Planning Assessment (P)      Confirmed/corrected address, phone number and insurance Yes (P)      Confirmed Demographics Correct on Facesheet (P)      Source of Information patient (P)      When was your last doctors appointment? -- (P)    In March    Does patient/caregiver understand observation status Yes (P)      Communicated PAULA with patient/caregiver Yes (P)      Reason For Admission Pain in neck and arm (P)      People in Home significant other (P)      Facility Arrived From: Home (P)      Do you expect to return to your current living situation? Yes (P)      Do you have help at home or someone to help you manage your care at home? No (P)      Prior to  hospitilization cognitive status: Alert/Oriented (P)      Current cognitive status: Alert/Oriented (P)      Home Accessibility not wheelchair accessible (P)      Home Layout Bedroom on 2nd floor;Bathroom on 2nd floor (P)      Equipment Currently Used at Home none (P)      Readmission within 30 days? No (P)      Patient currently being followed by outpatient case management? No (P)      Do you currently have service(s) that help you manage your care at home? No (P)      Do you take prescription medications? Yes (P)      Do you have prescription coverage? Yes (P)      Coverage BCBS (P)      Do you have any problems affording any of your prescribed medications? No (P)      Is the patient taking medications as prescribed? yes (P)      Who is going to help you get home at discharge? Significant other (P)      How do you get to doctors appointments? car, drives self (P)      Are you on dialysis? No (P)      Do you take coumadin? No (P)      Discharge Plan A Home;Home with family (P)      DME Needed Upon Discharge  none (P)      Discharge Plan discussed with: Patient (P)      Transition of Care Barriers None (P)         Physical Activity    On average, how many days per week do you engage in moderate to strenuous exercise (like a brisk walk)? 0 days (P)      On average, how many minutes do you engage in exercise at this level? 0 min (P)         Financial Resource Strain    How hard is it for you to pay for the very basics like food, housing, medical care, and heating? Not hard at all (P)         Housing Stability    In the last 12 months, was there a time when you were not able to pay the mortgage or rent on time? No (P)      In the last 12 months, how many places have you lived? 1 (P)      In the last 12 months, was there a time when you did not have a steady place to sleep or slept in a shelter (including now)? No (P)         Transportation Needs    In the past 12 months, has lack of transportation kept you from medical  appointments or from getting medications? No (P)      In the past 12 months, has lack of transportation kept you from meetings, work, or from getting things needed for daily living? No (P)         Food Insecurity    Within the past 12 months, you worried that your food would run out before you got the money to buy more. Never true (P)      Within the past 12 months, the food you bought just didn't last and you didn't have money to get more. Never true (P)         Stress    Do you feel stress - tense, restless, nervous, or anxious, or unable to sleep at night because your mind is troubled all the time - these days? Not at all (P)         Social Connections    In a typical week, how many times do you talk on the phone with family, friends, or neighbors? More than three times a week (P)      How often do you get together with friends or relatives? Never (P)      How often do you attend Advent or Buddhism services? Never (P)      Do you belong to any clubs or organizations such as Advent groups, unions, fraternal or athletic groups, or school groups? No (P)      How often do you attend meetings of the clubs or organizations you belong to? Never (P)      Are you , , , , never , or living with a partner? Living with partner (P)         Alcohol Use    Q1: How often do you have a drink containing alcohol? Never (P)      Q2: How many drinks containing alcohol do you have on a typical day when you are drinking? Patient does not drink (P)      Q3: How often do you have six or more drinks on one occasion? Never (P)         OTHER    Name(s) of People in Home Girlfriend (P)

## 2023-07-21 NOTE — PLAN OF CARE
07/21/23 0349   Post-Acute Status   Post-Acute Authorization Other   Coverage BCBS   Other Status No Post-Acute Service Needs   Discharge Delays None known at this time   Discharge Plan   Discharge Plan A Home;Home with family     Patient lives with his significant other at his home. Patient stated that he is independent.    Patient stated that he can safely return to his home on discharge.    Patient denies any post acute needs at this time.     MATTIE Salgado, MSW-LMSW  Medical Social Worker/  ER Department

## 2023-07-21 NOTE — DISCHARGE SUMMARY
[facility] ([facility])  Hematology/Oncology  Discharge Summary      Patient Name: Arturo Salguero III  MRN: [id number]  Admission Date: 7/20/2023  Hospital Length of Stay: 0 days  Discharge Date and Time:  07/21/2023 3:54 PM  Attending Physician: Lizabeth att. 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 Patient reports that the symptoms started on Tuesday with some discomfort in his right neck and some swelling in his hands that he noticed. He says his arm continued to swell the following day and today which prompted him to present to the ED. He has tingling in the right hand and notes pain in the right shoulder and neck that worsens with movement of his arm. Patient endorses some chest tightness and discomfort on deep respirations. Patient has a Right chest port placed on 5/26/23 that he receives his chemotherapy in. He denies recent fevers, chills, SOB, abdominal pain, n/v/d, cough and he denies any leg pain or leg swelling.    In the ED, he arrived afebrile, tachycardic HR 120s, RR 20 and normotensive 132/89. Labs notable for D-Dimer 6.93; rest of labs unremarkable. CXR was unremarkable. CTA showed questionable RLL pulmonary embolism. RUE US showed Right IJ thrombus with possible involvement of the subclavian vein. Patient started on heparin infusion and was admitted to Medical Oncology for management and observation of acute symptomatic VTE.    Oncologic History (follows with Dr. Alston)  - Noted right testicular swelling and went to his PCP  - 3/20/2023 Testicular Ultrasound: Bilateral testicular enlargement with markedly heterogeneous echotexture, concerning for infiltrative process such as lymphoma.  Correlation with prior history is recommended as  sequela from bilateral remote orchitis may give a similar appearance.  Consultation with urology is recommended.  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   Embryonal carcinoma (specify percentage): 50 %   Yolk sac tumor, postpubertal type (specify percentage): 40 %   Choriocarcinoma (specify percentage): 10 %   Tumor Extent: Invades rete testis.   Lymphovascular Invasion: Present.   Margin Status: All margins negative for tumor.   Regional Lymph Node Status: Not applicable (no regional lymph nodes submitted or found).   Distant Site: Not applicable.   PATHOLOGIC STAGE CLASSIFICATION (pTNM, AJCC 8th Edition): pT2 pN not assigned (no nodes submitted or found).   Blocks for potential molecular or ancillary studies:   Tumor block: 1I or 1L.  5/26/23: Chest port placed  5/29/23: Started C1 of BEP  6/1/23 MRI abdomen showed few prominent distal retroperitoneal lymph nodes, no liver lesions concerning for metastasis  6/14/23 MRI T-spine negative for mets  6/19/23: C2 of BEP  7/10/23: C3 of BEP      * No surgery found *     Hospital Course: Arturo Salguero III is a 26 yo man with Right testicular cancer who presents to the ED with complaint of right arm swelling and pain for the last two days. His vsymptoms started with some discomfort in his right neck and some swelling in his hands that he noticed. His arm continued to swell the following day and today which prompted him to present to the ED. He has tingling in the right hand and notes pain in the right shoulder and neck that worsens with movement of his arm. who presents to the ED with complaint of right arm swelling and pain for the last two days. In the ED, he arrived afebrile, tachycardic HR 120s, RR 20 and normotensive 132/89. Labs notable for D-Dimer 6.93; rest of labs  unremarkable. CXR was unremarkable. CTA showed questionable RLL pulmonary embolism. RUE US showed Right IJ thrombus with possible involvement of the subclavian vein. Patient started on heparin infusion and was admitted to Medical Oncology for management and observation of acute symptomatic VTE. He is doing better and plan is to discharge the patient on oral anticoagulation.       Goals of Care Treatment Preferences:  Code Status: Full Code          What is most important right now is to focus on remaining as independent as possible, symptom/pain control, comfort and QOL .  Accordingly, we have decided that the best plan to meet the patient's goals includes continuing with treatment.      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  Medications:  Transfer Medications (for Discharge Readmit only):   Current Facility-Administered Medications   Medication Dose Route Frequency Provider Last Rate Last Admin    acetaminophen tablet 650 mg  650 mg Oral Q4H PRISABEL Raza MD        apixaban tablet 10 mg  10 mg Oral BID Lennie Alexander MD   10 mg at 07/21/23 1135    [START ON 7/28/2023] apixaban tablet 5 mg  5 mg Oral BID Lennie Alexander MD        dextrose 10% bolus 125 mL 125 mL  12.5 g Intravenous PRN Nate Raza MD        dextrose 10% bolus 250 mL 250 mL   25 g Intravenous PRN Nate Raza MD        glucagon (human recombinant) injection 1 mg  1 mg Intramuscular PRN Nate Raza MD        glucose chewable tablet 16 g  16 g Oral PRN Nate Raza MD        glucose chewable tablet 24 g  24 g Oral PRN Nate Raza MD        melatonin tablet 6 mg  6 mg Oral Nightly PRN Nate Raza MD   6 mg at 07/20/23 2348    naloxone 0.4 mg/mL injection 0.02 mg  0.02 mg Intravenous PRN Nate Raza MD        ondansetron tablet 4 mg  4 mg Oral Q8H PRN Nate Raza MD        sodium chloride 0.9% flush 10 mL  10 mL Intravenous Q12H PRN Nate Raza MD         Current Outpatient Medications   Medication Sig Dispense Refill    ALPRAZolam (XANAX) 0.25 MG tablet Take 1 tablet (0.25 mg total) by mouth 3 (three) times daily as needed for Anxiety (nausea). 30 tablet 0    dexAMETHasone (DECADRON) 4 MG Tab Take 1 tablet (4 mg total) by mouth every 12 (twelve) hours as needed (take every 12 hours for 3 days following the chemotherapy with cisplatin) 120 tablet 0    LIDOcaine-prilocaine (EMLA) cream Apply topically as needed (apply to site 45-60 minutes prior to port access). 30 g 5    OLANZapine (ZYPREXA) 5 MG tablet Take 1 tablet by mouth every night x 7 days starting day 1 of each chemo cycle or as otherwise stated 30 tablet 0    ondansetron (ZOFRAN) 8 MG tablet Take 1 tablet (8 mg total) by mouth every 12 (twelve) hours as needed for Nausea. 30 tablet 2    apixaban (ELIQUIS DVT-PE TREAT 30D START) 5 mg (74 tabs) DsPk For the first 7 days take two 5 mg tablets twice daily.  After 7 days take one 5 mg tablet twice daily. 74 tablet 0    promethazine (PHENERGAN) 25 MG tablet Take 1 tablet (25 mg total) by mouth every 6 (six) hours as needed for Nausea. (For breakthrough nausea if zofran is not working) 30 tablet 3       Lennie Alexander MD  Hematology/Oncology  [facility] - Oncology ([facility])

## 2023-07-24 ENCOUNTER — INFUSION (OUTPATIENT)
Dept: INFUSION THERAPY | Facility: HOSPITAL | Age: 28
End: 2023-07-24
Payer: COMMERCIAL

## 2023-07-24 VITALS
HEIGHT: 70 IN | WEIGHT: 198.63 LBS | HEART RATE: 99 BPM | BODY MASS INDEX: 28.44 KG/M2 | TEMPERATURE: 99 F | SYSTOLIC BLOOD PRESSURE: 131 MMHG | RESPIRATION RATE: 18 BRPM | DIASTOLIC BLOOD PRESSURE: 75 MMHG

## 2023-07-24 DIAGNOSIS — C62.11 MALIGNANT NEOPLASM OF DESCENDED RIGHT TESTIS: Primary | ICD-10-CM

## 2023-07-24 PROCEDURE — A4216 STERILE WATER/SALINE, 10 ML: HCPCS | Performed by: REGISTERED NURSE

## 2023-07-24 PROCEDURE — 25000003 PHARM REV CODE 250: Performed by: REGISTERED NURSE

## 2023-07-24 PROCEDURE — 96413 CHEMO IV INFUSION 1 HR: CPT

## 2023-07-24 PROCEDURE — 63600175 PHARM REV CODE 636 W HCPCS: Performed by: REGISTERED NURSE

## 2023-07-24 RX ORDER — HEPARIN 100 UNIT/ML
500 SYRINGE INTRAVENOUS
Status: DISCONTINUED | OUTPATIENT
Start: 2023-07-24 | End: 2023-07-24 | Stop reason: HOSPADM

## 2023-07-24 RX ORDER — SODIUM CHLORIDE 0.9 % (FLUSH) 0.9 %
10 SYRINGE (ML) INJECTION
Status: DISCONTINUED | OUTPATIENT
Start: 2023-07-24 | End: 2023-07-24 | Stop reason: HOSPADM

## 2023-07-24 RX ADMIN — HEPARIN 500 UNITS: 100 SYRINGE at 12:07

## 2023-07-24 RX ADMIN — BLEOMYCIN SULFATE 30 UNITS: 30 POWDER, FOR SOLUTION INTRAMUSCULAR; INTRAPLEURAL; INTRAVENOUS; SUBCUTANEOUS at 11:07

## 2023-07-24 RX ADMIN — Medication 10 ML: at 12:07

## 2023-07-24 RX ADMIN — SODIUM CHLORIDE: 9 INJECTION, SOLUTION INTRAVENOUS at 11:07

## 2023-07-27 ENCOUNTER — PATIENT MESSAGE (OUTPATIENT)
Dept: HEMATOLOGY/ONCOLOGY | Facility: CLINIC | Age: 28
End: 2023-07-27
Payer: COMMERCIAL

## 2023-08-16 ENCOUNTER — PATIENT MESSAGE (OUTPATIENT)
Dept: HEMATOLOGY/ONCOLOGY | Facility: CLINIC | Age: 28
End: 2023-08-16
Payer: COMMERCIAL

## 2023-08-16 DIAGNOSIS — I82.621 DEEP VEIN THROMBOSIS (DVT) OF ULNAR VEIN OF RIGHT UPPER EXTREMITY, UNSPECIFIED CHRONICITY: Primary | ICD-10-CM

## 2023-08-22 ENCOUNTER — HOSPITAL ENCOUNTER (OUTPATIENT)
Dept: RADIOLOGY | Facility: HOSPITAL | Age: 28
Discharge: HOME | End: 2023-08-22
Attending: INTERNAL MEDICINE
Payer: COMMERCIAL

## 2023-08-22 DIAGNOSIS — I82.621 DEEP VEIN THROMBOSIS (DVT) OF ULNAR VEIN OF RIGHT UPPER EXTREMITY, UNSPECIFIED CHRONICITY: ICD-10-CM

## 2023-08-22 PROCEDURE — 93971 US UPPER EXTREMITY VEINS RIGHT: ICD-10-PCS | Mod: 26,RT,, | Performed by: RADIOLOGY

## 2023-08-22 PROCEDURE — 93971 EXTREMITY STUDY: CPT | Mod: 26,RT,, | Performed by: RADIOLOGY

## 2023-08-22 PROCEDURE — 93971 EXTREMITY STUDY: CPT | Mod: TC,RT

## 2023-08-23 DIAGNOSIS — I82.621 DEEP VEIN THROMBOSIS (DVT) OF ULNAR VEIN OF RIGHT UPPER EXTREMITY, UNSPECIFIED CHRONICITY: Primary | ICD-10-CM

## 2023-08-23 DIAGNOSIS — I82.C11 INTERNAL JUGULAR (IJ) VEIN THROMBOEMBOLISM, ACUTE, RIGHT: ICD-10-CM

## 2023-08-24 ENCOUNTER — OFFICE VISIT (OUTPATIENT)
Dept: HEMATOLOGY/ONCOLOGY | Facility: CLINIC | Age: 28
End: 2023-08-24
Payer: COMMERCIAL

## 2023-08-24 ENCOUNTER — LAB VISIT (OUTPATIENT)
Dept: LAB | Facility: HOSPITAL | Age: 28
End: 2023-08-24
Attending: NURSE PRACTITIONER
Payer: COMMERCIAL

## 2023-08-24 VITALS
HEART RATE: 102 BPM | BODY MASS INDEX: 29.35 KG/M2 | SYSTOLIC BLOOD PRESSURE: 134 MMHG | WEIGHT: 205 LBS | DIASTOLIC BLOOD PRESSURE: 83 MMHG | TEMPERATURE: 99 F | HEIGHT: 70 IN | RESPIRATION RATE: 18 BRPM | OXYGEN SATURATION: 97 %

## 2023-08-24 DIAGNOSIS — T45.1X5A CHEMOTHERAPY INDUCED NEUTROPENIA: ICD-10-CM

## 2023-08-24 DIAGNOSIS — R93.89 ABNORMAL COMPUTED TOMOGRAPHY ANGIOGRAPHY (CTA): ICD-10-CM

## 2023-08-24 DIAGNOSIS — D64.81 ANTINEOPLASTIC CHEMOTHERAPY INDUCED ANEMIA: ICD-10-CM

## 2023-08-24 DIAGNOSIS — D70.1 CHEMOTHERAPY INDUCED NEUTROPENIA: ICD-10-CM

## 2023-08-24 DIAGNOSIS — I82.621 DEEP VEIN THROMBOSIS (DVT) OF ULNAR VEIN OF RIGHT UPPER EXTREMITY, UNSPECIFIED CHRONICITY: Primary | ICD-10-CM

## 2023-08-24 DIAGNOSIS — T45.1X5A ANTINEOPLASTIC CHEMOTHERAPY INDUCED ANEMIA: ICD-10-CM

## 2023-08-24 DIAGNOSIS — C62.11 MALIGNANT NEOPLASM OF DESCENDED RIGHT TESTIS: ICD-10-CM

## 2023-08-24 DIAGNOSIS — I82.621 DEEP VEIN THROMBOSIS (DVT) OF ULNAR VEIN OF RIGHT UPPER EXTREMITY, UNSPECIFIED CHRONICITY: ICD-10-CM

## 2023-08-24 LAB
ALBUMIN SERPL BCP-MCNC: 3.7 G/DL (ref 3.5–5.2)
ALP SERPL-CCNC: 55 U/L (ref 55–135)
ALT SERPL W/O P-5'-P-CCNC: 40 U/L (ref 10–44)
ANION GAP SERPL CALC-SCNC: 8 MMOL/L (ref 8–16)
AST SERPL-CCNC: 32 U/L (ref 10–40)
BILIRUB SERPL-MCNC: 0.3 MG/DL (ref 0.1–1)
BUN SERPL-MCNC: 17 MG/DL (ref 6–20)
CALCIUM SERPL-MCNC: 9.2 MG/DL (ref 8.7–10.5)
CHLORIDE SERPL-SCNC: 108 MMOL/L (ref 95–110)
CO2 SERPL-SCNC: 25 MMOL/L (ref 23–29)
CREAT SERPL-MCNC: 1.1 MG/DL (ref 0.5–1.4)
ERYTHROCYTE [DISTWIDTH] IN BLOOD BY AUTOMATED COUNT: 16.9 % (ref 11.5–14.5)
EST. GFR  (NO RACE VARIABLE): >60 ML/MIN/1.73 M^2
GLUCOSE SERPL-MCNC: 122 MG/DL (ref 70–110)
HCT VFR BLD AUTO: 30.5 % (ref 40–54)
HGB BLD-MCNC: 9.6 G/DL (ref 14–18)
IMM GRANULOCYTES # BLD AUTO: 0.02 K/UL (ref 0–0.04)
MCH RBC QN AUTO: 27.5 PG (ref 27–31)
MCHC RBC AUTO-ENTMCNC: 31.5 G/DL (ref 32–36)
MCV RBC AUTO: 87 FL (ref 82–98)
NEUTROPHILS # BLD AUTO: 3.6 K/UL (ref 1.8–7.7)
PLATELET # BLD AUTO: 206 K/UL (ref 150–450)
PMV BLD AUTO: 10.7 FL (ref 9.2–12.9)
POTASSIUM SERPL-SCNC: 3.6 MMOL/L (ref 3.5–5.1)
PROT SERPL-MCNC: 6.4 G/DL (ref 6–8.4)
RBC # BLD AUTO: 3.49 M/UL (ref 4.6–6.2)
SODIUM SERPL-SCNC: 141 MMOL/L (ref 136–145)
WBC # BLD AUTO: 6.33 K/UL (ref 3.9–12.7)

## 2023-08-24 PROCEDURE — 3044F HG A1C LEVEL LT 7.0%: CPT | Mod: CPTII,S$GLB,, | Performed by: NURSE PRACTITIONER

## 2023-08-24 PROCEDURE — 3008F BODY MASS INDEX DOCD: CPT | Mod: CPTII,S$GLB,, | Performed by: NURSE PRACTITIONER

## 2023-08-24 PROCEDURE — 1159F MED LIST DOCD IN RCRD: CPT | Mod: CPTII,S$GLB,, | Performed by: NURSE PRACTITIONER

## 2023-08-24 PROCEDURE — 3075F SYST BP GE 130 - 139MM HG: CPT | Mod: CPTII,S$GLB,, | Performed by: NURSE PRACTITIONER

## 2023-08-24 PROCEDURE — 3079F PR MOST RECENT DIASTOLIC BLOOD PRESSURE 80-89 MM HG: ICD-10-PCS | Mod: CPTII,S$GLB,, | Performed by: NURSE PRACTITIONER

## 2023-08-24 PROCEDURE — 99999 PR PBB SHADOW E&M-EST. PATIENT-LVL IV: CPT | Mod: PBBFAC,,, | Performed by: NURSE PRACTITIONER

## 2023-08-24 PROCEDURE — 3008F PR BODY MASS INDEX (BMI) DOCUMENTED: ICD-10-PCS | Mod: CPTII,S$GLB,, | Performed by: NURSE PRACTITIONER

## 2023-08-24 PROCEDURE — 80053 COMPREHEN METABOLIC PANEL: CPT | Performed by: NURSE PRACTITIONER

## 2023-08-24 PROCEDURE — 3079F DIAST BP 80-89 MM HG: CPT | Mod: CPTII,S$GLB,, | Performed by: NURSE PRACTITIONER

## 2023-08-24 PROCEDURE — 3075F PR MOST RECENT SYSTOLIC BLOOD PRESS GE 130-139MM HG: ICD-10-PCS | Mod: CPTII,S$GLB,, | Performed by: NURSE PRACTITIONER

## 2023-08-24 PROCEDURE — 85027 COMPLETE CBC AUTOMATED: CPT | Performed by: NURSE PRACTITIONER

## 2023-08-24 PROCEDURE — 99999 PR PBB SHADOW E&M-EST. PATIENT-LVL IV: ICD-10-PCS | Mod: PBBFAC,,, | Performed by: NURSE PRACTITIONER

## 2023-08-24 PROCEDURE — 3044F PR MOST RECENT HEMOGLOBIN A1C LEVEL <7.0%: ICD-10-PCS | Mod: CPTII,S$GLB,, | Performed by: NURSE PRACTITIONER

## 2023-08-24 PROCEDURE — 99215 OFFICE O/P EST HI 40 MIN: CPT | Mod: S$GLB,,, | Performed by: NURSE PRACTITIONER

## 2023-08-24 PROCEDURE — 1159F PR MEDICATION LIST DOCUMENTED IN MEDICAL RECORD: ICD-10-PCS | Mod: CPTII,S$GLB,, | Performed by: NURSE PRACTITIONER

## 2023-08-24 PROCEDURE — 99215 PR OFFICE/OUTPT VISIT, EST, LEVL V, 40-54 MIN: ICD-10-PCS | Mod: S$GLB,,, | Performed by: NURSE PRACTITIONER

## 2023-08-24 PROCEDURE — 36415 COLL VENOUS BLD VENIPUNCTURE: CPT | Performed by: NURSE PRACTITIONER

## 2023-08-24 NOTE — PROGRESS NOTES
Subjective     Patient ID: Arturo Salguero III is a 27 y.o. male.    Chief Complaint: Malignant neoplasm of descended right testis    HPI    Here for an urgent care follow up  In the interval,   7/21/2023 - Developed swelling in neck and arm. Dx with DVT.  He was started on anticoagulation.   Port was not removed in hospital.   Repeat Us 8/22/2023 reveals decreased clot burden.   Also, received C3D15 7/24/2023  He will be scheduled for port removal per IR    Today,   Feels good overall.   Right arm swelling has gone down but not resolved.   No other complaints or issues  Appetite good and weight stable.   No n/v  No urinary complaints  Bowels normal.         7/21/2023 Admission:   Hospital Course: Arturo Salguero III is a 28 yo man with Right testicular cancer who presents to the ED with complaint of right arm swelling and pain for the last two days. His vsymptoms started with some discomfort in his right neck and some swelling in his hands that he noticed. His arm continued to swell the following day and today which prompted him to present to the ED. He has tingling in the right hand and notes pain in the right shoulder and neck that worsens with movement of his arm. who presents to the ED with complaint of right arm swelling and pain for the last two days. In the ED, he arrived afebrile, tachycardic HR 120s, RR 20 and normotensive 132/89. Labs notable for D-Dimer 6.93; rest of labs unremarkable. CXR was unremarkable. CTA showed questionable RLL pulmonary embolism. RUE US showed Right IJ thrombus with possible involvement of the subclavian vein. Patient started on heparin infusion and was admitted to Medical Oncology for management and observation of acute symptomatic VTE. He is doing better and plan is to discharge the patient on oral anticoagulation.         Oncology History:  - noted right testicular swelling and went to his PCP     - 3/20/2023 Testicular Ultrasound:  FINDINGS:  Right Testicle:  *Size: 7.5 x 5.3 x 5.8  cm  *Appearance: Markedly heterogeneous echotexture.  *Flow: Normal arterial and venous flow  *Epididymis: Normal.  *Hydrocele: None.  *Varicocele: None.     Left Testicle:  *Size: 7.7 x 5.2 x 5.6 cm  *Appearance: Markedly heterogeneous echotexture.  *Flow: Normal arterial and venous flow  *Epididymis: Normal.  *Hydrocele: None.  *Varicocele: None.     Other findings: None.  Impression:  Bilateral testicular enlargement with markedly heterogeneous echotexture, concerning for infiltrative process such as lymphoma.  Correlation with prior history is recommended as sequela from bilateral remote orchitis may give a similar appearance.  Consultation with urology is recommended.  This report was flagged in Epic as abnormal.     - 3/21/2023 CT C/A/P:  FINDINGS:  BASE OF NECK: No significant abnormality.  Thyroid gland unremarkable.  THORACIC SOFT TISSUES: No axillary adenopathy.  AORTA: Thoracic aorta maintains normal caliber.  Left-sided aortic arch with normal three vessel branching pattern.  No calcific atherosclerosis of the thoracic aorta.  HEART: Normal size with physiologic pericardial fluid.  PULMONARY VASCULATURE: Unremarkable.  EMILY/MEDIASTINUM: No pathologic alexandra enlargement.  AIRWAYS: Trachea and proximal airways remain patent.  LUNGS/PLEURA: Lungs are symmetrically well expanded.  No focal consolidation, mass, pneumothorax, or pleural fluid.  LIVER: Normal in size and contour.  No focal hepatic lesion.  GALLBLADDER: No calcified gallstones.  BILE DUCTS: No evidence of dilated ducts.  PANCREAS: No mass or peripancreatic fat stranding.  SPLEEN: No splenomegaly.  ADRENALS: Unremarkable.  KIDNEYS/URETERS: Normal in size and location with normal enhancement.  No hydronephrosis or nephrolithiasis. No ureteral dilatation.  BLADDER: No evidence of wall thickening.  REPRODUCTIVE ORGANS: Heterogeneous 6.5 x 6.2 cm mass-like enlargement of right testicle, correlate with 03/20/2023 ultrasound.  STOMACH/DUODENUM:  Unremarkable.  BOWEL/MESENTERY: Small bowel is normal in caliber with no evidence of obstruction. No evidence of inflammation or wall thickening.  Colon demonstrates no focal wall thickening.  Appendix is visualized and appears normal.  PERITONEUM: No intraperitoneal free air or fluid.  LYMPH NODES: Enlarged 1.1 cm pericaval node (axial 3:99).  No pelvic or inguinal lymphadenopathy.  VASCULATURE: No significant calcific atherosclerosis.  Portal venous system is patent.  ABDOMINAL WALL:  Small fat containing umbilical hernia.  BONES: No acute fracture. No suspicious osseous lesions.  Impression:  1. Enlarged 1.1 cm pericaval lymph node.  No additional enlarged lymph nodes throughout the abdomen.  No pelvic or inguinal lymphadenopathy.  2. Heterogeneous 6.5 x 6.2 cm mass-like enlargement of right testicle, correlate with 03/20/2023 ultrasound.  3. Other findings above.  This report was flagged in Epic as abnormal.     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   - See CAP synoptic report below.   CASE SUMMARY: (TESTIS: Radical Orchiectomy)   Standard(s): AJCC-UICC 8   SPECIMEN   Specimen Laterality: Right.   Tumor Focality: Unifocal.   Tumor Size: Greatest dimension of main tumor mass in Centimeters (cm): 6.7 cm.   Histologic Type: Mixed germ cell tumor.   Embryonal carcinoma (specify percentage): 50 %   Yolk sac tumor, postpubertal type (specify percentage): 40 %   Choriocarcinoma (specify percentage): 10 %   Tumor Extent: Invades rete testis.   Lymphovascular Invasion: Present.   Margin Status: All margins negative for tumor.   Regional Lymph Node Status: Not applicable (no regional lymph nodes submitted or found).   Distant Site: Not applicable.   PATHOLOGIC STAGE CLASSIFICATION (pTNM, AJCC 8th Edition): pT2 pN not assigned (no nodes submitted or found).   Blocks for potential molecular or ancillary studies:   Tumor block: 1I or  1L.      S/p 3 cycles of BEP completed 7/24/2023    - 6/1/2023 MRI Abdomen:  FINDINGS:  Inferior Thorax: Bilateral gynecomastia.  Liver: Normal size and background parenchymal signal.  No suspicious lesion.  Hepatic and portal veins are patent.  Gallbladder: Unremarkable.  Bile Ducts: No dilatation.  Pancreas: No mass or ductal dilatation.  Spleen: Unremarkable.  Adrenals: Unremarkable.  Kidneys/Ureters: No mass or hydroureteronephrosis.  GI Tract/Mesentery: No evidence of bowel obstruction or inflammation.  Peritoneal Space: No ascites.  Retroperitoneum: Few prominent distal retroperitoneal lymph nodes, similar to prior PET-CT.  Distal aortocaval lymph node measures 1.3 cm (series [id number], image 75).  Abdominal wall: Unremarkable.  Vasculature: No aneurysm.  Bones: No suspicious marrow replacing lesion.  Impression:  1. No suspicious hepatic lesion.  2. Few prominent distal retroperitoneal lymph nodes, similar to prior PET-CT.  3. Additional findings as above.      6/14/2023 MRI T spine:  FINDINGS:  Alignment: Normal.  Vertebrae: Normal marrow signal. No fracture.  No enhancing osseous lesions.  Discs: Disc heights are maintained.  No evidence for discitis.  Cord: Normal morphology and signal.  Enhancement: No abnormal enhancement.  Degenerative findings: No spinal canal stenosis or neural foraminal narrowing at any level.  Paraspinal muscles & soft tissues: Unremarkable.  Impression:  1. Normal examination.  No evidence for metastatic disease.  Electronically signed by resident: Holden Adame  Date:                                            06/14/2023  Time:                                           16:23  Electronically signed by: Babak Cadena MD  Date:                                            06/14/2023  Time:                                           17:04      PMH:  None     FH:  Mother living at age 53 yo, healthy overall  Father, living at age 53 yo, healthy  1 brother- Type 1 DM- 25 yo  1 sister-  healthy  No major health issues known extended family     SH:  Apartment work, maintenance A/C  Good support system  No EtOH (Rare social)  No tobacco     Review of Systems  No changes    Review of Systems   Constitutional:         See Above   All other systems reviewed and are negative.       Objective     Physical Exam  Vitals and nursing note reviewed.   Constitutional:       General: He is not in acute distress.     Appearance: Normal appearance. He is well-developed and normal weight. He is not ill-appearing, toxic-appearing or diaphoretic.      Comments: Very pleasant   ECOG = 0  Presents with his mother and girlfriend   HENT:      Head: Normocephalic and atraumatic.      Right Ear: External ear normal.      Left Ear: External ear normal.      Nose: Nose normal. No congestion.      Mouth/Throat:      Pharynx: Oropharynx is clear. No oropharyngeal exudate.   Eyes:      General: No scleral icterus.     Extraocular Movements: Extraocular movements intact.      Conjunctiva/sclera: Conjunctivae normal.      Pupils: Pupils are equal, round, and reactive to light.   Neck:      Thyroid: No thyromegaly.      Trachea: No tracheal deviation.   Cardiovascular:      Rate and Rhythm: Normal rate and regular rhythm.      Heart sounds: Normal heart sounds. No murmur heard.     No friction rub. No gallop.   Pulmonary:      Effort: Pulmonary effort is normal. No respiratory distress.      Breath sounds: Normal breath sounds. No wheezing, rhonchi or rales.   Chest:      Chest wall: No tenderness.   Abdominal:      General: Bowel sounds are normal. There is no distension.      Palpations: Abdomen is soft. There is no mass.      Tenderness: There is no abdominal tenderness. There is no guarding or rebound.   Musculoskeletal:         General: Swelling (mild swelling to right arm) present. No tenderness. Normal range of motion.      Cervical back: Normal range of motion and neck supple.      Right lower leg: No edema.      Left  lower leg: No edema.   Lymphadenopathy:      Cervical: No cervical adenopathy.   Skin:     General: Skin is warm and dry.      Coloration: Skin is not jaundiced or pale.      Findings: No erythema, lesion or rash.      Comments: Faint mottled type pattern to lower back without changes   Neurological:      General: No focal deficit present.      Mental Status: He is alert and oriented to person, place, and time. Mental status is at baseline.      Sensory: No sensory deficit.      Motor: No weakness or abnormal muscle tone.      Coordination: Coordination normal.      Gait: Gait normal.      Deep Tendon Reflexes: Reflexes normal.   Psychiatric:         Mood and Affect: Mood normal.         Behavior: Behavior normal.         Thought Content: Thought content normal.         Judgment: Judgment normal.       Labs- reviewed     Assessment and Plan     1. Deep vein thrombosis (DVT) of ulnar vein of right upper extremity, unspecified chronicity  CBC Oncology    Comprehensive Metabolic Panel      2. Malignant neoplasm of descended right testis  CT Chest Abdomen Pelvis W W/O Contrast (XPD)      3. Chemotherapy induced neutropenia        4. Antineoplastic chemotherapy induced anemia        5. Abnormal computed tomography angiography (CTA)  CT Chest Abdomen Pelvis W W/O Contrast (XPD)                Testicular cancer-  Completed 3 cycles of BEP on 7/24/2023  imaging 8-10 weeks post chemo    Anemia-  Check parameters today  No signs of bleeding, platelets normal.     DVT-   Repeat Us reviewed.   Port removal asap  Ok to stop anticoagulant once removed.     Abnormal CTA  -repeat ct chest with imaging    Labs today- cbc, cmp  markers in October as scheduled.       Med Onc Chart Routing      Follow up with physician . 10/3 as scheduled.   Follow up with [facility]    Infusion scheduling note    Injection scheduling note    Labs    Imaging   CT chest/abd/pelvis couple of days before 10/3 appt.   Pharmacy appointment    Other referrals           Patient is in agreement with the proposed treatment plan. All questions were answered to the patient's satisfaction. Pt knows to call clinic for any new or worsening symptoms and if anything is needed before the next clinic visit.    Wes Reddy, MSN, APRN, FNP-C  Nurse Practitioner to Dr. Zenobia Alston  [facility]  [facility]  Hematology & Medical Oncology  [address] [place] [id number]  ph. [phone] ext [phone]  Fax. [phone]              40 minutes of total time spent on the encounter, which includes face to face time and non-face to face time preparing to see the patient (eg, review of tests), Obtaining and/or reviewing separately obtained history, Documenting clinical information in the electronic or other health record, Independently interpreting results (not separately reported) and communicating results to the patient/family/caregiver, or Care coordination (not separately reported).

## 2023-08-28 DIAGNOSIS — C62.11 MALIGNANT NEOPLASM OF DESCENDED RIGHT TESTIS: Primary | ICD-10-CM

## 2023-08-29 ENCOUNTER — TELEPHONE (OUTPATIENT)
Dept: INTERVENTIONAL RADIOLOGY/VASCULAR | Facility: HOSPITAL | Age: 28
End: 2023-08-29

## 2023-08-30 ENCOUNTER — LAB VISIT (OUTPATIENT)
Dept: LAB | Facility: HOSPITAL | Age: 28
End: 2023-08-30
Attending: PHYSICIAN ASSISTANT
Payer: COMMERCIAL

## 2023-08-30 DIAGNOSIS — C62.11 MALIGNANT NEOPLASM OF DESCENDED RIGHT TESTIS: ICD-10-CM

## 2023-08-30 LAB
INR PPP: 0.9 (ref 0.8–1.2)
PROTHROMBIN TIME: 10.2 SEC (ref 9–12.5)

## 2023-08-30 PROCEDURE — 85610 PROTHROMBIN TIME: CPT | Performed by: PHYSICIAN ASSISTANT

## 2023-08-30 PROCEDURE — 36415 COLL VENOUS BLD VENIPUNCTURE: CPT | Performed by: PHYSICIAN ASSISTANT

## 2023-08-31 ENCOUNTER — HOSPITAL ENCOUNTER (OUTPATIENT)
Dept: INTERVENTIONAL RADIOLOGY/VASCULAR | Facility: HOSPITAL | Age: 28
Discharge: HOME | End: 2023-08-31
Attending: STUDENT IN AN ORGANIZED HEALTH CARE EDUCATION/TRAINING PROGRAM
Payer: COMMERCIAL

## 2023-08-31 VITALS
HEART RATE: 74 BPM | SYSTOLIC BLOOD PRESSURE: 132 MMHG | DIASTOLIC BLOOD PRESSURE: 70 MMHG | WEIGHT: 205 LBS | RESPIRATION RATE: 18 BRPM | OXYGEN SATURATION: 100 % | TEMPERATURE: 98 F | HEIGHT: 70 IN | BODY MASS INDEX: 29.35 KG/M2

## 2023-08-31 DIAGNOSIS — I82.621 DEEP VEIN THROMBOSIS (DVT) OF ULNAR VEIN OF RIGHT UPPER EXTREMITY, UNSPECIFIED CHRONICITY: ICD-10-CM

## 2023-08-31 DIAGNOSIS — I82.621: ICD-10-CM

## 2023-08-31 PROCEDURE — 36590 REMOVAL TUNNELED CV CATH: CPT | Mod: RT,,, | Performed by: STUDENT IN AN ORGANIZED HEALTH CARE EDUCATION/TRAINING PROGRAM

## 2023-08-31 PROCEDURE — 36590 REMOVAL TUNNELED CV CATH: CPT

## 2023-08-31 PROCEDURE — 25000003 PHARM REV CODE 250: Performed by: STUDENT IN AN ORGANIZED HEALTH CARE EDUCATION/TRAINING PROGRAM

## 2023-08-31 PROCEDURE — 63600175 PHARM REV CODE 636 W HCPCS: Performed by: STUDENT IN AN ORGANIZED HEALTH CARE EDUCATION/TRAINING PROGRAM

## 2023-08-31 PROCEDURE — 36590 IR TUNNELED CATH REMOVAL W/PORT: ICD-10-PCS | Mod: RT,,, | Performed by: STUDENT IN AN ORGANIZED HEALTH CARE EDUCATION/TRAINING PROGRAM

## 2023-08-31 PROCEDURE — 99152 MOD SED SAME PHYS/QHP 5/>YRS: CPT | Mod: ,,, | Performed by: STUDENT IN AN ORGANIZED HEALTH CARE EDUCATION/TRAINING PROGRAM

## 2023-08-31 PROCEDURE — 99152 MOD SED SAME PHYS/QHP 5/>YRS: CPT

## 2023-08-31 PROCEDURE — 99152 PR MOD CONSCIOUS SEDATION, SAME PHYS, 5+ YRS, FIRST 15 MIN: ICD-10-PCS | Mod: ,,, | Performed by: STUDENT IN AN ORGANIZED HEALTH CARE EDUCATION/TRAINING PROGRAM

## 2023-08-31 RX ORDER — MIDAZOLAM HYDROCHLORIDE 1 MG/ML
INJECTION INTRAMUSCULAR; INTRAVENOUS
Status: COMPLETED | OUTPATIENT
Start: 2023-08-31 | End: 2023-08-31

## 2023-08-31 RX ORDER — LIDOCAINE HYDROCHLORIDE 20 MG/ML
INJECTION, SOLUTION INFILTRATION; PERINEURAL
Status: COMPLETED | OUTPATIENT
Start: 2023-08-31 | End: 2023-08-31

## 2023-08-31 RX ORDER — FENTANYL CITRATE 50 UG/ML
INJECTION, SOLUTION INTRAMUSCULAR; INTRAVENOUS
Status: COMPLETED | OUTPATIENT
Start: 2023-08-31 | End: 2023-08-31

## 2023-08-31 RX ADMIN — MIDAZOLAM HYDROCHLORIDE 1 MG: 1 INJECTION INTRAMUSCULAR; INTRAVENOUS at 02:08

## 2023-08-31 RX ADMIN — LIDOCAINE HYDROCHLORIDE 10 ML: 20 INJECTION, SOLUTION INFILTRATION; PERINEURAL at 02:08

## 2023-08-31 RX ADMIN — FENTANYL CITRATE 50 MCG: 50 INJECTION, SOLUTION INTRAMUSCULAR; INTRAVENOUS at 02:08

## 2023-08-31 NOTE — BRIEF OP NOTE
Radiology Post-Procedure Note    Pre Op Diagnosis: testicular cancer    Post Op Diagnosis: same    Procedure: port removal     Procedure performed by: Madeline Ron MD    Written Informed Consent Obtained: Yes    Specimen Removed: YES port    Estimated Blood Loss: less than 50     Findings:   Removal of right IJ port.     Patient tolerated procedure well.    Madeline Ron MD  Interventional Radiology

## 2023-08-31 NOTE — DISCHARGE INSTRUCTIONS
Port Removal:    Port Removal Instructions:         Remove tegaderm dressing and gauze on next day after procedure.         Do not remove steri strips that is below dressing, resembles paper tape, allow these to fall off on own.         Try to keep dressing dry and intact.         No submerging under water in pool or bathtub until incision is completely healed.         No heavy lifting, greater than 25 pounds, especially overhead for approximately 1 week.

## 2023-08-31 NOTE — H&P
Interventional Radiology Pre-Procedure History & Physical      Chief Complaint/Reason for Referral: testicular cancer    History of Present Illness:  Arturo Salguero III is a 27 y.o. male who presents for port removal. No acute complaints.     History reviewed. No pertinent past medical history.  Past Surgical History:   Procedure Laterality Date    ORCHIECTOMY, RADICAL, INGUINAL APPROACH Right 3/31/2023    Procedure: ORCHIECTOMY, RADICAL, INGUINAL APPROACH;  Surgeon: Perico Shah MD;  Location: [facility] OR [address] [place] [address];  Service: Urology;  Laterality: Right;  90 minutes       Allergies:   Review of patient's allergies indicates:   Allergen Reactions    Amoxicillin         Home Meds:   Prior to Admission medications    Medication Sig Start Date End Date Taking? Authorizing Provider   ALPRAZolam (XANAX) 0.25 MG tablet Take 1 tablet (0.25 mg total) by mouth 3 (three) times daily as needed for Anxiety (nausea). 6/7/23 6/6/24  Zenobia Alston MD   apixaban (ELIQUIS DVT-PE TREAT 30D START) 5 mg (74 tabs) DsPk For the first 7 days take two 5 mg tablets twice daily.  After 7 days take one 5 mg tablet twice daily. 7/21/23   Torey Morales MD   apixaban (ELIQUIS) 5 mg Tab Take 1 tablet (5 mg total) by mouth 2 (two) times daily. 8/23/23   Zenobia Alston MD   dexAMETHasone (DECADRON) 4 MG Tab Take 1 tablet (4 mg total) by mouth every 12 (twelve) hours as needed (take every 12 hours for 3 days following the chemotherapy with cisplatin) 5/18/23   Heaven Yepez, CNS   LIDOcaine-prilocaine (EMLA) cream Apply topically as needed (apply to site 45-60 minutes prior to port access). 6/22/23   Heaven Yepez, CNS   OLANZapine (ZYPREXA) 5 MG tablet Take 1 tablet by mouth every night x 7 days starting day 1 of each chemo cycle or as otherwise stated 6/19/23   Wes Reddy NP   ondansetron (ZOFRAN) 8 MG tablet Take 1 tablet (8 mg total) by mouth every 12 (twelve) hours as needed for Nausea. 6/13/23 6/12/24  Zenobia Alston MD    promethazine (PHENERGAN) 25 MG tablet Take 1 tablet (25 mg total) by mouth every 6 (six) hours as needed for Nausea. (For breakthrough nausea if zofran is not working) 5/18/23   Heaven Yepez, CNS       Anticoagulation/Antiplatelet Meds:  Eliquis  has been held for 4 days    Review of Systems:   Hematological: no known coagulopathies  Respiratory: no shortness of breath  Cardiovascular: no chest pain  Gastrointestinal: no abdominal pain  Genitourinary: no dysuria  Musculoskeletal: negative  Neurological: no TIA or stroke symptoms     Physical Exam:  Temp: 98.2 °F (36.8 °C) (08/31/23 1239)  Pulse: 81 (08/31/23 1239)  Resp: 16 (08/31/23 1239)  BP: 129/78 (08/31/23 1239)  SpO2: 99 % (08/31/23 1239)    General: WNWD, NAD  HEENT: Normocephalic, sclera anicteric, oropharynx clear  Neck: Supple, no palpable lymphadenopathy  Heart: RRR  Lungs:  breathing unlabored  Abd: NTND, soft  Extremities:  no CCE on exposed skin  Neuro: Gross nonfocal    Laboratory:  Lab Results   Component Value Date    INR 0.9 08/30/2023       Lab Results   Component Value Date    WBC 6.33 08/24/2023    HGB 9.6 (L) 08/24/2023    HCT 30.5 (L) 08/24/2023    MCV 87 08/24/2023     08/24/2023      Lab Results   Component Value Date     (H) 08/24/2023     08/24/2023    K 3.6 08/24/2023     08/24/2023    CO2 25 08/24/2023    BUN 17 08/24/2023    CREATININE 1.1 08/24/2023    CALCIUM 9.2 08/24/2023    MG 1.4 (L) 07/21/2023    ALT 40 08/24/2023    AST 32 08/24/2023    ALBUMIN 3.7 08/24/2023    BILITOT 0.3 08/24/2023       Imaging:  CT chest was reviewed.    Assessment/Plan:  27 y.o. male with testicular cancer. Will undergo port removal today.    Sedation:  Sedation history: have not been any systemic reactions  ASA: 2 / Mallampati: 2  Sedation plan: Up to moderate (Versed, fentanyl)     Risks (including, but not limited to, pain, bleeding, infection, damage to nearby structures, treatment failure/recurrence, and the need for  additional procedures), potential benefits, and alternatives were discussed with the patient. All questions were answered to the best of my abilities. The patient wishes to proceed. Written informed consent was obtained.      Madeline Ron MD

## 2023-08-31 NOTE — PLAN OF CARE
Chart reviewed. Preop nursing care completed per orders. Safe surgery checklist complete. Pt denies any open wounds cuts or sores. Pt denies any metal in body. Family at bedside and belongings placed in PACU locker. Waiting for H&P and surgical consents prior to surgery. Pt AAOX4, VSS on room air. Pt toileted, Bed locked in lowest position, Call light within reach. Pt denies any needs at this time.

## 2023-08-31 NOTE — Clinical Note
Right: Chest.   Scrubbed with Chlorohexidine.   Painted with Chlorohexidine.  Hair: N/A.  Skin prep dry before draping.  Prepped by: Cassidy Alvarado RT 8/31/2023 2:43 PM.

## 2023-09-23 ENCOUNTER — PATIENT MESSAGE (OUTPATIENT)
Dept: ADMINISTRATIVE | Facility: OTHER | Age: 28
End: 2023-09-23
Payer: COMMERCIAL

## 2023-09-27 ENCOUNTER — HOSPITAL ENCOUNTER (OUTPATIENT)
Dept: RADIOLOGY | Facility: HOSPITAL | Age: 28
Discharge: HOME | End: 2023-09-27
Attending: NURSE PRACTITIONER
Payer: COMMERCIAL

## 2023-09-27 DIAGNOSIS — R93.89 ABNORMAL COMPUTED TOMOGRAPHY ANGIOGRAPHY (CTA): ICD-10-CM

## 2023-09-27 DIAGNOSIS — C62.11 MALIGNANT NEOPLASM OF DESCENDED RIGHT TESTIS: ICD-10-CM

## 2023-09-27 PROCEDURE — 25500020 PHARM REV CODE 255: Performed by: NURSE PRACTITIONER

## 2023-09-27 PROCEDURE — 71260 CT THORAX DX C+: CPT | Mod: TC

## 2023-09-27 PROCEDURE — A9698 NON-RAD CONTRAST MATERIALNOC: HCPCS | Performed by: NURSE PRACTITIONER

## 2023-09-27 PROCEDURE — 74177 CT ABD & PELVIS W/CONTRAST: CPT | Mod: 26,,, | Performed by: INTERNAL MEDICINE

## 2023-09-27 PROCEDURE — 71260 CT THORAX DX C+: CPT | Mod: 26,,, | Performed by: INTERNAL MEDICINE

## 2023-09-27 PROCEDURE — 71260 CT CHEST ABDOMEN PELVIS WITH CONTRAST (XPD): ICD-10-PCS | Mod: 26,,, | Performed by: INTERNAL MEDICINE

## 2023-09-27 PROCEDURE — 74177 CT ABD & PELVIS W/CONTRAST: CPT | Mod: TC

## 2023-09-27 PROCEDURE — 74177 CT CHEST ABDOMEN PELVIS WITH CONTRAST (XPD): ICD-10-PCS | Mod: 26,,, | Performed by: INTERNAL MEDICINE

## 2023-09-27 RX ADMIN — Medication 450 ML: at 02:09

## 2023-09-27 RX ADMIN — IOHEXOL 100 ML: 350 INJECTION, SOLUTION INTRAVENOUS at 02:09

## 2023-10-02 NOTE — PROGRESS NOTES
Subjective     Patient ID: Arturo Salguero III is a 27 y.o. male.    Chief Complaint: Malignant neoplasm of descended right testis    HPI    Presents for follow up and review of surveillance scans.  Today, he reports feeling back to normal  Denies weight loss   Energy level back to normal and working full time  Denies pain     - 9/27/2023 CT C/A/P:  FINDINGS:  LINES/TUBES:  Postoperative changes in the right chest wall from a previous Port-A-Cath.  SOFT TISSUES: Bilateral gynecomastia.  No axillary or subpectoral lymphadenopathy. The visualized thyroid gland is unremarkable.  HEART AND MEDIASTINUM: No mediastinal or hilar lymphadenopathy. Heart and pericardium are within normal limits. Left-sided aortic arch. The main pulmonary artery is normal in size.  PLEURA: No pleural effusion or pneumothorax.  LUNGS AND AIRWAYS: Airways are patent. Lungs are unremarkable with no focal abnormality demonstrated.  HEPATOBILIARY: No focal hepatic lesions. No biliary ductal dilatation. Normal gallbladder.  SPLEEN: No splenomegaly.  PANCREAS: No focal masses or ductal dilatation.  ADRENALS: No adrenal nodules.  KIDNEYS/URETERS: No hydronephrosis, stones, or solid mass lesions.  PELVIC ORGANS/BLADDER: Bladder and prostate are unremarkable.  Partially imaged postoperative changes from right orchiectomy placement of a testicular prosthesis.  PERITONEUM / RETROPERITONEUM: No free air or fluid.  LYMPH NODES: No pelvic lymphadenopathy.  There are para-aortic lymph nodes measuring up to 1.4 cm (images 95 and 102 of series 3), which have increased in size from 1.1 cm previously but demonstrate new central hypoenhancement.  VESSELS: Unremarkable.  GI TRACT: No distention or wall thickening. Normal appendix.  BONES: No fractures or focal osseous lesions.  Impression:  History of testicular cancer status post right orchiectomy.  Two enlarged para-aortic lymph nodes, which have increased in size from 03/23/2023 but demonstrate new central  hypoenhancement.  These findings could represent progression of disease or treatment response.  No new metastases.    Oncology History:  - noted non painful right testicular swelling and went to his PCP     - 3/20/2023 Testicular Ultrasound:  FINDINGS:  Right Testicle:  *Size: 7.5 x 5.3 x 5.8 cm  *Appearance: Markedly heterogeneous echotexture.  *Flow: Normal arterial and venous flow  *Epididymis: Normal.  *Hydrocele: None.  *Varicocele: None.  Left Testicle:  *Size: 7.7 x 5.2 x 5.6 cm  *Appearance: Markedly heterogeneous echotexture.  *Flow: Normal arterial and venous flow  *Epididymis: Normal.  *Hydrocele: None.  *Varicocele: None.  Other findings: None.  Impression:  Bilateral testicular enlargement with markedly heterogeneous echotexture, concerning for infiltrative process such as lymphoma.  Correlation with prior history is recommended as sequela from bilateral remote orchitis may give a similar appearance.  Consultation with urology is recommended.  This report was flagged in Epic as abnormal.     - 3/21/2023 CT C/A/P:  FINDINGS:  BASE OF NECK: No significant abnormality.  Thyroid gland unremarkable.  THORACIC SOFT TISSUES: No axillary adenopathy.  AORTA: Thoracic aorta maintains normal caliber.  Left-sided aortic arch with normal three vessel branching pattern.  No calcific atherosclerosis of the thoracic aorta.  HEART: Normal size with physiologic pericardial fluid.  PULMONARY VASCULATURE: Unremarkable.  EMILY/MEDIASTINUM: No pathologic alexandra enlargement.  AIRWAYS: Trachea and proximal airways remain patent.  LUNGS/PLEURA: Lungs are symmetrically well expanded.  No focal consolidation, mass, pneumothorax, or pleural fluid.  LIVER: Normal in size and contour.  No focal hepatic lesion.  GALLBLADDER: No calcified gallstones.  BILE DUCTS: No evidence of dilated ducts.  PANCREAS: No mass or peripancreatic fat stranding.  SPLEEN: No splenomegaly.  ADRENALS: Unremarkable.  KIDNEYS/URETERS: Normal in size and  location with normal enhancement.  No hydronephrosis or nephrolithiasis. No ureteral dilatation.  BLADDER: No evidence of wall thickening.  REPRODUCTIVE ORGANS: Heterogeneous 6.5 x 6.2 cm mass-like enlargement of right testicle, correlate with 03/20/2023 ultrasound.  STOMACH/DUODENUM: Unremarkable.  BOWEL/MESENTERY: Small bowel is normal in caliber with no evidence of obstruction. No evidence of inflammation or wall thickening.  Colon demonstrates no focal wall thickening.  Appendix is visualized and appears normal.  PERITONEUM: No intraperitoneal free air or fluid.  LYMPH NODES: Enlarged 1.1 cm pericaval node (axial 3:99).  No pelvic or inguinal lymphadenopathy.  VASCULATURE: No significant calcific atherosclerosis.  Portal venous system is patent.  ABDOMINAL WALL:  Small fat containing umbilical hernia.  BONES: No acute fracture. No suspicious osseous lesions.  Impression:  1. Enlarged 1.1 cm pericaval lymph node.  No additional enlarged lymph nodes throughout the abdomen.  No pelvic or inguinal lymphadenopathy.  2. Heterogeneous 6.5 x 6.2 cm mass-like enlargement of right testicle, correlate with 03/20/2023 ultrasound.  3. Other findings above.  This report was flagged in Epic as abnormal.     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   - See [facility] synoptic report below.   CASE SUMMARY: (TESTIS: Radical Orchiectomy)   Standard(s): AJCC-UICC 8   SPECIMEN   Specimen Laterality: Right.   Tumor Focality: Unifocal.   Tumor Size: Greatest dimension of main tumor mass in Centimeters (cm): 6.7 cm.   Histologic Type: Mixed germ cell tumor.   Embryonal carcinoma (specify percentage): 50 %   Yolk sac tumor, postpubertal type (specify percentage): 40 %   Choriocarcinoma (specify percentage): 10 %   Tumor Extent: Invades rete testis.   Lymphovascular Invasion: Present.   Margin Status: All margins negative for tumor.   Regional Lymph Node  Status: Not applicable (no regional lymph nodes submitted or found).   Distant Site: Not applicable.   PATHOLOGIC STAGE CLASSIFICATION (pTNM, AJCC 8th Edition): pT2 pN not assigned (no nodes submitted or found).      - 5/8/2023 PET scan:  FINDINGS:  Quality of the study: Adequate.  In the head and neck, there are no hypermetabolic lesions worrisome for malignancy. There are no hypermetabolic mucosal lesions, and there are no pathologically enlarged or hypermetabolic lymph nodes.  In the chest, there are no hypermetabolic lesions worrisome for malignancy.  There are no concerning pulmonary nodules or masses, and there are no pathologically enlarged or hypermetabolic lymph nodes.  Bilateral gynecomastia  In the abdomen and pelvis, there is physiologic tracer distribution within the abdominal organs and excretion into the genitourinary system.  There are multiple prominent hypermetabolic retroperitoneal lymph nodes.  For example, 0.9 cm retrocaval nodes with SUV max 5.1 (axial fused image 165).  1.3 cm aortocaval lymph node at the level of the umbilicus with SUV max 4.9 (axial fused image 173).  0.8 cm right common iliac node with an SUV of 5.0 on image 195.  Postsurgical changes of bilateral orchiectomy with mild postsurgical uptake in the scrotum.  Circumferential bladder wall thickening, likely due to nondistention.  There is a questionable hypermetabolic focus in the posterior right hepatic lobe with a maximum SUV of 4.2 but no correlate on noncontrast CT image 119.  In the bones, there are no definite hypermetabolic lesions worrisome for malignancy.  There is a hypermetabolic focus with an SUV of 3.8 in the T8 vertebral body without CT correlate on image 107.  In the extremities, there are no hypermetabolic lesions worrisome for malignancy.  Impression:  1.  In this patient with testicular cancer status post bilateral orchiectomy, there are multiple prominent hypermetabolic pelvic and retroperitoneal lymph nodes.   These findings are concerning for metastatic disease.  2.  Questionable hypermetabolic foci in liver and bone may indicate additional metastases.  If management would change, recommend further evaluation with MRI.    - 6/1/2023 MRI Abdomen:  FINDINGS:  Inferior Thorax: Bilateral gynecomastia.  Liver: Normal size and background parenchymal signal.  No suspicious lesion.  Hepatic and portal veins are patent.  Gallbladder: Unremarkable.  Bile Ducts: No dilatation.  Pancreas: No mass or ductal dilatation.  Spleen: Unremarkable.  Adrenals: Unremarkable.  Kidneys/Ureters: No mass or hydroureteronephrosis.  GI Tract/Mesentery: No evidence of bowel obstruction or inflammation.  Peritoneal Space: No ascites.  Retroperitoneum: Few prominent distal retroperitoneal lymph nodes, similar to prior PET-CT.  Distal aortocaval lymph node measures 1.3 cm (series 82338, image 75).  Abdominal wall: Unremarkable.  Vasculature: No aneurysm.  Bones: No suspicious marrow replacing lesion.  Impression:  1. No suspicious hepatic lesion.  2. Few prominent distal retroperitoneal lymph nodes, similar to prior PET-CT.  3. Additional findings as above.     6/14/2023 MRI T spine:  FINDINGS:  Alignment: Normal.  Vertebrae: Normal marrow signal. No fracture.  No enhancing osseous lesions.  Discs: Disc heights are maintained.  No evidence for discitis.  Cord: Normal morphology and signal.  Enhancement: No abnormal enhancement.  Degenerative findings: No spinal canal stenosis or neural foraminal narrowing at any level.  Paraspinal muscles & soft tissues: Unremarkable.  Impression:  1. Normal examination.  No evidence for metastatic disease.    - s/p 3 cycles of BEP completed 7/24/2023     PMH:  None     FH:  Mother living, healthy   Father, living, healthy  1 brother- Type 1 DM  1 sister- healthy  No major health issues known extended family     SH:  Apartment work, maintenance A/C  Good support system  No EtOH (Rare social)  No tobacco    Review of  Systems   Constitutional:  Negative for activity change, appetite change, chills, fatigue, fever and unexpected weight change.        See Above   HENT:  Negative for dental problem, hearing loss, mouth sores, postnasal drip, rhinorrhea, sinus pressure/congestion, sore throat, tinnitus, trouble swallowing and voice change.    Eyes:  Negative for visual disturbance.   Respiratory:  Negative for cough, shortness of breath and wheezing.    Cardiovascular:  Negative for chest pain, palpitations and leg swelling.   Gastrointestinal:  Positive for nausea. Negative for abdominal distention, abdominal pain, blood in stool, constipation, diarrhea and vomiting.   Endocrine: Negative for cold intolerance and heat intolerance.   Genitourinary:  Negative for bladder incontinence, decreased urine volume, difficulty urinating, dysuria, frequency, hematuria and urgency.   Musculoskeletal:  Negative for arthralgias, back pain, gait problem, joint swelling, myalgias, neck pain and neck stiffness.   Integumentary:  Negative for color change, pallor, rash and wound.   Neurological:  Negative for dizziness, facial asymmetry, weakness, light-headedness, numbness, headaches and coordination difficulties.   Hematological:  Negative for adenopathy. Does not bruise/bleed easily.   Psychiatric/Behavioral:  Negative for agitation, behavioral problems, confusion, decreased concentration, dysphoric mood and sleep disturbance. The patient is not nervous/anxious.           Objective     Physical Exam  Vitals and nursing note reviewed.   Constitutional:       General: He is not in acute distress.     Appearance: Normal appearance. He is well-developed and normal weight. He is not ill-appearing.      Comments: Presents with mom and girlfriend  ECOG=0   HENT:      Head: Normocephalic and atraumatic.   Eyes:      General: No scleral icterus.     Extraocular Movements: Extraocular movements intact.      Conjunctiva/sclera: Conjunctivae normal.       Pupils: Pupils are equal, round, and reactive to light.   Neck:      Thyroid: No thyromegaly.      Trachea: No tracheal deviation.   Cardiovascular:      Rate and Rhythm: Normal rate and regular rhythm.      Heart sounds: Normal heart sounds. No murmur heard.     No friction rub. No gallop.   Pulmonary:      Effort: Pulmonary effort is normal. No respiratory distress.      Breath sounds: Normal breath sounds. No wheezing, rhonchi or rales.   Chest:      Chest wall: No tenderness.   Abdominal:      General: Bowel sounds are normal. There is no distension.      Palpations: Abdomen is soft. There is no mass.      Tenderness: There is no abdominal tenderness. There is no guarding or rebound.   Genitourinary:     Comments: Normal left testicle  Musculoskeletal:         General: No swelling or tenderness. Normal range of motion.      Cervical back: Normal range of motion and neck supple.      Right lower leg: No edema.      Left lower leg: No edema.   Lymphadenopathy:      Cervical: No cervical adenopathy.   Skin:     General: Skin is warm and dry.      Coloration: Skin is not jaundiced or pale.      Findings: Rash (folliculitis (note patient shaves)) present. No erythema or lesion.   Neurological:      General: No focal deficit present.      Mental Status: He is alert and oriented to person, place, and time. Mental status is at baseline.      Sensory: No sensory deficit.      Motor: No weakness or abnormal muscle tone.      Coordination: Coordination normal.      Gait: Gait normal.      Deep Tendon Reflexes: Reflexes are normal and symmetric. Reflexes normal.   Psychiatric:         Mood and Affect: Mood normal.         Behavior: Behavior normal.         Thought Content: Thought content normal.         Judgment: Judgment normal.   Imaging- reviewed  Labs- reviewed       Assessment and Plan     1. Folliculitis  -     doxycycline (VIBRA-TABS) 100 MG tablet; Take 1 tablet (100 mg total) by mouth 2 (two) times daily.   Dispense: 10 each; Refill: 0    2. Malignant neoplasm of descended right testis      Folliculitis - prescription sent    Testicular cancer  Tumor markers within normal limits  Equivocal CT scan- we discussed PET scan to further evaluate as radiology feels LN findings may indicate treatment response  RTC post    25 minutes total    Route Chart for Scheduling    Med Onc Chart Routing  Urgent    Follow up with physician . Note this is new follow up: needs EP in approx 10 weeks with cbc, cmp, bhcg, afp, ldh and scans prior   Follow up with [facility]REG[facility]    Infusion scheduling note    Injection scheduling note    Labs    Imaging    Pharmacy appointment    Other referrals              Treatment Plan Information   OP TESTICULAR BEP 5-DAY Q3W   Zenobia Alston MD   Upcoming Treatment Dates - OP TESTICULAR BEP 5-DAY Q3W    No upcoming days in selected categories.    Addendum: Additional imaging tests completed after CT recommendations    10/12/2023 PET scan:  FINDINGS:  Quality of the study: Adequate.  In the head and neck, there are no hypermetabolic lesions worrisome for malignancy. There are no hypermetabolic mucosal lesions, and there are no pathologically enlarged or hypermetabolic lymph nodes.  In the chest, there are no hypermetabolic lesions worrisome for malignancy.  There are no concerning pulmonary nodules or masses, and there are no pathologically enlarged or hypermetabolic lymph nodes.  In the abdomen and pelvis, there is interval decreased tracer uptake of previous hypermetabolic retroperitoneal and right iliac chain lymph nodes, now with uptake similar to background.  For example: 1.3 cm right para-aortic lymph node with SUV max of 1.8 (series 3, image 168).  Previously measured 1.3 cm with SUV max of 4.9.  Previously described question hypermetabolic focus in the posterior right hepatic lobe is no longer visualized.  Postsurgical changes status post bilateral orchiectomy noting nonspecific tracer uptake in the left  aspect of the scrotum, unchanged.  There is physiologic tracer distribution within the abdominal organs and excretion into the genitourinary system.  In the bones, there is a new focus of increased tracer uptake in the right aspect of the manubrium with SUV max of 4.3 on image 57.  No definite CT correlate.  No tracer avid osseous lesion elsewhere.  Previously described hypermetabolic focus in the T8 vertebral body is no longer visualized.  In the extremities, there are no hypermetabolic lesions worrisome for malignancy.  Impression:  Mixed interval changes in this patient with testicular cancer status post bilateral orchiectomy.  New tracer avid focus in the manubrium suspicious for osseous metastatic focus. Decreased tracer uptake within retroperitoneal and pelvic lymph nodes, now with uptake similar to background.    - 10/16/2023 MRI Chest:  FINDINGS:  Marrow signal within the osseous structures is normal.  Particular attention given to the area of increased radiotracer uptake involving the sternal aspect of the right sternoclavicular joint.  No abnormal marrow signal or enhancement is seen in this location.  The soft tissue structures within the field of view have a benign appearance.  Impression:  No evidence of osseous metastatic disease involving the manubrium of the sternum.

## 2023-10-03 ENCOUNTER — LAB VISIT (OUTPATIENT)
Dept: LAB | Facility: HOSPITAL | Age: 28
End: 2023-10-03
Attending: REGISTERED NURSE
Payer: COMMERCIAL

## 2023-10-03 ENCOUNTER — OFFICE VISIT (OUTPATIENT)
Dept: HEMATOLOGY/ONCOLOGY | Facility: CLINIC | Age: 28
End: 2023-10-03
Payer: COMMERCIAL

## 2023-10-03 VITALS
WEIGHT: 208.75 LBS | HEART RATE: 84 BPM | HEIGHT: 70 IN | OXYGEN SATURATION: 99 % | DIASTOLIC BLOOD PRESSURE: 57 MMHG | TEMPERATURE: 97 F | BODY MASS INDEX: 29.89 KG/M2 | RESPIRATION RATE: 17 BRPM | SYSTOLIC BLOOD PRESSURE: 132 MMHG

## 2023-10-03 DIAGNOSIS — L73.9 FOLLICULITIS: Primary | ICD-10-CM

## 2023-10-03 DIAGNOSIS — C62.11 MALIGNANT NEOPLASM OF DESCENDED RIGHT TESTIS: ICD-10-CM

## 2023-10-03 LAB
AFP SERPL-MCNC: 2.8 NG/ML (ref 0–8.4)
ALBUMIN SERPL BCP-MCNC: 3.8 G/DL (ref 3.5–5.2)
ALP SERPL-CCNC: 56 U/L (ref 55–135)
ALT SERPL W/O P-5'-P-CCNC: 44 U/L (ref 10–44)
ANION GAP SERPL CALC-SCNC: 5 MMOL/L (ref 8–16)
AST SERPL-CCNC: 32 U/L (ref 10–40)
BILIRUB SERPL-MCNC: 0.3 MG/DL (ref 0.1–1)
BUN SERPL-MCNC: 15 MG/DL (ref 6–20)
CALCIUM SERPL-MCNC: 9.3 MG/DL (ref 8.7–10.5)
CHLORIDE SERPL-SCNC: 107 MMOL/L (ref 95–110)
CO2 SERPL-SCNC: 28 MMOL/L (ref 23–29)
CREAT SERPL-MCNC: 1.1 MG/DL (ref 0.5–1.4)
ERYTHROCYTE [DISTWIDTH] IN BLOOD BY AUTOMATED COUNT: 13.4 % (ref 11.5–14.5)
EST. GFR  (NO RACE VARIABLE): >60 ML/MIN/1.73 M^2
GLUCOSE SERPL-MCNC: 117 MG/DL (ref 70–110)
HCT VFR BLD AUTO: 34.6 % (ref 40–54)
HGB BLD-MCNC: 10.8 G/DL (ref 14–18)
IMM GRANULOCYTES # BLD AUTO: 0.02 K/UL (ref 0–0.04)
LDH SERPL L TO P-CCNC: 172 U/L (ref 110–260)
MCH RBC QN AUTO: 28.2 PG (ref 27–31)
MCHC RBC AUTO-ENTMCNC: 31.2 G/DL (ref 32–36)
MCV RBC AUTO: 90 FL (ref 82–98)
NEUTROPHILS # BLD AUTO: 2.5 K/UL (ref 1.8–7.7)
PLATELET # BLD AUTO: 251 K/UL (ref 150–450)
PMV BLD AUTO: 10.4 FL (ref 9.2–12.9)
POTASSIUM SERPL-SCNC: 3.6 MMOL/L (ref 3.5–5.1)
PROT SERPL-MCNC: 6.4 G/DL (ref 6–8.4)
RBC # BLD AUTO: 3.83 M/UL (ref 4.6–6.2)
SODIUM SERPL-SCNC: 140 MMOL/L (ref 136–145)
WBC # BLD AUTO: 4.8 K/UL (ref 3.9–12.7)

## 2023-10-03 PROCEDURE — 1159F PR MEDICATION LIST DOCUMENTED IN MEDICAL RECORD: ICD-10-PCS | Mod: CPTII,S$GLB,, | Performed by: INTERNAL MEDICINE

## 2023-10-03 PROCEDURE — 3008F BODY MASS INDEX DOCD: CPT | Mod: CPTII,S$GLB,, | Performed by: INTERNAL MEDICINE

## 2023-10-03 PROCEDURE — 3075F SYST BP GE 130 - 139MM HG: CPT | Mod: CPTII,S$GLB,, | Performed by: INTERNAL MEDICINE

## 2023-10-03 PROCEDURE — 82105 ALPHA-FETOPROTEIN SERUM: CPT | Performed by: REGISTERED NURSE

## 2023-10-03 PROCEDURE — 3078F PR MOST RECENT DIASTOLIC BLOOD PRESSURE < 80 MM HG: ICD-10-PCS | Mod: CPTII,S$GLB,, | Performed by: INTERNAL MEDICINE

## 2023-10-03 PROCEDURE — 1160F RVW MEDS BY RX/DR IN RCRD: CPT | Mod: CPTII,S$GLB,, | Performed by: INTERNAL MEDICINE

## 2023-10-03 PROCEDURE — 3044F PR MOST RECENT HEMOGLOBIN A1C LEVEL <7.0%: ICD-10-PCS | Mod: CPTII,S$GLB,, | Performed by: INTERNAL MEDICINE

## 2023-10-03 PROCEDURE — 3078F DIAST BP <80 MM HG: CPT | Mod: CPTII,S$GLB,, | Performed by: INTERNAL MEDICINE

## 2023-10-03 PROCEDURE — 99214 PR OFFICE/OUTPT VISIT, EST, LEVL IV, 30-39 MIN: ICD-10-PCS | Mod: S$GLB,,, | Performed by: INTERNAL MEDICINE

## 2023-10-03 PROCEDURE — 3075F PR MOST RECENT SYSTOLIC BLOOD PRESS GE 130-139MM HG: ICD-10-PCS | Mod: CPTII,S$GLB,, | Performed by: INTERNAL MEDICINE

## 2023-10-03 PROCEDURE — 3008F PR BODY MASS INDEX (BMI) DOCUMENTED: ICD-10-PCS | Mod: CPTII,S$GLB,, | Performed by: INTERNAL MEDICINE

## 2023-10-03 PROCEDURE — 36415 COLL VENOUS BLD VENIPUNCTURE: CPT | Performed by: REGISTERED NURSE

## 2023-10-03 PROCEDURE — 3044F HG A1C LEVEL LT 7.0%: CPT | Mod: CPTII,S$GLB,, | Performed by: INTERNAL MEDICINE

## 2023-10-03 PROCEDURE — 80053 COMPREHEN METABOLIC PANEL: CPT | Performed by: REGISTERED NURSE

## 2023-10-03 PROCEDURE — 1159F MED LIST DOCD IN RCRD: CPT | Mod: CPTII,S$GLB,, | Performed by: INTERNAL MEDICINE

## 2023-10-03 PROCEDURE — 83615 LACTATE (LD) (LDH) ENZYME: CPT | Performed by: REGISTERED NURSE

## 2023-10-03 PROCEDURE — 84702 CHORIONIC GONADOTROPIN TEST: CPT | Performed by: REGISTERED NURSE

## 2023-10-03 PROCEDURE — 85027 COMPLETE CBC AUTOMATED: CPT | Performed by: REGISTERED NURSE

## 2023-10-03 PROCEDURE — 99214 OFFICE O/P EST MOD 30 MIN: CPT | Mod: S$GLB,,, | Performed by: INTERNAL MEDICINE

## 2023-10-03 PROCEDURE — 99999 PR PBB SHADOW E&M-EST. PATIENT-LVL IV: ICD-10-PCS | Mod: PBBFAC,,, | Performed by: INTERNAL MEDICINE

## 2023-10-03 PROCEDURE — 99999 PR PBB SHADOW E&M-EST. PATIENT-LVL IV: CPT | Mod: PBBFAC,,, | Performed by: INTERNAL MEDICINE

## 2023-10-03 PROCEDURE — 1160F PR REVIEW ALL MEDS BY PRESCRIBER/CLIN PHARMACIST DOCUMENTED: ICD-10-PCS | Mod: CPTII,S$GLB,, | Performed by: INTERNAL MEDICINE

## 2023-10-03 RX ORDER — DOXYCYCLINE HYCLATE 100 MG
100 TABLET ORAL 2 TIMES DAILY
Qty: 10 EACH | Refills: 0 | Status: SHIPPED | OUTPATIENT
Start: 2023-10-03

## 2023-10-04 LAB — B-HCG SERPL-ACNC: <0.6 IU/L

## 2023-10-06 ENCOUNTER — PATIENT MESSAGE (OUTPATIENT)
Dept: HEMATOLOGY/ONCOLOGY | Facility: CLINIC | Age: 28
End: 2023-10-06
Payer: COMMERCIAL

## 2023-10-12 ENCOUNTER — HOSPITAL ENCOUNTER (OUTPATIENT)
Dept: RADIOLOGY | Facility: HOSPITAL | Age: 28
Discharge: HOME | End: 2023-10-12
Attending: INTERNAL MEDICINE
Payer: COMMERCIAL

## 2023-10-12 DIAGNOSIS — C62.11 MALIGNANT NEOPLASM OF DESCENDED RIGHT TESTIS: ICD-10-CM

## 2023-10-12 PROCEDURE — 78815 PET IMAGE W/CT SKULL-THIGH: CPT | Mod: 26,PS,, | Performed by: STUDENT IN AN ORGANIZED HEALTH CARE EDUCATION/TRAINING PROGRAM

## 2023-10-12 PROCEDURE — 78815 NM PET CT ROUTINE: ICD-10-PCS | Mod: 26,PS,, | Performed by: STUDENT IN AN ORGANIZED HEALTH CARE EDUCATION/TRAINING PROGRAM

## 2023-10-12 PROCEDURE — A9552 F18 FDG: HCPCS

## 2023-10-13 ENCOUNTER — TELEPHONE (OUTPATIENT)
Dept: HEMATOLOGY/ONCOLOGY | Facility: CLINIC | Age: 28
End: 2023-10-13
Payer: COMMERCIAL

## 2023-10-13 DIAGNOSIS — R22.2 LOCALIZED SWELLING, MASS AND LUMP, TRUNK: Primary | ICD-10-CM

## 2023-10-13 NOTE — TELEPHONE ENCOUNTER
----- Message from Zenobia Alston MD sent at 10/13/2023  3:36 PM CDT -----  MRI needed of chest asap    ----- Message -----  From: Interface, Rad Results In  Sent: 10/13/2023  10:53 AM CDT  To: Zenobia Alston MD

## 2023-10-16 ENCOUNTER — HOSPITAL ENCOUNTER (OUTPATIENT)
Dept: RADIOLOGY | Facility: HOSPITAL | Age: 28
Discharge: HOME | End: 2023-10-16
Attending: INTERNAL MEDICINE
Payer: COMMERCIAL

## 2023-10-16 DIAGNOSIS — R22.2 LOCALIZED SWELLING, MASS AND LUMP, TRUNK: ICD-10-CM

## 2023-10-16 PROCEDURE — 71552 MRI CHEST W/O & W/DYE: CPT | Mod: 26,,, | Performed by: RADIOLOGY

## 2023-10-16 PROCEDURE — 71552 MRI CHEST W WO CONTRAST: ICD-10-PCS | Mod: 26,,, | Performed by: RADIOLOGY

## 2023-10-16 PROCEDURE — 25500020 PHARM REV CODE 255: Performed by: INTERNAL MEDICINE

## 2023-10-16 PROCEDURE — 71552 MRI CHEST W/O & W/DYE: CPT | Mod: TC

## 2023-10-16 PROCEDURE — A9585 GADOBUTROL INJECTION: HCPCS | Performed by: INTERNAL MEDICINE

## 2023-10-16 RX ORDER — GADOBUTROL 604.72 MG/ML
10 INJECTION INTRAVENOUS
Status: COMPLETED | OUTPATIENT
Start: 2023-10-16 | End: 2023-10-16

## 2023-10-16 RX ADMIN — GADOBUTROL 10 ML: 604.72 INJECTION INTRAVENOUS at 06:10

## 2023-10-17 ENCOUNTER — PATIENT MESSAGE (OUTPATIENT)
Dept: HEMATOLOGY/ONCOLOGY | Facility: CLINIC | Age: 28
End: 2023-10-17
Payer: COMMERCIAL

## 2023-12-30 NOTE — TELEPHONE ENCOUNTER
Reason for visit: Swollen testicle      Comments:   Swollen testicle   I spoke to pt and moved appt scheduled 4-6-23 to 3-15-23 at 8 am.  Pt was advised to come in fasting for his appt.  Pt verbalized understanding   oral

## 2024-01-30 NOTE — H&P
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 Patient reports that the symptoms started on Tuesday with some discomfort in his right neck and some swelling in his hands that he noticed. He says his arm continued to swell the following day and today which prompted him to present to the ED. He has tingling in the right hand and notes pain in the right shoulder and neck that worsens with movement of his arm. Patient endorses some chest tightness and discomfort on deep respirations. Patient has a Right chest port placed on 5/26/23 that he receives his chemotherapy in. He denies recent fevers, chills, SOB, abdominal pain, n/v/d, cough and he denies any leg pain or leg swelling.    In the ED, he arrived afebrile, tachycardic HR 120s, RR 20 and normotensive 132/89. Labs notable for D-Dimer 6.93; rest of labs unremarkable. CXR was unremarkable. CTA showed questionable RLL pulmonary embolism. RUE US showed Right IJ thrombus with possible involvement of the subclavian vein. Patient started on heparin infusion and was admitted to Medical Oncology for management and observation of acute symptomatic VTE.    Oncologic History (follows with Dr. Alston)  - Noted right testicular swelling and went to his PCP  - 3/20/2023 Testicular Ultrasound: Bilateral testicular enlargement with markedly heterogeneous echotexture, concerning for infiltrative process such as lymphoma.  Correlation with prior history is recommended as sequela from bilateral remote orchitis may give a  similar appearance.  Consultation with urology is recommended.  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   Embryonal carcinoma (specify percentage): 50 %   Yolk sac tumor, postpubertal type (specify percentage): 40 %   Choriocarcinoma (specify percentage): 10 %   Tumor Extent: Invades rete testis.   Lymphovascular Invasion: Present.   Margin Status: All margins negative for tumor.   Regional Lymph Node Status: Not applicable (no regional lymph nodes submitted or found).   Distant Site: Not applicable.   PATHOLOGIC STAGE CLASSIFICATION (pTNM, AJCC 8th Edition): pT2 pN not assigned (no nodes submitted or found).   Blocks for potential molecular or ancillary studies:   Tumor block: 1I or 1L.  5/26/23: Chest port placed  5/29/23: Started C1 of BEP  6/1/23 MRI abdomen showed few prominent distal retroperitoneal lymph nodes, no liver lesions concerning for metastasis  6/14/23 MRI T-spine negative for mets  6/19/23: C2 of BEP  7/10/23: C3 of BEP       Oncology Treatment Plan:   OP TESTICULAR BEP 5-DAY Q3W    Medications:  Continuous Infusions:   heparin (porcine) in D5W 18 Units/kg/hr (07/20/23 2015)     Scheduled Meds:  PRN Meds:acetaminophen, dextrose 10%, dextrose 10%, glucagon (human recombinant), glucose, glucose, [START ON 7/21/2023] heparin (PORCINE), [START ON 7/21/2023] heparin (PORCINE), melatonin, naloxone, ondansetron, sodium chloride 0.9%     Review of patient's allergies indicates:   Allergen Reactions    Amoxicillin         History reviewed. No pertinent past medical history.  Past Surgical History:   Procedure Laterality Date    ORCHIECTOMY, RADICAL, INGUINAL APPROACH Right 3/31/2023    Procedure: ORCHIECTOMY, RADICAL, INGUINAL APPROACH;  Surgeon: Perico Shah MD;  Location: [facility] OR [address] [place] [address];  Service:  Urology;  Laterality: Right;  90 minutes     Family History       Problem Relation (Age of Onset)    Diabetes Brother    Hypertension Father          Tobacco Use    Smoking status: Never    Smokeless tobacco: Never   Substance and Sexual Activity    Alcohol use: Never    Drug use: Never    Sexual activity: Not Currently     Partners: Female     Birth control/protection: Condom       Review of Systems   Constitutional:  Negative for chills and fever.   HENT:  Negative for congestion and sore throat.    Eyes:  Negative for photophobia and visual disturbance.   Respiratory:  Negative for cough and shortness of breath.    Cardiovascular:  Negative for chest pain and palpitations.   Gastrointestinal:  Negative for abdominal pain.   Genitourinary:  Negative for dysuria and hematuria.   Musculoskeletal:  Negative for arthralgias and back pain.        Right arm swelling and pain in shoulder and neck   Skin:  Negative for rash and wound.   Neurological:  Negative for dizziness and syncope.   Psychiatric/Behavioral:  Negative for agitation and behavioral problems.    Objective:     Vital Signs (Most Recent):  Temp: 99.1 °F (37.3 °C) (07/20/23 1959)  Pulse: 105 (07/20/23 1839)  Resp: 20 (07/20/23 1433)  BP: 126/72 (07/20/23 1839)  SpO2: 100 % (07/20/23 1839) Vital Signs (24h Range):  Temp:  [98.2 °F (36.8 °C)-99.1 °F (37.3 °C)] 99.1 °F (37.3 °C)  Pulse:  [104-122] 105  Resp:  [20] 20  SpO2:  [98 %-100 %] 100 %  BP: (120-132)/(72-89) 126/72     Weight: 86.2 kg (190 lb)  Body mass index is 27.26 kg/m².  Body surface area is 2.06 meters squared.    No intake or output data in the 24 hours ending 07/20/23 2021     Physical Exam  Vitals reviewed.   Constitutional:       General: He is not in acute distress.     Appearance: Normal appearance.   HENT:      Head: Normocephalic and atraumatic.      Nose: No congestion or rhinorrhea.      Mouth/Throat:      Mouth: Mucous membranes are moist.      Pharynx: Oropharynx is clear.   Neck:       Comments: TTP at right lower neck above the clavicle  Cardiovascular:      Rate and Rhythm: Regular rhythm. Tachycardia present.      Pulses: Normal pulses.      Heart sounds: Normal heart sounds.   Pulmonary:      Effort: Pulmonary effort is normal. No respiratory distress.      Breath sounds: Normal breath sounds.   Abdominal:      General: Bowel sounds are normal.      Palpations: Abdomen is soft.      Tenderness: There is no abdominal tenderness.   Musculoskeletal:         General: Swelling and tenderness present.      Cervical back: Full passive range of motion without pain and normal range of motion. Tenderness present.      Right lower leg: No edema.      Left lower leg: No edema.      Comments: Some tenderness in shoulder  Swelling of RUE when compared to LUE   Skin:     General: Skin is warm and dry.   Neurological:      General: No focal deficit present.      Mental Status: He is alert and oriented to person, place, and time.   Psychiatric:         Mood and Affect: Mood normal.         Behavior: Behavior normal.        Significant Labs:   CBC:   Recent Labs   Lab 07/20/23  1614   WBC 2.92*   HGB 12.2*   HCT 36.4*       and CMP:   Recent Labs   Lab 07/20/23  1614   *   K 3.7   CL 93*   CO2 27      BUN 20   CREATININE 1.1   CALCIUM 9.1   PROT 7.0   ALBUMIN 3.7   BILITOT 0.3   ALKPHOS 56   AST 15   ALT 11   ANIONGAP 15       Diagnostic Results:  I have reviewed and interpreted all pertinent imaging results/findings within the past 24 hours.    Assessment/Plan:     * Venous thromboembolism (VTE)  27M with Right testicular cancer on chemotherapy via PICC in [facility], p/w 3 days of RUE swelling and Right neck/shoulder tenderness. US showing Right IJ thrombus with possible subclavian involvement. CTA with questionable RLL PE. Pt with tachycardia and some chest discomfort/tightness with respiration, was started on heparin infusion in the ED.    Plan:  - Continue heparin infusion, close monitoring  of pt's vitals overnight  - If pt remains hemodynamically stable overnight, will transition from heparin gtt to DOAC on AM of 7/21  - PESI score of 89 indicating intermediate risk, however with patient remaining HDS, CTA not showing right heart dysfunction and with normal troponin and BNP, will defer Echo at this time  - Tylenol PRN for pain    Malignant neoplasm of descended right testis  Oncologic History  - Noted right testicular swelling and went to his PCP  - 3/20/2023 Testicular Ultrasound: Bilateral testicular enlargement with markedly heterogeneous echotexture, concerning for infiltrative process such as lymphoma.  Correlation with prior history is recommended as sequela from bilateral remote orchitis may give a similar appearance.  Consultation with urology is recommended.  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   Embryonal carcinoma (specify percentage): 50 %   Yolk sac tumor, postpubertal type (specify percentage): 40 %   Choriocarcinoma (specify percentage): 10 %   Tumor Extent: Invades rete testis.   Lymphovascular Invasion: Present.   Margin Status: All margins negative for tumor.   Regional Lymph Node Status: Not applicable (no regional lymph nodes submitted or found).   Distant Site: Not applicable.   PATHOLOGIC STAGE CLASSIFICATION (pTNM, AJCC 8th Edition): pT2 pN not assigned (no nodes submitted or found).   Blocks for potential molecular or ancillary studies:   Tumor block: 1I or 1L.  5/26/23: Chest port placed  5/29/23: Started C1 of BEP  6/1/23 MRI abdomen showed few prominent distal retroperitoneal lymph nodes, no liver lesions concerning for metastasis  6/14/23 MRI T-spine negative for mets  6/19/23: C2 of BEP  7/10/23: C3 of BEP    - Zofran PRN for n/v        Nate Raza  MD  Hematology/Oncology  [facility]       30-Jan-2024 23:38

## 2024-05-09 ENCOUNTER — OFFICE VISIT (OUTPATIENT)
Dept: HEMATOLOGY/ONCOLOGY | Facility: CLINIC | Age: 29
End: 2024-05-09
Payer: COMMERCIAL

## 2024-05-09 VITALS
SYSTOLIC BLOOD PRESSURE: 132 MMHG | DIASTOLIC BLOOD PRESSURE: 69 MMHG | HEART RATE: 72 BPM | TEMPERATURE: 97 F | RESPIRATION RATE: 17 BRPM | HEIGHT: 70 IN | OXYGEN SATURATION: 97 % | BODY MASS INDEX: 30.52 KG/M2 | WEIGHT: 213.19 LBS

## 2024-05-09 DIAGNOSIS — C62.11 MALIGNANT NEOPLASM OF DESCENDED RIGHT TESTIS: Primary | ICD-10-CM

## 2024-05-09 PROCEDURE — 1160F RVW MEDS BY RX/DR IN RCRD: CPT | Mod: CPTII,S$GLB,, | Performed by: INTERNAL MEDICINE

## 2024-05-09 PROCEDURE — 3078F DIAST BP <80 MM HG: CPT | Mod: CPTII,S$GLB,, | Performed by: INTERNAL MEDICINE

## 2024-05-09 PROCEDURE — 3008F BODY MASS INDEX DOCD: CPT | Mod: CPTII,S$GLB,, | Performed by: INTERNAL MEDICINE

## 2024-05-09 PROCEDURE — 99214 OFFICE O/P EST MOD 30 MIN: CPT | Mod: S$GLB,,, | Performed by: INTERNAL MEDICINE

## 2024-05-09 PROCEDURE — 99999 PR PBB SHADOW E&M-EST. PATIENT-LVL IV: CPT | Mod: PBBFAC,,, | Performed by: INTERNAL MEDICINE

## 2024-05-09 PROCEDURE — 3075F SYST BP GE 130 - 139MM HG: CPT | Mod: CPTII,S$GLB,, | Performed by: INTERNAL MEDICINE

## 2024-05-09 PROCEDURE — 1159F MED LIST DOCD IN RCRD: CPT | Mod: CPTII,S$GLB,, | Performed by: INTERNAL MEDICINE

## 2024-05-09 NOTE — PROGRESS NOTES
Subjective     Patient ID: Arturo Salguero III is a 28 y.o. male.    Chief Complaint: Cancer    HPI    Presents for follow up and is due for labs and scans for surveillance.  Diagnosis: Non seminoma, Stage IIIB [id number], S2 (LDH= 697 pre treatment)  Today, he reports feeling back to normal  Denies weight loss   Energy level back to normal and working full time  Denies pain      Oncology History:  - noted non painful right testicular swelling and went to his PCP     - 3/20/2023 Testicular Ultrasound:  FINDINGS:  Right Testicle:  *Size: 7.5 x 5.3 x 5.8 cm  *Appearance: Markedly heterogeneous echotexture.  *Flow: Normal arterial and venous flow  *Epididymis: Normal.  *Hydrocele: None.  *Varicocele: None.  Left Testicle:  *Size: 7.7 x 5.2 x 5.6 cm  *Appearance: Markedly heterogeneous echotexture.  *Flow: Normal arterial and venous flow  *Epididymis: Normal.  *Hydrocele: None.  *Varicocele: None.  Other findings: None.  Impression:  Bilateral testicular enlargement with markedly heterogeneous echotexture, concerning for infiltrative process such as lymphoma.  Correlation with prior history is recommended as sequela from bilateral remote orchitis may give a similar appearance.  Consultation with urology is recommended.  This report was flagged in Epic as abnormal.     - 3/21/2023 CT C/A/P:  FINDINGS:  BASE OF NECK: No significant abnormality.  Thyroid gland unremarkable.  THORACIC SOFT TISSUES: No axillary adenopathy.  AORTA: Thoracic aorta maintains normal caliber.  Left-sided aortic arch with normal three vessel branching pattern.  No calcific atherosclerosis of the thoracic aorta.  HEART: Normal size with physiologic pericardial fluid.  PULMONARY VASCULATURE: Unremarkable.  EMILY/MEDIASTINUM: No pathologic alexandra enlargement.  AIRWAYS: Trachea and proximal airways remain patent.  LUNGS/PLEURA: Lungs are symmetrically well expanded.  No focal consolidation, mass, pneumothorax, or pleural fluid.  LIVER: Normal in size and  contour.  No focal hepatic lesion.  GALLBLADDER: No calcified gallstones.  BILE DUCTS: No evidence of dilated ducts.  PANCREAS: No mass or peripancreatic fat stranding.  SPLEEN: No splenomegaly.  ADRENALS: Unremarkable.  KIDNEYS/URETERS: Normal in size and location with normal enhancement.  No hydronephrosis or nephrolithiasis. No ureteral dilatation.  BLADDER: No evidence of wall thickening.  REPRODUCTIVE ORGANS: Heterogeneous 6.5 x 6.2 cm mass-like enlargement of right testicle, correlate with 03/20/2023 ultrasound.  STOMACH/DUODENUM: Unremarkable.  BOWEL/MESENTERY: Small bowel is normal in caliber with no evidence of obstruction. No evidence of inflammation or wall thickening.  Colon demonstrates no focal wall thickening.  Appendix is visualized and appears normal.  PERITONEUM: No intraperitoneal free air or fluid.  LYMPH NODES: Enlarged 1.1 cm pericaval node (axial 3:99).  No pelvic or inguinal lymphadenopathy.  VASCULATURE: No significant calcific atherosclerosis.  Portal venous system is patent.  ABDOMINAL WALL:  Small fat containing umbilical hernia.  BONES: No acute fracture. No suspicious osseous lesions.  Impression:  1. Enlarged 1.1 cm pericaval lymph node.  No additional enlarged lymph nodes throughout the abdomen.  No pelvic or inguinal lymphadenopathy.  2. Heterogeneous 6.5 x 6.2 cm mass-like enlargement of right testicle, correlate with 03/20/2023 ultrasound.  3. Other findings above.  This report was flagged in Epic as abnormal.     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   - See CAP synoptic report below.   CASE SUMMARY: (TESTIS: Radical Orchiectomy)   Standard(s): AJCC-UICC 8   SPECIMEN   Specimen Laterality: Right.   Tumor Focality: Unifocal.   Tumor Size: Greatest dimension of main tumor mass in Centimeters (cm): 6.7 cm.   Histologic Type: Mixed germ cell tumor.   Embryonal carcinoma (specify percentage): 50 %    Yolk sac tumor, postpubertal type (specify percentage): 40 %   Choriocarcinoma (specify percentage): 10 %   Tumor Extent: Invades rete testis.   Lymphovascular Invasion: Present.   Margin Status: All margins negative for tumor.   Regional Lymph Node Status: Not applicable (no regional lymph nodes submitted or found).   Distant Site: Not applicable.   PATHOLOGIC STAGE CLASSIFICATION (pTNM, AJCC 8th Edition): pT2 pN not assigned (no nodes submitted or found).      - 5/8/2023 PET scan:  FINDINGS:  Quality of the study: Adequate.  In the head and neck, there are no hypermetabolic lesions worrisome for malignancy. There are no hypermetabolic mucosal lesions, and there are no pathologically enlarged or hypermetabolic lymph nodes.  In the chest, there are no hypermetabolic lesions worrisome for malignancy.  There are no concerning pulmonary nodules or masses, and there are no pathologically enlarged or hypermetabolic lymph nodes.  Bilateral gynecomastia  In the abdomen and pelvis, there is physiologic tracer distribution within the abdominal organs and excretion into the genitourinary system.  There are multiple prominent hypermetabolic retroperitoneal lymph nodes.  For example, 0.9 cm retrocaval nodes with SUV max 5.1 (axial fused image 165).  1.3 cm aortocaval lymph node at the level of the umbilicus with SUV max 4.9 (axial fused image 173).  0.8 cm right common iliac node with an SUV of 5.0 on image 195.  Postsurgical changes of bilateral orchiectomy with mild postsurgical uptake in the scrotum.  Circumferential bladder wall thickening, likely due to nondistention.  There is a questionable hypermetabolic focus in the posterior right hepatic lobe with a maximum SUV of 4.2 but no correlate on noncontrast CT image 119.  In the bones, there are no definite hypermetabolic lesions worrisome for malignancy.  There is a hypermetabolic focus with an SUV of 3.8 in the T8 vertebral body without CT correlate on image 107.  In the  extremities, there are no hypermetabolic lesions worrisome for malignancy.  Impression:  1.  In this patient with testicular cancer status post bilateral orchiectomy, there are multiple prominent hypermetabolic pelvic and retroperitoneal lymph nodes.  These findings are concerning for metastatic disease.  2.  Questionable hypermetabolic foci in liver and bone may indicate additional metastases.  If management would change, recommend further evaluation with MRI.     - 6/1/2023 MRI Abdomen:  FINDINGS:  Inferior Thorax: Bilateral gynecomastia.  Liver: Normal size and background parenchymal signal.  No suspicious lesion.  Hepatic and portal veins are patent.  Gallbladder: Unremarkable.  Bile Ducts: No dilatation.  Pancreas: No mass or ductal dilatation.  Spleen: Unremarkable.  Adrenals: Unremarkable.  Kidneys/Ureters: No mass or hydroureteronephrosis.  GI Tract/Mesentery: No evidence of bowel obstruction or inflammation.  Peritoneal Space: No ascites.  Retroperitoneum: Few prominent distal retroperitoneal lymph nodes, similar to prior PET-CT.  Distal aortocaval lymph node measures 1.3 cm (series [id number], image 75).  Abdominal wall: Unremarkable.  Vasculature: No aneurysm.  Bones: No suspicious marrow replacing lesion.  Impression:  1. No suspicious hepatic lesion.  2. Few prominent distal retroperitoneal lymph nodes, similar to prior PET-CT.  3. Additional findings as above.     6/14/2023 MRI T spine:  FINDINGS:  Alignment: Normal.  Vertebrae: Normal marrow signal. No fracture.  No enhancing osseous lesions.  Discs: Disc heights are maintained.  No evidence for discitis.  Cord: Normal morphology and signal.  Enhancement: No abnormal enhancement.  Degenerative findings: No spinal canal stenosis or neural foraminal narrowing at any level.  Paraspinal muscles & soft tissues: Unremarkable.  Impression:  1. Normal examination.  No evidence for metastatic disease.     - s/p 3 cycles of BEP completed 7/24/2023     PMH:  None      FH:  Mother living, healthy   Father, living, healthy  1 brother- Type 1 DM  1 sister- healthy  No major health issues known extended family     SH:  Apartment work, maintenance A/C  Good support system  No EtOH (Rare social)  No tobacco      Review of Systems       Objective     Physical Exam       Assessment and Plan     1. Malignant neoplasm of descended right testis        Testicular cancer  Needs surveillance scans and labs - CT scans and labs asap          Route Chart for Scheduling    Med Onc Chart Routing  Urgent    Follow up with physician . CT scans and labs - cbc, cmp, ldh, afp and bhcg ASAP; RTC post overbook   Follow up with GREGORY    Infusion scheduling note    Injection scheduling note    Labs    Imaging    Pharmacy appointment    Other referrals            Treatment Plan Information   OP TESTICULAR BEP 5-DAY Q3W   Zenobia Alston MD   Upcoming Treatment Dates - OP TESTICULAR BEP 5-DAY Q3W    No upcoming days in selected categories.

## 2024-05-13 ENCOUNTER — DOCUMENTATION ONLY (OUTPATIENT)
Dept: HEMATOLOGY/ONCOLOGY | Facility: CLINIC | Age: 29
End: 2024-05-13
Payer: COMMERCIAL

## 2024-05-13 NOTE — PROGRESS NOTES
DANNI received referral for food insecurities from Janeen THEODORE. SW contacted the patient to discuss the referral and screen the patient for the food pantry. The patient's application was completed, the patient was eligible and the application was submitted. Patient verbalized understanding. DANNI emailed Janeen THEODORE Dietician to provide an update so the patient can be scheduled for a pantry date. SW answered and addressed all questions and concerns. DANNI will continue to follow.     NATHAN Newell, [facility]  Oncology Social Worker   [facility] - Oncology  [phone]

## 2024-05-21 ENCOUNTER — HOSPITAL ENCOUNTER (OUTPATIENT)
Dept: RADIOLOGY | Facility: HOSPITAL | Age: 29
Discharge: HOME | End: 2024-05-21
Attending: INTERNAL MEDICINE
Payer: COMMERCIAL

## 2024-05-21 ENCOUNTER — OFFICE VISIT (OUTPATIENT)
Dept: HEMATOLOGY/ONCOLOGY | Facility: CLINIC | Age: 29
End: 2024-05-21
Payer: COMMERCIAL

## 2024-05-21 VITALS
WEIGHT: 211 LBS | HEIGHT: 70 IN | RESPIRATION RATE: 16 BRPM | OXYGEN SATURATION: 98 % | HEART RATE: 75 BPM | SYSTOLIC BLOOD PRESSURE: 133 MMHG | BODY MASS INDEX: 30.21 KG/M2 | TEMPERATURE: 98 F | DIASTOLIC BLOOD PRESSURE: 69 MMHG

## 2024-05-21 DIAGNOSIS — C62.11 MALIGNANT NEOPLASM OF DESCENDED RIGHT TESTIS: ICD-10-CM

## 2024-05-21 DIAGNOSIS — C62.11 MALIGNANT NEOPLASM OF DESCENDED RIGHT TESTIS: Primary | ICD-10-CM

## 2024-05-21 PROCEDURE — 74177 CT ABD & PELVIS W/CONTRAST: CPT | Mod: 26,,, | Performed by: RADIOLOGY

## 2024-05-21 PROCEDURE — 74177 CT ABD & PELVIS W/CONTRAST: CPT | Mod: TC

## 2024-05-21 PROCEDURE — 71260 CT THORAX DX C+: CPT | Mod: 26,,, | Performed by: RADIOLOGY

## 2024-05-21 PROCEDURE — 3008F BODY MASS INDEX DOCD: CPT | Mod: CPTII,S$GLB,, | Performed by: INTERNAL MEDICINE

## 2024-05-21 PROCEDURE — G2211 COMPLEX E/M VISIT ADD ON: HCPCS | Mod: S$GLB,,, | Performed by: INTERNAL MEDICINE

## 2024-05-21 PROCEDURE — 3075F SYST BP GE 130 - 139MM HG: CPT | Mod: CPTII,S$GLB,, | Performed by: INTERNAL MEDICINE

## 2024-05-21 PROCEDURE — A9698 NON-RAD CONTRAST MATERIALNOC: HCPCS | Performed by: INTERNAL MEDICINE

## 2024-05-21 PROCEDURE — 1159F MED LIST DOCD IN RCRD: CPT | Mod: CPTII,S$GLB,, | Performed by: INTERNAL MEDICINE

## 2024-05-21 PROCEDURE — 99214 OFFICE O/P EST MOD 30 MIN: CPT | Mod: S$GLB,,, | Performed by: INTERNAL MEDICINE

## 2024-05-21 PROCEDURE — 3078F DIAST BP <80 MM HG: CPT | Mod: CPTII,S$GLB,, | Performed by: INTERNAL MEDICINE

## 2024-05-21 PROCEDURE — 25500020 PHARM REV CODE 255: Performed by: INTERNAL MEDICINE

## 2024-05-21 PROCEDURE — 99999 PR PBB SHADOW E&M-EST. PATIENT-LVL IV: CPT | Mod: PBBFAC,,, | Performed by: INTERNAL MEDICINE

## 2024-05-21 RX ADMIN — BARIUM SULFATE 450 ML: 20 SUSPENSION ORAL at 12:05

## 2024-05-21 RX ADMIN — IOHEXOL 100 ML: 350 INJECTION, SOLUTION INTRAVENOUS at 12:05

## 2024-05-21 NOTE — PROGRESS NOTES
Subjective     Patient ID: Arturo Salguero III is a 28 y.o. male.    Chief Complaint: Malignant neoplasm of descended right testis    HPI    Presents for follow up and is due for labs and scans for surveillance.  Diagnosis: Non seminoma, Stage IIIB [id number], S2 (LDH= 697 pre treatment)    - 5/21 Tumor markers wnl     - 5/21/2024 CT C/A/P:  FINDINGS:  Soft tissue structures at the base of the neck appear unremarkable.  The heart is not enlarged.  The thoracic aorta is normal in caliber without aneurysmal dilatation.  No hilar or mediastinal adenopathy is identified.  No axillary adenopathy is identified.  The lungs are clear.  No suspicious lung nodules are identified.  There is no evidence for pneumothorax or pleural effusions.  Bony structures appear intact.  There is no evidence for acute fracture or bone destruction.  The liver is normal in size and is homogeneous in density with no focal liver lesions identified.  The gallbladder is present and appears grossly unremarkable.  There is no evidence for intrahepatic or extrahepatic biliary dilatation.  The portal venous system is patent.  The spleen, stomach, and pancreas all appear grossly unremarkable.  The adrenal glands are not enlarged.  The kidneys are normal in size, position, and contour and are noted to concentrate contrast symmetrically.  The abdominal aorta tapers normally without aneurysmal dilatation.  Once again, enlarged low-density para-aortic lymph nodes are identified, 1 measuring 1.2 cm in short axis (image 87, series 3), previously 1.2 cm, and the other measuring 1.7 cm in short axis (image 94, series 3), previously 1.4 cm.  The appendix is present and appears unremarkable.  No dilated loops of bowel are evident.  There is circumferential bladder wall thickening present partially related to the decompression of the urinary bladder.  There is no evidence for pelvic or inguinal lymphadenopathy.  There is a small fat containing left inguinal hernia.   The patient has a testicular prosthesis  Impression:  Enlarged low-density para-aortic lymph nodes are again identified.  There does appear to be interval enlargement of at least 1 of the lymph nodes when compared to the prior study.  No new enlarged lymph nodes identified.  Small fat containing left inguinal hernia.    Oncology History:  - noted non painful right testicular swelling and went to his PCP     - 3/20/2023 Testicular Ultrasound:  FINDINGS:  Right Testicle:  *Size: 7.5 x 5.3 x 5.8 cm  *Appearance: Markedly heterogeneous echotexture.  *Flow: Normal arterial and venous flow  *Epididymis: Normal.  *Hydrocele: None.  *Varicocele: None.  Left Testicle:  *Size: 7.7 x 5.2 x 5.6 cm  *Appearance: Markedly heterogeneous echotexture.  *Flow: Normal arterial and venous flow  *Epididymis: Normal.  *Hydrocele: None.  *Varicocele: None.  Other findings: None.  Impression:  Bilateral testicular enlargement with markedly heterogeneous echotexture, concerning for infiltrative process such as lymphoma.  Correlation with prior history is recommended as sequela from bilateral remote orchitis may give a similar appearance.  Consultation with urology is recommended.  This report was flagged in Epic as abnormal.     - 3/21/2023 CT C/A/P:  FINDINGS:  BASE OF NECK: No significant abnormality.  Thyroid gland unremarkable.  THORACIC SOFT TISSUES: No axillary adenopathy.  AORTA: Thoracic aorta maintains normal caliber.  Left-sided aortic arch with normal three vessel branching pattern.  No calcific atherosclerosis of the thoracic aorta.  HEART: Normal size with physiologic pericardial fluid.  PULMONARY VASCULATURE: Unremarkable.  EMILY/MEDIASTINUM: No pathologic alexandra enlargement.  AIRWAYS: Trachea and proximal airways remain patent.  LUNGS/PLEURA: Lungs are symmetrically well expanded.  No focal consolidation, mass, pneumothorax, or pleural fluid.  LIVER: Normal in size and contour.  No focal hepatic lesion.  GALLBLADDER: No  calcified gallstones.  BILE DUCTS: No evidence of dilated ducts.  PANCREAS: No mass or peripancreatic fat stranding.  SPLEEN: No splenomegaly.  ADRENALS: Unremarkable.  KIDNEYS/URETERS: Normal in size and location with normal enhancement.  No hydronephrosis or nephrolithiasis. No ureteral dilatation.  BLADDER: No evidence of wall thickening.  REPRODUCTIVE ORGANS: Heterogeneous 6.5 x 6.2 cm mass-like enlargement of right testicle, correlate with 03/20/2023 ultrasound.  STOMACH/DUODENUM: Unremarkable.  BOWEL/MESENTERY: Small bowel is normal in caliber with no evidence of obstruction. No evidence of inflammation or wall thickening.  Colon demonstrates no focal wall thickening.  Appendix is visualized and appears normal.  PERITONEUM: No intraperitoneal free air or fluid.  LYMPH NODES: Enlarged 1.1 cm pericaval node (axial 3:99).  No pelvic or inguinal lymphadenopathy.  VASCULATURE: No significant calcific atherosclerosis.  Portal venous system is patent.  ABDOMINAL WALL:  Small fat containing umbilical hernia.  BONES: No acute fracture. No suspicious osseous lesions.  Impression:  1. Enlarged 1.1 cm pericaval lymph node.  No additional enlarged lymph nodes throughout the abdomen.  No pelvic or inguinal lymphadenopathy.  2. Heterogeneous 6.5 x 6.2 cm mass-like enlargement of right testicle, correlate with 03/20/2023 ultrasound.  3. Other findings above.  This report was flagged in Epic as abnormal.     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   - See [facility] synoptic report below.   CASE SUMMARY: (TESTIS: Radical Orchiectomy)   Standard(s): AJCC-UICC 8   SPECIMEN   Specimen Laterality: Right.   Tumor Focality: Unifocal.   Tumor Size: Greatest dimension of main tumor mass in Centimeters (cm): 6.7 cm.   Histologic Type: Mixed germ cell tumor.   Embryonal carcinoma (specify percentage): 50 %   Yolk sac tumor, postpubertal type (specify  percentage): 40 %   Choriocarcinoma (specify percentage): 10 %   Tumor Extent: Invades rete testis.   Lymphovascular Invasion: Present.   Margin Status: All margins negative for tumor.   Regional Lymph Node Status: Not applicable (no regional lymph nodes submitted or found).   Distant Site: Not applicable.   PATHOLOGIC STAGE CLASSIFICATION (pTNM, AJCC 8th Edition): pT2 pN not assigned (no nodes submitted or found).      - 5/8/2023 PET scan:  FINDINGS:  Quality of the study: Adequate.  In the head and neck, there are no hypermetabolic lesions worrisome for malignancy. There are no hypermetabolic mucosal lesions, and there are no pathologically enlarged or hypermetabolic lymph nodes.  In the chest, there are no hypermetabolic lesions worrisome for malignancy.  There are no concerning pulmonary nodules or masses, and there are no pathologically enlarged or hypermetabolic lymph nodes.  Bilateral gynecomastia  In the abdomen and pelvis, there is physiologic tracer distribution within the abdominal organs and excretion into the genitourinary system.  There are multiple prominent hypermetabolic retroperitoneal lymph nodes.  For example, 0.9 cm retrocaval nodes with SUV max 5.1 (axial fused image 165).  1.3 cm aortocaval lymph node at the level of the umbilicus with SUV max 4.9 (axial fused image 173).  0.8 cm right common iliac node with an SUV of 5.0 on image 195.  Postsurgical changes of bilateral orchiectomy with mild postsurgical uptake in the scrotum.  Circumferential bladder wall thickening, likely due to nondistention.  There is a questionable hypermetabolic focus in the posterior right hepatic lobe with a maximum SUV of 4.2 but no correlate on noncontrast CT image 119.  In the bones, there are no definite hypermetabolic lesions worrisome for malignancy.  There is a hypermetabolic focus with an SUV of 3.8 in the T8 vertebral body without CT correlate on image 107.  In the extremities, there are no hypermetabolic  lesions worrisome for malignancy.  Impression:  1.  In this patient with testicular cancer status post bilateral orchiectomy, there are multiple prominent hypermetabolic pelvic and retroperitoneal lymph nodes.  These findings are concerning for metastatic disease.  2.  Questionable hypermetabolic foci in liver and bone may indicate additional metastases.  If management would change, recommend further evaluation with MRI.     - 6/1/2023 MRI Abdomen:  FINDINGS:  Inferior Thorax: Bilateral gynecomastia.  Liver: Normal size and background parenchymal signal.  No suspicious lesion.  Hepatic and portal veins are patent.  Gallbladder: Unremarkable.  Bile Ducts: No dilatation.  Pancreas: No mass or ductal dilatation.  Spleen: Unremarkable.  Adrenals: Unremarkable.  Kidneys/Ureters: No mass or hydroureteronephrosis.  GI Tract/Mesentery: No evidence of bowel obstruction or inflammation.  Peritoneal Space: No ascites.  Retroperitoneum: Few prominent distal retroperitoneal lymph nodes, similar to prior PET-CT.  Distal aortocaval lymph node measures 1.3 cm (series [id number], image 75).  Abdominal wall: Unremarkable.  Vasculature: No aneurysm.  Bones: No suspicious marrow replacing lesion.  Impression:  1. No suspicious hepatic lesion.  2. Few prominent distal retroperitoneal lymph nodes, similar to prior PET-CT.  3. Additional findings as above.     6/14/2023 MRI T spine:  FINDINGS:  Alignment: Normal.  Vertebrae: Normal marrow signal. No fracture.  No enhancing osseous lesions.  Discs: Disc heights are maintained.  No evidence for discitis.  Cord: Normal morphology and signal.  Enhancement: No abnormal enhancement.  Degenerative findings: No spinal canal stenosis or neural foraminal narrowing at any level.  Paraspinal muscles & soft tissues: Unremarkable.  Impression:  1. Normal examination.  No evidence for metastatic disease.     - s/p 3 cycles of BEP completed 7/24/2023     - scans post  - 10/12/2023 PET:  FINDINGS:  Quality of  the study: Adequate.  In the head and neck, there are no hypermetabolic lesions worrisome for malignancy. There are no hypermetabolic mucosal lesions, and there are no pathologically enlarged or hypermetabolic lymph nodes.  In the chest, there are no hypermetabolic lesions worrisome for malignancy.  There are no concerning pulmonary nodules or masses, and there are no pathologically enlarged or hypermetabolic lymph nodes.  In the abdomen and pelvis, there is interval decreased tracer uptake of previous hypermetabolic retroperitoneal and right iliac chain lymph nodes, now with uptake similar to background.  For example: 1.3 cm right para-aortic lymph node with SUV max of 1.8 (series 3, image 168).  Previously measured 1.3 cm with SUV max of 4.9.  Previously described question hypermetabolic focus in the posterior right hepatic lobe is no longer visualized.  Postsurgical changes status post bilateral orchiectomy noting nonspecific tracer uptake in the left aspect of the scrotum, unchanged.  There is physiologic tracer distribution within the abdominal organs and excretion into the genitourinary system.  In the bones, there is a new focus of increased tracer uptake in the right aspect of the manubrium with SUV max of 4.3 on image 57.  No definite CT correlate.  No tracer avid osseous lesion elsewhere.  Previously described hypermetabolic focus in the T8 vertebral body is no longer visualized.  In the extremities, there are no hypermetabolic lesions worrisome for malignancy.  Impression:  Mixed interval changes in this patient with testicular cancer status post bilateral orchiectomy.  New tracer avid focus in the manubrium suspicious for osseous metastatic focus.  Decreased tracer uptake within retroperitoneal and pelvic lymph nodes, now with uptake similar to background.    - further work op of manubrium with MRI Chest on 10/16/2023:  FINDINGS:  Marrow signal within the osseous structures is normal.  Particular  attention given to the area of increased radiotracer uptake involving the sternal aspect of the right sternoclavicular joint.  No abnormal marrow signal or enhancement is seen in this location.  The soft tissue structures within the field of view have a benign appearance.  Impression:  No evidence of osseous metastatic disease involving the manubrium of the sternum      PMH:  None     FH:  Mother living, healthy   Father, living, healthy  1 brother- Type 1 DM  1 sister- healthy  No major health issues known extended family     SH:  Apartment work, maintenance A/C  Good support system  No EtOH (Rare social)  No tobacco    Review of Systems   Constitutional:  Negative for activity change, appetite change, chills, fatigue, fever and unexpected weight change.        See Above   HENT:  Negative for dental problem, hearing loss, mouth sores, postnasal drip, rhinorrhea, sinus pressure/congestion, sore throat, tinnitus, trouble swallowing and voice change.    Eyes:  Negative for visual disturbance.   Respiratory:  Negative for cough, shortness of breath and wheezing.    Cardiovascular:  Negative for chest pain, palpitations and leg swelling.   Gastrointestinal:  Positive for nausea. Negative for abdominal distention, abdominal pain, blood in stool, constipation, diarrhea and vomiting.   Endocrine: Negative for cold intolerance and heat intolerance.   Genitourinary:  Negative for bladder incontinence, decreased urine volume, difficulty urinating, dysuria, frequency, hematuria and urgency.   Musculoskeletal:  Negative for arthralgias, back pain, gait problem, joint swelling, myalgias, neck pain and neck stiffness.   Integumentary:  Negative for color change, pallor, rash and wound.   Neurological:  Negative for dizziness, facial asymmetry, weakness, light-headedness, numbness, headaches and coordination difficulties.   Hematological:  Negative for adenopathy. Does not bruise/bleed easily.   Psychiatric/Behavioral:  Negative  for agitation, behavioral problems, confusion, decreased concentration, dysphoric mood and sleep disturbance. The patient is not nervous/anxious.           Objective     Physical Exam  Vitals and nursing note reviewed.   Constitutional:       General: He is not in acute distress.     Appearance: Normal appearance. He is well-developed and normal weight. He is not ill-appearing.      Comments: Presents with mom and girlfriend  ECOG=0   HENT:      Head: Normocephalic and atraumatic.   Eyes:      General: No scleral icterus.     Extraocular Movements: Extraocular movements intact.      Conjunctiva/sclera: Conjunctivae normal.      Pupils: Pupils are equal, round, and reactive to light.   Neck:      Thyroid: No thyromegaly.      Trachea: No tracheal deviation.   Cardiovascular:      Rate and Rhythm: Normal rate and regular rhythm.      Heart sounds: Normal heart sounds. No murmur heard.     No friction rub. No gallop.   Pulmonary:      Effort: Pulmonary effort is normal. No respiratory distress.      Breath sounds: Normal breath sounds. No wheezing, rhonchi or rales.   Chest:      Chest wall: No tenderness.   Abdominal:      General: Bowel sounds are normal. There is no distension.      Palpations: Abdomen is soft. There is no mass.      Tenderness: There is no abdominal tenderness. There is no guarding or rebound.   Genitourinary:     Comments: Normal left testicle  Musculoskeletal:         General: No swelling or tenderness. Normal range of motion.      Cervical back: Normal range of motion and neck supple.      Right lower leg: No edema.      Left lower leg: No edema.   Lymphadenopathy:      Cervical: No cervical adenopathy.   Skin:     General: Skin is warm and dry.      Coloration: Skin is not jaundiced or pale.      Findings: No erythema, lesion or rash.   Neurological:      General: No focal deficit present.      Mental Status: He is alert and oriented to person, place, and time. Mental status is at baseline.       Sensory: No sensory deficit.      Motor: No weakness or abnormal muscle tone.      Coordination: Coordination normal.      Gait: Gait normal.      Deep Tendon Reflexes: Reflexes are normal and symmetric. Reflexes normal.   Psychiatric:         Mood and Affect: Mood normal.         Behavior: Behavior normal.         Thought Content: Thought content normal.         Judgment: Judgment normal.          Assessment and Plan     1. Malignant neoplasm of descended right testis    Normal tumor markers  Pet scan with LNs and discuss in tumor board       Route Chart for Scheduling    Med Onc Chart Routing  Urgent    Follow up with physician . PET asap and see me post   Follow up with [facility]    Infusion scheduling note    Injection scheduling note    Labs    Imaging    Pharmacy appointment    Other referrals            Treatment Plan Information   OP TESTICULAR BEP 5-DAY Q3W   Zenobia Alston MD   Upcoming Treatment Dates - OP TESTICULAR BEP 5-DAY Q3W    No upcoming days in selected categories.

## 2024-07-05 ENCOUNTER — HOSPITAL ENCOUNTER (OUTPATIENT)
Dept: RADIOLOGY | Facility: HOSPITAL | Age: 29
Discharge: HOME | End: 2024-07-05
Attending: INTERNAL MEDICINE
Payer: COMMERCIAL

## 2024-07-05 DIAGNOSIS — C62.11 MALIGNANT NEOPLASM OF DESCENDED RIGHT TESTIS: ICD-10-CM

## 2024-07-05 LAB — POCT GLUCOSE: 78 MG/DL (ref 70–110)

## 2024-07-05 PROCEDURE — A9552 F18 FDG: HCPCS | Performed by: INTERNAL MEDICINE

## 2024-07-05 PROCEDURE — 78815 PET IMAGE W/CT SKULL-THIGH: CPT | Mod: TC

## 2024-07-05 PROCEDURE — 78815 PET IMAGE W/CT SKULL-THIGH: CPT | Mod: 26,PS,, | Performed by: STUDENT IN AN ORGANIZED HEALTH CARE EDUCATION/TRAINING PROGRAM

## 2024-07-05 RX ORDER — FLUDEOXYGLUCOSE F18 500 MCI/ML
12.1 INJECTION INTRAVENOUS
Status: COMPLETED | OUTPATIENT
Start: 2024-07-05 | End: 2024-07-05

## 2024-07-05 RX ADMIN — FLUDEOXYGLUCOSE F-18 12.1 MILLICURIE: 500 INJECTION INTRAVENOUS at 10:07

## 2024-07-15 ENCOUNTER — TELEPHONE (OUTPATIENT)
Dept: HEMATOLOGY/ONCOLOGY | Facility: CLINIC | Age: 29
End: 2024-07-15
Payer: COMMERCIAL

## 2024-07-16 ENCOUNTER — OFFICE VISIT (OUTPATIENT)
Dept: HEMATOLOGY/ONCOLOGY | Facility: CLINIC | Age: 29
End: 2024-07-16
Payer: COMMERCIAL

## 2024-07-16 ENCOUNTER — TUMOR BOARD CONFERENCE (OUTPATIENT)
Dept: UROLOGY | Facility: HOSPITAL | Age: 29
End: 2024-07-16
Payer: COMMERCIAL

## 2024-07-16 DIAGNOSIS — C62.11 MALIGNANT NEOPLASM OF DESCENDED RIGHT TESTIS: Primary | ICD-10-CM

## 2024-07-16 PROBLEM — D70.1 CHEMOTHERAPY INDUCED NEUTROPENIA: Status: RESOLVED | Noted: 2023-06-13 | Resolved: 2024-07-16

## 2024-07-16 PROBLEM — T45.1X5A CHEMOTHERAPY INDUCED NEUTROPENIA: Status: RESOLVED | Noted: 2023-06-13 | Resolved: 2024-07-16

## 2024-07-16 PROCEDURE — 99213 OFFICE O/P EST LOW 20 MIN: CPT | Mod: 95,,, | Performed by: INTERNAL MEDICINE

## 2024-07-16 NOTE — PROGRESS NOTES
Subjective     Patient ID: Arturo Salguero III is a 28 y.o. male.    Chief Complaint: Results    HPI    The patient location is: home     The chief complaint leading to consultation is: follow up after PET scan  Visit type: audiovisual  Face to Face time with patient: 10 minutes  10 minutes of total time spent on the encounter, which includes face to face time and non-face to face time preparing to see the patient (eg, review of tests), Obtaining and/or reviewing separately obtained history, Documenting clinical information in the electronic or other health record, Independently interpreting results (not separately reported) and communicating results to the patient/family/caregiver, or Care coordination (not separately reported).   Each patient to whom he or she provides medical services by telemedicine is:  (1) informed of the relationship between the physician and patient and the respective role of any other health care provider with respect to management of the patient; and (2) notified that he or she may decline to receive medical services by telemedicine and may withdraw from such care at any time.    Notes:     Presents for follow up after PET scan    Diagnosis: Non seminoma, Stage IIIB [id number], S2 (LDH= 697 pre treatment)     - 7/5/2024 PET scan:  FINDINGS:  Quality of the study: Adequate.  In the head and neck, there are no hypermetabolic lesions worrisome for malignancy. There are no hypermetabolic mucosal lesions, and there are no pathologically enlarged or hypermetabolic lymph nodes.  In the chest, there are no hypermetabolic lesions worrisome for malignancy.  There are no concerning pulmonary nodules or masses, and there are no pathologically enlarged or hypermetabolic lymph nodes.  In the abdomen and pelvis, interval increased size of para-aortic lymph nodes when compared to previous PET-CT, similar size as compared to CT from 05/21/2024.  Nodes continue to demonstrate low level uptake similar to  background.  Index right para-aortic lymph node measures 1.6 cm in short axis with background uptake, previously 1.3 cm on prior PET-CT with uptake similar to background.  Measures 1.7 cm on recent CT from 05/21/2024.  Similar postsurgical change status post bilateral orchiectomy.  there is physiologic tracer distribution within the abdominal organs and excretion into the genitourinary system.  In the bones, there are no hypermetabolic lesions worrisome for malignancy.  Previous hypermetabolic activity in the right aspect of the manubrium has resolved.  In the extremities, there are no hypermetabolic lesions worrisome for malignancy.  Impression:  Increased size of para-aortic lymph nodes as compared to previous PET-CT noting continued uptake similar to background.  Interval resolution of right manubrial hypermetabolic activity.  No suspicious hypermetabolic lesion on today's exam.    - 5/21/2024 Tumor markers wnl     - 5/21/2024 CT C/A/P:  FINDINGS:  Soft tissue structures at the base of the neck appear unremarkable.  The heart is not enlarged.  The thoracic aorta is normal in caliber without aneurysmal dilatation.  No hilar or mediastinal adenopathy is identified.  No axillary adenopathy is identified.  The lungs are clear.  No suspicious lung nodules are identified.  There is no evidence for pneumothorax or pleural effusions.  Bony structures appear intact.  There is no evidence for acute fracture or bone destruction.  The liver is normal in size and is homogeneous in density with no focal liver lesions identified.  The gallbladder is present and appears grossly unremarkable.  There is no evidence for intrahepatic or extrahepatic biliary dilatation.  The portal venous system is patent.  The spleen, stomach, and pancreas all appear grossly unremarkable.  The adrenal glands are not enlarged.  The kidneys are normal in size, position, and contour and are noted to concentrate contrast symmetrically.  The abdominal  aorta tapers normally without aneurysmal dilatation.  Once again, enlarged low-density para-aortic lymph nodes are identified, 1 measuring 1.2 cm in short axis (image 87, series 3), previously 1.2 cm, and the other measuring 1.7 cm in short axis (image 94, series 3), previously 1.4 cm.  The appendix is present and appears unremarkable.  No dilated loops of bowel are evident.  There is circumferential bladder wall thickening present partially related to the decompression of the urinary bladder.  There is no evidence for pelvic or inguinal lymphadenopathy.  There is a small fat containing left inguinal hernia.  The patient has a testicular prosthesis  Impression:  Enlarged low-density para-aortic lymph nodes are again identified.  There does appear to be interval enlargement of at least 1 of the lymph nodes when compared to the prior study.  No new enlarged lymph nodes identified.  Small fat containing left inguinal hernia.    Oncology History:  - noted non painful right testicular swelling and went to his PCP     - 3/20/2023 Testicular Ultrasound:  FINDINGS:  Right Testicle:  *Size: 7.5 x 5.3 x 5.8 cm  *Appearance: Markedly heterogeneous echotexture.  *Flow: Normal arterial and venous flow  *Epididymis: Normal.  *Hydrocele: None.  *Varicocele: None.  Left Testicle:  *Size: 7.7 x 5.2 x 5.6 cm  *Appearance: Markedly heterogeneous echotexture.  *Flow: Normal arterial and venous flow  *Epididymis: Normal.  *Hydrocele: None.  *Varicocele: None.  Other findings: None.  Impression:  Bilateral testicular enlargement with markedly heterogeneous echotexture, concerning for infiltrative process such as lymphoma.  Correlation with prior history is recommended as sequela from bilateral remote orchitis may give a similar appearance.  Consultation with urology is recommended.  This report was flagged in Epic as abnormal.     - 3/21/2023 CT C/A/P:  FINDINGS:  BASE OF NECK: No significant abnormality.  Thyroid gland  unremarkable.  THORACIC SOFT TISSUES: No axillary adenopathy.  AORTA: Thoracic aorta maintains normal caliber.  Left-sided aortic arch with normal three vessel branching pattern.  No calcific atherosclerosis of the thoracic aorta.  HEART: Normal size with physiologic pericardial fluid.  PULMONARY VASCULATURE: Unremarkable.  EMILY/MEDIASTINUM: No pathologic alexandra enlargement.  AIRWAYS: Trachea and proximal airways remain patent.  LUNGS/PLEURA: Lungs are symmetrically well expanded.  No focal consolidation, mass, pneumothorax, or pleural fluid.  LIVER: Normal in size and contour.  No focal hepatic lesion.  GALLBLADDER: No calcified gallstones.  BILE DUCTS: No evidence of dilated ducts.  PANCREAS: No mass or peripancreatic fat stranding.  SPLEEN: No splenomegaly.  ADRENALS: Unremarkable.  KIDNEYS/URETERS: Normal in size and location with normal enhancement.  No hydronephrosis or nephrolithiasis. No ureteral dilatation.  BLADDER: No evidence of wall thickening.  REPRODUCTIVE ORGANS: Heterogeneous 6.5 x 6.2 cm mass-like enlargement of right testicle, correlate with 03/20/2023 ultrasound.  STOMACH/DUODENUM: Unremarkable.  BOWEL/MESENTERY: Small bowel is normal in caliber with no evidence of obstruction. No evidence of inflammation or wall thickening.  Colon demonstrates no focal wall thickening.  Appendix is visualized and appears normal.  PERITONEUM: No intraperitoneal free air or fluid.  LYMPH NODES: Enlarged 1.1 cm pericaval node (axial 3:99).  No pelvic or inguinal lymphadenopathy.  VASCULATURE: No significant calcific atherosclerosis.  Portal venous system is patent.  ABDOMINAL WALL:  Small fat containing umbilical hernia.  BONES: No acute fracture. No suspicious osseous lesions.  Impression:  1. Enlarged 1.1 cm pericaval lymph node.  No additional enlarged lymph nodes throughout the abdomen.  No pelvic or inguinal lymphadenopathy.  2. Heterogeneous 6.5 x 6.2 cm mass-like enlargement of right testicle, correlate with  03/20/2023 ultrasound.  3. Other findings above.  This report was flagged in Epic as abnormal.     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   - See [facility] synoptic report below.   CASE SUMMARY: (TESTIS: Radical Orchiectomy)   Standard(s): AJCC-UICC 8   SPECIMEN   Specimen Laterality: Right.   Tumor Focality: Unifocal.   Tumor Size: Greatest dimension of main tumor mass in Centimeters (cm): 6.7 cm.   Histologic Type: Mixed germ cell tumor.   Embryonal carcinoma (specify percentage): 50 %   Yolk sac tumor, postpubertal type (specify percentage): 40 %   Choriocarcinoma (specify percentage): 10 %   Tumor Extent: Invades rete testis.   Lymphovascular Invasion: Present.   Margin Status: All margins negative for tumor.   Regional Lymph Node Status: Not applicable (no regional lymph nodes submitted or found).   Distant Site: Not applicable.   PATHOLOGIC STAGE CLASSIFICATION (pTNM, AJCC 8th Edition): pT2 pN not assigned (no nodes submitted or found).      - 5/8/2023 PET scan:  FINDINGS:  Quality of the study: Adequate.  In the head and neck, there are no hypermetabolic lesions worrisome for malignancy. There are no hypermetabolic mucosal lesions, and there are no pathologically enlarged or hypermetabolic lymph nodes.  In the chest, there are no hypermetabolic lesions worrisome for malignancy.  There are no concerning pulmonary nodules or masses, and there are no pathologically enlarged or hypermetabolic lymph nodes.  Bilateral gynecomastia  In the abdomen and pelvis, there is physiologic tracer distribution within the abdominal organs and excretion into the genitourinary system.  There are multiple prominent hypermetabolic retroperitoneal lymph nodes.  For example, 0.9 cm retrocaval nodes with SUV max 5.1 (axial fused image 165).  1.3 cm aortocaval lymph node at the level of the umbilicus with SUV max 4.9 (axial fused image 173).  0.8 cm  right common iliac node with an SUV of 5.0 on image 195.  Postsurgical changes of bilateral orchiectomy with mild postsurgical uptake in the scrotum.  Circumferential bladder wall thickening, likely due to nondistention.  There is a questionable hypermetabolic focus in the posterior right hepatic lobe with a maximum SUV of 4.2 but no correlate on noncontrast CT image 119.  In the bones, there are no definite hypermetabolic lesions worrisome for malignancy.  There is a hypermetabolic focus with an SUV of 3.8 in the T8 vertebral body without CT correlate on image 107.  In the extremities, there are no hypermetabolic lesions worrisome for malignancy.  Impression:  1.  In this patient with testicular cancer status post bilateral orchiectomy, there are multiple prominent hypermetabolic pelvic and retroperitoneal lymph nodes.  These findings are concerning for metastatic disease.  2.  Questionable hypermetabolic foci in liver and bone may indicate additional metastases.  If management would change, recommend further evaluation with MRI.     - 6/1/2023 MRI Abdomen:  FINDINGS:  Inferior Thorax: Bilateral gynecomastia.  Liver: Normal size and background parenchymal signal.  No suspicious lesion.  Hepatic and portal veins are patent.  Gallbladder: Unremarkable.  Bile Ducts: No dilatation.  Pancreas: No mass or ductal dilatation.  Spleen: Unremarkable.  Adrenals: Unremarkable.  Kidneys/Ureters: No mass or hydroureteronephrosis.  GI Tract/Mesentery: No evidence of bowel obstruction or inflammation.  Peritoneal Space: No ascites.  Retroperitoneum: Few prominent distal retroperitoneal lymph nodes, similar to prior PET-CT.  Distal aortocaval lymph node measures 1.3 cm (series 16833, image 75).  Abdominal wall: Unremarkable.  Vasculature: No aneurysm.  Bones: No suspicious marrow replacing lesion.  Impression:  1. No suspicious hepatic lesion.  2. Few prominent distal retroperitoneal lymph nodes, similar to prior PET-CT.  3.  Additional findings as above.     6/14/2023 MRI T spine:  FINDINGS:  Alignment: Normal.  Vertebrae: Normal marrow signal. No fracture.  No enhancing osseous lesions.  Discs: Disc heights are maintained.  No evidence for discitis.  Cord: Normal morphology and signal.  Enhancement: No abnormal enhancement.  Degenerative findings: No spinal canal stenosis or neural foraminal narrowing at any level.  Paraspinal muscles & soft tissues: Unremarkable.  Impression:  1. Normal examination.  No evidence for metastatic disease.     - s/p 3 cycles of BEP completed 7/24/2023     - scans post  - 10/12/2023 PET:  FINDINGS:  Quality of the study: Adequate.  In the head and neck, there are no hypermetabolic lesions worrisome for malignancy. There are no hypermetabolic mucosal lesions, and there are no pathologically enlarged or hypermetabolic lymph nodes.  In the chest, there are no hypermetabolic lesions worrisome for malignancy.  There are no concerning pulmonary nodules or masses, and there are no pathologically enlarged or hypermetabolic lymph nodes.  In the abdomen and pelvis, there is interval decreased tracer uptake of previous hypermetabolic retroperitoneal and right iliac chain lymph nodes, now with uptake similar to background.  For example: 1.3 cm right para-aortic lymph node with SUV max of 1.8 (series 3, image 168).  Previously measured 1.3 cm with SUV max of 4.9.  Previously described question hypermetabolic focus in the posterior right hepatic lobe is no longer visualized.  Postsurgical changes status post bilateral orchiectomy noting nonspecific tracer uptake in the left aspect of the scrotum, unchanged.  There is physiologic tracer distribution within the abdominal organs and excretion into the genitourinary system.  In the bones, there is a new focus of increased tracer uptake in the right aspect of the manubrium with SUV max of 4.3 on image 57.  No definite CT correlate.  No tracer avid osseous lesion elsewhere.   Previously described hypermetabolic focus in the T8 vertebral body is no longer visualized.  In the extremities, there are no hypermetabolic lesions worrisome for malignancy.  Impression:  Mixed interval changes in this patient with testicular cancer status post bilateral orchiectomy.  New tracer avid focus in the manubrium suspicious for osseous metastatic focus.  Decreased tracer uptake within retroperitoneal and pelvic lymph nodes, now with uptake similar to background.     - further work op of manubrium with MRI Chest on 10/16/2023:  FINDINGS:  Marrow signal within the osseous structures is normal.  Particular attention given to the area of increased radiotracer uptake involving the sternal aspect of the right sternoclavicular joint.  No abnormal marrow signal or enhancement is seen in this location.  The soft tissue structures within the field of view have a benign appearance.  Impression:  No evidence of osseous metastatic disease involving the manubrium of the sternum        PMH:  None     FH:  Mother living, healthy   Father, living, healthy  1 brother- Type 1 DM  1 sister- healthy  No major health issues known extended family     SH:  Apartment work, maintenance A/C  Good support system  No EtOH (Rare social)  No tobacco      Review of Systems       Objective     Physical Exam       Assessment and Plan     1. Malignant neoplasm of descended right testis      Normal tumor markers  Reveiwed PET scan with LNs   Discussed in tumor board   Uro Onc referral discussed  Patient to see Dr. Tpaan Barrow    Route Chart for Scheduling    Med Onc Chart Routing      Follow up with physician . Audio visit 2-3 weeks   Follow up with GREGORY    Infusion scheduling note    Injection scheduling note    Labs    Imaging    Pharmacy appointment    Other referrals            Treatment Plan Information   OP TESTICULAR BEP 5-DAY Q3W   Zenobia Alston MD   Upcoming Treatment Dates - OP TESTICULAR BEP 5-DAY Q3W    No upcoming days in  selected categories.

## 2024-07-16 NOTE — PROGRESS NOTES
[facility]      GENITOURINARY MULTIDISCIPLINARY TUMOR BOARD  PATIENT REVIEW FORM     CLINIC #: [id number]  DATE: 07/16/2024    TUMOR SITE:   Testicle    ATTENDING:   Pablo    PATIENT SUMMARY:   Arturo Salguero III is a 28 y.o. male. Diagnosed March 2023, underwent radical orch. Mixed germ cell, large part embryonal and yolk sac. No nodes on imaging. PET scan concerning for metastatic RP and pelvic nodes.     S/p 3 cycles of BEP finished 07/2023. Question of bone lesion at manubrium with tracer uptake. MRI showed no concern. Has been on surveillance. CT scan continue to show an enlarged LN, PET scan last week shows minimal uptake but confirmed enlarged LN.     Markers are negative.      PERFORMANCE STATUS:  ECOG 0    Estimated GFR/CKD Stage: >60 (Cr 1.1)    FACULTY IN ATTENDANCE:    Urologic Oncology: Nino Samuels MD; Perico Shah MD, Venkatesh Fuller MD    Radiation Oncology: Jermaine Gupta MD; Navjot Lee MD    Hematology/Oncology: Snow Guallpa MD; Carlos Carter MD, Angel Benoit MD, Felecia Alston MD    Pathology: Chuy Bullard MD; Jenny Watson MD; Daly Franco MD    CONSULT NEEDED:     [] Urologic Oncology    [] Hem/Onc    [] Rad/Onc   []     [] Physical/Occupational Therapy    [] Psychology  [] Other: ___________________    [x] Treatment Guidelines (NCCN and AUA) reviewed and care planned is consistent with guidelines.    PRESENTATION AT CANCER CONFERENCE:         [] Prospective    [] Retrospective     [] Follow-Up          [] Eligible for clinical trial    TUMOR BOARD RECOMMENDATIONS/PLAN/CONSENSUS:     Case discussed among group. Pathology and radiologic images were reviewed (if applicable).    Reasonable to proceed with [facility].

## 2024-07-22 ENCOUNTER — OFFICE VISIT (OUTPATIENT)
Dept: UROLOGY | Facility: CLINIC | Age: 29
End: 2024-07-22
Payer: COMMERCIAL

## 2024-07-22 VITALS
BODY MASS INDEX: 30.08 KG/M2 | WEIGHT: 210.13 LBS | HEART RATE: 80 BPM | HEIGHT: 70 IN | DIASTOLIC BLOOD PRESSURE: 73 MMHG | SYSTOLIC BLOOD PRESSURE: 132 MMHG

## 2024-07-22 DIAGNOSIS — I82.90 VENOUS THROMBOEMBOLISM (VTE): ICD-10-CM

## 2024-07-22 DIAGNOSIS — C62.91 NON-SEMINOMATOUS CANCER OF RIGHT TESTIS: Primary | ICD-10-CM

## 2024-07-22 PROCEDURE — 99999 PR PBB SHADOW E&M-EST. PATIENT-LVL IV: CPT | Mod: PBBFAC,,, | Performed by: UROLOGY

## 2024-07-22 PROCEDURE — 3078F DIAST BP <80 MM HG: CPT | Mod: CPTII,S$GLB,, | Performed by: UROLOGY

## 2024-07-22 PROCEDURE — 1159F MED LIST DOCD IN RCRD: CPT | Mod: CPTII,S$GLB,, | Performed by: UROLOGY

## 2024-07-22 PROCEDURE — 3075F SYST BP GE 130 - 139MM HG: CPT | Mod: CPTII,S$GLB,, | Performed by: UROLOGY

## 2024-07-22 PROCEDURE — 3008F BODY MASS INDEX DOCD: CPT | Mod: CPTII,S$GLB,, | Performed by: UROLOGY

## 2024-07-22 PROCEDURE — 99215 OFFICE O/P EST HI 40 MIN: CPT | Mod: S$GLB,,, | Performed by: UROLOGY

## 2024-07-25 ENCOUNTER — TELEPHONE (OUTPATIENT)
Dept: PREADMISSION TESTING | Facility: HOSPITAL | Age: 29
End: 2024-07-25
Payer: COMMERCIAL

## 2024-07-25 NOTE — PROGRESS NOTES
[facility]  Urologic Oncology      Date of Service: 07/22/2024    Chief Complaint/Reason for Consultation:  Nonseminomatous germ cell tumor    Requesting Provider:   No referring provider defined for this encounter.    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 Salguero presents today for follow up of testicular mass.    Mr. Salguero has a history of testicular mass that was surgically removed by Dr. Shah in March of 2023. Postoperatively, patient had elevated tumor markers. In 2023, patient received chemotherapy administered by Dr. Alston. Mr. Salguero denies any current symptoms related to his condition.    Mr. Salguero's CT reveals enlarged lymph nodes in the retroperitoneum and right pelvic area. These lymph nodes were noted to be enlarged prior to the patient's testicle removal and chemotherapy treatment. The enlargement has persisted and slightly increased in size since the initial detection.    Mr. Salguero's current tumor markers are negative, indicating a positive response to previous treatment.    Mr. Salguero expresses desire to have children in the future. He has previously completed sperm banking prior to chemotherapy treatment. Mr. Salguero is aware of the potential risks to fertility associated with his upcoming retroperitoneal lymph node dissection (RPLND) surgery, including the possibility of post-operative ejaculatory issues. Despite these risks, the surgical team plans to employ nerve-sparing techniques during the procedure to maximize the chances of maintaining normal ejaculatory function.    Mr. Salguero reports being engaged to be . He denies having children at present.  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      Imaging: I have reviewed the imaging study CT chest abdomen and pelvis on 05/21/2024 personally, and agree with the findings and interpretation.     Current Problem List:  Patient Active Problem List    Diagnosis Date Noted    Venous thromboembolism (VTE) 07/20/2023    Nausea & vomiting 06/13/2023    Malignant neoplasm of descended right testis 05/05/2023        Allergies:  Review of patient's allergies indicates:   Allergen Reactions    Amoxicillin         Medications per EMR:  (Not in a hospital admission)      Past Medical History:  Past Medical History:   Diagnosis Date    Malignant neoplasm of descended right testis     VTE (venous thromboembolism)         Past Surgical History:  Past Surgical History:   Procedure Laterality Date    ORCHIECTOMY, RADICAL, INGUINAL APPROACH Right 3/31/2023    Procedure: ORCHIECTOMY, RADICAL, INGUINAL APPROACH;  Surgeon: Perico Shah MD;  Location: [facility] OR [address] [place] [address];  Service: Urology;  Laterality: Right;  90 minutes        Family History:  Family History   Problem Relation Name Age of Onset    Hypertension Father      Diabetes Brother          Social History:  Social History     Tobacco Use    Smoking status: Never    Smokeless tobacco: Never   Substance Use Topics    Alcohol use: Never          OBJECTIVE:     Vitals:    07/22/24 1441   BP: 132/73   Pulse: 80   Weight: 95.3 kg (210 lb 1.6 oz)   Height: 5' 10" (1.778 m)        Physical Exam    General: No acute distress. Nontoxic appearing.  HENT: Normocephalic. Atraumatic.  Respiratory: Normal respiratory effort. No conversational dyspnea. No audible wheezing.  Abdomen: No obvious distension.  Skin: No visible abnormalities.  Extremities: No edema upper extremities. No edema lower extremities.  Neurological: Alert and oriented x3. Normal speech.  Psychiatric: Normal mood. Normal affect. No evidence of SI.       -surgical scars absent except for the orchiectomy    LAB:    CBC:  Lab Results "   Component Value Date    WBC 6.59 05/21/2024    HGB 13.6 (L) 05/21/2024    HCT 42.6 05/21/2024    MCV 88 05/21/2024     05/21/2024         BMP:  Lab Results   Component Value Date     05/21/2024    K 4.2 05/21/2024     05/21/2024    CO2 26 05/21/2024    BUN 15 05/21/2024    CREATININE 1.0 05/21/2024    CALCIUM 9.2 05/21/2024    ANIONGAP 6 (L) 05/21/2024    EGFRNORACEVR >60.0 05/21/2024         ASSESSMENT/PLAN:   Assessment & Plan      28-year-old male with nonseminomatous germ cell tumor status post BEP x3 with residual retroperitoneal mass and normal serum tumor markers.    TESTICULAR CANCER:  - Mr. Salguero has history of testicular mass removed by Dr. Shah, with elevated tumor markers postoperatively.  - Received chemotherapy from Dr. Alston in 2023.  - Current CT shows enlarged lymph nodes in retroperitoneum and right pelvic area, present before orchiectomy and chemotherapy.  - Tumor markers now negative, indicating no need for further chemotherapy.  - Enlarged lymph nodes could represent residual testicular cancer, teratoma, or fibrosis.  - Most likely residual cancer or teratoma given growth over time.  - Recommend retroperitoneal lymph node dissection (RPLND) surgery with nerve-sparing approach given patient's young age and desire for future fertility.  - Discussed risks, benefits, and alternatives. Mr. Salguero agreeable to proceed.  - Educated patient on ejaculatory function being controlled by sympathetic nerves in the retroperitoneum.  - Explained that cutting these nerves during RPLND can lead to retrograde ejaculation and infertility, though sperm production is unaffected by surgery itself  - Discussed that nerve-sparing approach will be used to preserve ejaculatory function if possible, but there is still a risk of retrograde ejaculation.  - Explained that erectile function should not be impacted.  - Reviewed potential complications of open abdominal surgery including infection,  bleeding, injury to surrounding structures, lymphocele, chylous ascites, ileus, bowel obstruction, and fascial dehiscence.  - Discussed that mortality risk is extremely low but non-zero.  - Mr. Salguero to avoid lifting more than 20 lbs for 6 weeks after surgery to prevent hernia formation.  - Mr. Salguero may return to work on light duty or desk work 1-2 weeks after surgery if feeling well and off pain medications.  - Repeat CT ordered within 3-4 weeks prior to planned surgery.  - Repeat tumor marker labs ordered prior to planned surgery.  - Pulmonary function tests ordered prior to surgery given history of bleomycin chemotherapy.  - Follow up in first week of September for planned surgery  - Office will reach out to schedule pre-operative CT, labs, and pulmonary function tests.  - Dr. Barrow will provide work note for 8 weeks off after surgery, with driving and lifting restrictions.    We then discussed risks, benefits, alternatives associated with this surgery, including complications, detailed below:  -General anesthesia:  Associated risks include pneumonia, cerebrovascular accident, cardiac events including myocardial infarction, pulmonary embolism, deep vein thrombosis  -Retroperitoneal Lymph Node Dissection: Infection of the superficial and deep spaces, skin infection, bleeding requiring transfusion, injury to the surrounding structures, which includes the liver, gallbladder, spleen, pancreas, colon, small intestine, ureters, kidneys and the major vascular structures of the abdomen and pelvis.  We discussed incisional hernia, incisional/fascial dehiscence.  We also discussed the very rare occurrence of intraoperative or perioperative mortality.  Specific to retroperitoneal lymphadenectomy, we discussed chylous ascites, small bowel obstruction, and postoperative ileus.  We also discussed anejaculation      FOLLOW UP:  - Contact office with any questions or concerns that arise before surgery.         I spent a total  of 60 minutes on the day of the visit.This includes face to face time and non-face to face time preparing to see the patient (eg, review of tests), obtaining and/or reviewing separately obtained history, documenting clinical information in the electronic or other health record, independently interpreting results and communicating results to the patient/family/caregiver, or care coordinator  This encounter was dictated and transcribed using DeepScribe and FluencyDirect, please excuse any typographical or grammatical errors.

## 2024-07-25 NOTE — ANESTHESIA PAT ROS NOTE
07/25/2024  Arturo Salguero III is a 28 y.o., male.      Pre-op Assessment          Review of Systems           Anesthesia Assessment: Preoperative EQUATION    Planned Procedure: Procedure(s) (LRB):  LYMPHADENECTOMY, RETROPERITONEUM (N/A)  Requested Anesthesia Type:General  Surgeon: Tapan Barrow MD  Service: Urology  Known or anticipated Date of Surgery:9/3/2024    Surgeon notes: reviewed    Electronic QUestionnaire Assessment completed via nurse interview with patient.        Triage considerations:         Previous anesthesia records:GETA and No problems  03/31/23 ORCHIECTOMY, RADICAL, INGUINAL APPROACH (Right), general anesthesia, ASA 2  Intubation     Date/Time: 3/31/2023 7:10 AM  Performed by: Angel Castillo MD  Authorized by: Arti Robertson MD      Intubation:     Induction:  Intravenous    Intubated:  Postinduction    Mask Ventilation:  Easy mask    Attempts:  1    Attempted By:  Resident anesthesiologist    Method of Intubation:  Video laryngoscopy    Blade:  Alaniz 4    Laryngeal View Grade: Grade I - full view of cords      Difficult Airway Encountered?: No      Complications:  None    Airway Device:  Oral endotracheal tube    Airway Device Size:  7.5    Style/Cuff Inflation:  Cuffed (inflated to minimal occlusive pressure)    Inflation Amount (mL):  8    Tube secured:  23    Secured at:  The teeth    Placement Verified By:  Capnometry    Complicating Factors:  None    Findings Post-Intubation:  BS equal bilateral and atraumatic/condition of teeth unchanged    Last PCP note: > 1 year ago , within [facility]   Subspecialty notes: Hematology/Oncology, Urology    Other important co-morbidities: Obesity and non-seminomatous cancer of right testis s/p right radical orchiectomy (inguinal approach) 03/31/23, s/p BEP for 3 cycles completed 07/24/23, residual enlarged LN in retroperitoneum and right pelvic  area, h/o VTE         Tests already available:  No recent tests.             Instructions     Optimization:  Anesthesia Preop Clinic Assessment Indicated    Medical Opinion Indicated       Sub-specialist consult indicated: TBD       Plan:    Testing:  CMP, Hematology Profile, and T&S   Pre-anesthesia  visit       Visit focus: concerns in complex and/or prolonged anesthesia, to be determined, Taylor 3, 8.5 hour surgery, last anes 2023     Consultation:IM Perioperative Hospitalist    Navigation: Tests Scheduled.              Consults scheduled.             Results will be tracked by Preop Clinic.

## 2024-08-05 ENCOUNTER — PATIENT MESSAGE (OUTPATIENT)
Dept: UROLOGY | Facility: CLINIC | Age: 29
End: 2024-08-05
Payer: COMMERCIAL

## 2024-08-08 ENCOUNTER — OFFICE VISIT (OUTPATIENT)
Dept: HEMATOLOGY/ONCOLOGY | Facility: CLINIC | Age: 29
End: 2024-08-08
Payer: COMMERCIAL

## 2024-08-08 DIAGNOSIS — C62.11 MALIGNANT NEOPLASM OF DESCENDED RIGHT TESTIS: Primary | ICD-10-CM

## 2024-08-08 PROCEDURE — 99213 OFFICE O/P EST LOW 20 MIN: CPT | Mod: 95,,, | Performed by: INTERNAL MEDICINE

## 2024-08-08 PROCEDURE — 1160F RVW MEDS BY RX/DR IN RCRD: CPT | Mod: CPTII,95,, | Performed by: INTERNAL MEDICINE

## 2024-08-08 PROCEDURE — 1159F MED LIST DOCD IN RCRD: CPT | Mod: CPTII,95,, | Performed by: INTERNAL MEDICINE

## 2024-08-21 ENCOUNTER — HOSPITAL ENCOUNTER (OUTPATIENT)
Dept: RADIOLOGY | Facility: HOSPITAL | Age: 29
Discharge: HOME | End: 2024-08-21
Attending: UROLOGY
Payer: COMMERCIAL

## 2024-08-21 DIAGNOSIS — C62.91 NON-SEMINOMATOUS CANCER OF RIGHT TESTIS: ICD-10-CM

## 2024-08-21 PROCEDURE — 25500020 PHARM REV CODE 255: Performed by: UROLOGY

## 2024-08-21 PROCEDURE — 74177 CT ABD & PELVIS W/CONTRAST: CPT | Mod: 26,,, | Performed by: RADIOLOGY

## 2024-08-21 PROCEDURE — 74177 CT ABD & PELVIS W/CONTRAST: CPT | Mod: TC

## 2024-08-21 PROCEDURE — A9698 NON-RAD CONTRAST MATERIALNOC: HCPCS | Performed by: UROLOGY

## 2024-08-21 RX ADMIN — IOHEXOL 100 ML: 350 INJECTION, SOLUTION INTRAVENOUS at 02:08

## 2024-08-21 RX ADMIN — Medication 450 ML: at 02:08

## 2024-08-26 ENCOUNTER — HOSPITAL ENCOUNTER (OUTPATIENT)
Dept: PREADMISSION TESTING | Facility: HOSPITAL | Age: 29
Discharge: HOME | End: 2024-08-26
Attending: UROLOGY
Payer: COMMERCIAL

## 2024-08-26 ENCOUNTER — OFFICE VISIT (OUTPATIENT)
Dept: INTERNAL MEDICINE | Facility: CLINIC | Age: 29
End: 2024-08-26
Payer: COMMERCIAL

## 2024-08-26 ENCOUNTER — OFFICE VISIT (OUTPATIENT)
Dept: UROLOGY | Facility: CLINIC | Age: 29
End: 2024-08-26
Payer: COMMERCIAL

## 2024-08-26 ENCOUNTER — ANESTHESIA EVENT (OUTPATIENT)
Dept: SURGERY | Facility: HOSPITAL | Age: 29
End: 2024-08-26
Payer: COMMERCIAL

## 2024-08-26 VITALS
WEIGHT: 206.81 LBS | BODY MASS INDEX: 29.61 KG/M2 | HEIGHT: 70 IN | TEMPERATURE: 98 F | DIASTOLIC BLOOD PRESSURE: 78 MMHG | HEART RATE: 65 BPM | OXYGEN SATURATION: 98 % | SYSTOLIC BLOOD PRESSURE: 121 MMHG

## 2024-08-26 DIAGNOSIS — C62.91 NON-SEMINOMATOUS CANCER OF RIGHT TESTIS: Primary | ICD-10-CM

## 2024-08-26 DIAGNOSIS — Z86.718 HISTORY OF DEEP VEIN THROMBOSIS: ICD-10-CM

## 2024-08-26 DIAGNOSIS — Z01.818 PREOP EXAMINATION: Primary | ICD-10-CM

## 2024-08-26 DIAGNOSIS — C62.90 TESTICULAR CANCER: ICD-10-CM

## 2024-08-26 DIAGNOSIS — C62.11 MALIGNANT NEOPLASM OF DESCENDED RIGHT TESTIS: ICD-10-CM

## 2024-08-26 PROBLEM — R11.2 NAUSEA & VOMITING: Status: RESOLVED | Noted: 2023-06-13 | Resolved: 2024-08-26

## 2024-08-26 PROCEDURE — 99999 PR PBB SHADOW E&M-EST. PATIENT-LVL I: CPT | Mod: PBBFAC,,, | Performed by: UROLOGY

## 2024-08-26 PROCEDURE — 99499 UNLISTED E&M SERVICE: CPT | Mod: S$GLB,,, | Performed by: UROLOGY

## 2024-08-26 PROCEDURE — 99999 PR PBB SHADOW E&M-EST. PATIENT-LVL II: CPT | Mod: PBBFAC,,, | Performed by: HOSPITALIST

## 2024-08-26 PROCEDURE — 99215 OFFICE O/P EST HI 40 MIN: CPT | Mod: S$GLB,,, | Performed by: HOSPITALIST

## 2024-08-26 PROCEDURE — 99215 OFFICE O/P EST HI 40 MIN: CPT | Mod: S$GLB,,, | Performed by: UROLOGY

## 2024-08-26 PROCEDURE — 99999 PR PBB SHADOW E&M-EST. PATIENT-LVL I: CPT | Mod: PBBFAC,,,

## 2024-08-26 PROCEDURE — 1160F RVW MEDS BY RX/DR IN RCRD: CPT | Mod: CPTII,S$GLB,, | Performed by: HOSPITALIST

## 2024-08-26 PROCEDURE — 1159F MED LIST DOCD IN RCRD: CPT | Mod: CPTII,S$GLB,, | Performed by: HOSPITALIST

## 2024-08-26 RX ORDER — PREGABALIN 150 MG/1
150 CAPSULE ORAL
OUTPATIENT
Start: 2024-08-26 | End: 2024-08-27

## 2024-08-26 RX ORDER — CEFAZOLIN SODIUM 2 G/50ML
2 SOLUTION INTRAVENOUS
OUTPATIENT
Start: 2024-08-26

## 2024-08-26 RX ORDER — HEPARIN SODIUM 5000 [USP'U]/ML
5000 INJECTION, SOLUTION INTRAVENOUS; SUBCUTANEOUS EVERY 8 HOURS
OUTPATIENT
Start: 2024-08-26

## 2024-08-26 RX ORDER — ACETAMINOPHEN 500 MG
1000 TABLET ORAL
OUTPATIENT
Start: 2024-08-26

## 2024-08-26 RX ORDER — LIDOCAINE HYDROCHLORIDE 10 MG/ML
1 INJECTION, SOLUTION EPIDURAL; INFILTRATION; INTRACAUDAL; PERINEURAL ONCE
OUTPATIENT
Start: 2024-08-26 | End: 2024-08-26

## 2024-08-26 RX ORDER — KETOROLAC TROMETHAMINE 15 MG/ML
15 INJECTION, SOLUTION INTRAMUSCULAR; INTRAVENOUS
OUTPATIENT
Start: 2024-08-26 | End: 2024-08-29

## 2024-08-26 RX ORDER — SODIUM CHLORIDE 9 MG/ML
INJECTION, SOLUTION INTRAVENOUS CONTINUOUS
OUTPATIENT
Start: 2024-08-26

## 2024-08-26 NOTE — ASSESSMENT & PLAN NOTE
He was diagnosed with testicular cancel in the early part of 2023 and had surgery done for that and had chemotherapy    He received bleomycin for his chemotherapy that finished in July of 2023  He currently does not have any lung symptoms of cough, phlegm, breathing trouble, no coughing blood    He stays active as a  and has to work on the roofs on the condenser units  He takes stairs as well as ladders to reach the roofs to do his work    Despite his physical activities, he is not experiencing any lung symptoms    PFT May 2023   Spirometry is normal. DLCO is normal.      There was a plan for pulmonary function test to see the effects of bleomycin on his lungs but clinically he does not have any suggestions of lung problems suggesting that the bleomycin may not have adversely affected his lungs    I have discussed this with him and he was comfortable to move on with the surgery without pulmonary function testing

## 2024-08-26 NOTE — PROGRESS NOTES
Urology ([facility]) H&P  Staff: Tapan Barrow MD    CC: Nonseminomatous germ cell tumor     HPI:  Arturo Salguero III is a 28 y.o. male with a history of testicular mass that was surgically removed by Dr. Shah in March of 2023. Postoperatively, patient had elevated tumor markers. In 2023, patient received chemotherapy administered by Dr. Alston. Mr. Salguero denies any current symptoms related to his condition.     Mr. Salguero's CT reveals enlarged lymph nodes in the retroperitoneum and right pelvic area. These lymph nodes were noted to be enlarged prior to the patient's testicle removal and chemotherapy treatment. The enlargement has persisted and slightly increased in size since the initial detection.     Mr. Salguero's current tumor markers are negative, indicating a positive response to previous treatment.     Mr. Salguero is aware of the potential risks to fertility associated with his upcoming retroperitoneal lymph node dissection (RPLND) surgery, including the possibility of post-operative ejaculatory issues. Despite these risks, the surgical team plans to employ nerve-sparing techniques during the procedure to maximize the chances of maintaining normal ejaculatory function.    No longer taking Apixaban      ROS:  Neg except per HPI    Past Medical History:   Diagnosis Date    Malignant neoplasm of descended right testis     VTE (venous thromboembolism)        Past Surgical History:   Procedure Laterality Date    ORCHIECTOMY, RADICAL, INGUINAL APPROACH Right 3/31/2023    Procedure: ORCHIECTOMY, RADICAL, INGUINAL APPROACH;  Surgeon: Perico Shah MD;  Location: [facility] OR [address] [place] [address];  Service: Urology;  Laterality: Right;  90 minutes       Social History     Socioeconomic History    Marital status: Significant Other   Tobacco Use    Smoking status: Never    Smokeless tobacco: Never   Substance and Sexual Activity    Alcohol use: Never    Drug use: Never    Sexual activity: Not Currently     Partners: Female     Birth  control/protection: Condom     Social Determinants of Health     Financial Resource Strain: Low Risk  (7/15/2024)    Overall Financial Resource Strain (CARDIA)     Difficulty of Paying Living Expenses: Not very hard   Food Insecurity: No Food Insecurity (7/15/2024)    Hunger Vital Sign     Worried About Running Out of Food in the Last Year: Never true     Ran Out of Food in the Last Year: Never true   Recent Concern: Food Insecurity - Food Insecurity Present (5/9/2024)    Hunger Vital Sign     Worried About Running Out of Food in the Last Year: Sometimes true     Ran Out of Food in the Last Year: Never true   Transportation Needs: No Transportation Needs (5/9/2024)    PRAPARE - Transportation     Lack of Transportation (Medical): No     Lack of Transportation (Non-Medical): No   Physical Activity: Sufficiently Active (7/15/2024)    Exercise Vital Sign     Days of Exercise per Week: 5 days     Minutes of Exercise per Session: 100 min   Stress: No Stress Concern Present (7/15/2024)    [employer] - Occupational Stress Questionnaire     Feeling of Stress : Not at all   Housing Stability: Low Risk  (7/21/2023)    Housing Stability Vital Sign     Unable to Pay for Housing in the Last Year: No     Number of Places Lived in the Last Year: 1     Unstable Housing in the Last Year: No       Family History   Problem Relation Name Age of Onset    Hypertension Father      Diabetes Brother         Review of patient's allergies indicates:   Allergen Reactions    Amoxicillin        Current Outpatient Medications on File Prior to Visit   Medication Sig Dispense Refill    ALPRAZolam (XANAX) 0.25 MG tablet Take 1 tablet (0.25 mg total) by mouth 3 (three) times daily as needed for Anxiety (nausea). (Patient not taking: Reported on 5/21/2024) 30 tablet 0    apixaban (ELIQUIS DVT-PE TREAT 30D START) 5 mg (74 tabs) DsPk For the first 7 days take two 5 mg tablets twice daily.  After 7 days take one 5 mg tablet  twice daily. 74 tablet 0    apixaban (ELIQUIS) 5 mg Tab Take 1 tablet (5 mg total) by mouth 2 (two) times daily. 60 tablet 0    dexAMETHasone (DECADRON) 4 MG Tab Take 1 tablet (4 mg total) by mouth every 12 (twelve) hours as needed (take every 12 hours for 3 days following the chemotherapy with cisplatin) (Patient not taking: Reported on 5/21/2024) 120 tablet 0    doxycycline (VIBRA-TABS) 100 MG tablet Take 1 tablet (100 mg total) by mouth 2 (two) times daily. 10 each 0    LIDOcaine-prilocaine (EMLA) cream Apply topically as needed (apply to site 45-60 minutes prior to port access). 30 g 5    OLANZapine (ZYPREXA) 5 MG tablet Take 1 tablet by mouth every night x 7 days starting day 1 of each chemo cycle or as otherwise stated (Patient not taking: Reported on 5/21/2024) 30 tablet 0    promethazine (PHENERGAN) 25 MG tablet Take 1 tablet (25 mg total) by mouth every 6 (six) hours as needed for Nausea. (For breakthrough nausea if zofran is not working) (Patient not taking: Reported on 5/21/2024) 30 tablet 3     No current facility-administered medications on file prior to visit.       Anticoagulation:  Yes; apixaban 5 mg BID    Physical Exam:  General: No acute distress, well developed. AAOx3  Head: Normocephalic, Atraumatic  Eyes: Extra-occular movements intact, No discharge  Neck: supple, symmetrical, trachea midline  Lungs: normal respiratory effort, no respiratory distress, no wheezes  CV: regular rate, 2+ pulses  Abdomen: soft, non-tender, non-distended, no organomegaly, right inguinal scar  MSK: no edema, no deformities, normal ROM  Skin: skin color, texture, turgor normal.  Neurologic: no focal deficits, sensation intact    Labs:    Urine dipstick today - negative for all components    Lab Results   Component Value Date    WBC 6.59 05/21/2024    HGB 13.6 (L) 05/21/2024    HCT 42.6 05/21/2024    MCV 88 05/21/2024     05/21/2024       BMP  Lab Results   Component Value Date     05/21/2024    K 4.2  05/21/2024     05/21/2024    CO2 26 05/21/2024    BUN 15 05/21/2024    CREATININE 1.0 05/21/2024    CALCIUM 9.2 05/21/2024    ANIONGAP 6 (L) 05/21/2024    EGFRNORACEVR >60.0 05/21/2024       Imaging:    CT A/P 08/21/24  Reproductive organs: Status post right orchidectomy with testicular prosthesis in place.     Lymph nodes: Lower abdomen pre-aortic lymph node measuring 4.9 x 2.8 x 1.2 cm previously 5.1 x 3.0 x 1.6 cm.       Assessment: Arturo Salguero III is a 28 y.o. 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 To OR on 09/03/24 for RPLND   2. Consents signed   3. I have explained the risk, benefits, and alternatives of the procedure in detail. The patient voices understanding and all questions have been answered. The patient agrees to proceed as planned.   4. Saw Dr. Barrow today as well.   5. Pre- op clearance with Hao today. Messaged Hao to order pulmonary function tests during his clinic visit.    Nicole De La Cruz MD

## 2024-08-26 NOTE — OUTPATIENT SUBJECTIVE & OBJECTIVE
Outpatient Subjective & Objective     Chief complaint-Preoperative evaluation, Perioperative Medical management, complication reduction plan     Active cardiac conditions- none    Revised cardiac risk index predictors- high-risk type of surgery    Functional capacity -Examples of physical activity , active person, he can easily take a flight of stairs---- He can undertake all the above activities without  chest pain,chest tightness, Shortness of breath ,dizziness,lightheadedness making his exercise tolerance more,   than 4 Mets.       Review of Systems   Constitutional:  Negative for chills and fever.        No unusual weight changes     HENT:          STOPBANG score  / 8      Neck size over 40 CM  Male gender   Eyes:         No unusual vision changes   Respiratory:          No cough , phlegm    No Hemoptysis   Cardiovascular:         As noted   Gastrointestinal:         Bowels- Regular / constipated  No overt GI/ blood losses   Endocrine:        Prednisone use > 20 mg daily for 3 weeks- none   Genitourinary:  Negative for dysuria.        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   Neurological:  Negative for syncope.        No unilateral weakness   Hematological:         Current use of Anticoagulants  None   Psychiatric/Behavioral:          No Depression,Anxiety         No past medical history pertinent negatives.        No anesthesia, bleeding, cardiac problems, PONV with previous surgeries/procedures.  Medications and Allergies reviewed in epic.   FH- No anesthesia,bleeding / venous thrombosis ,  heart disease in family    Physical Exam        Physical Exam  Constitutional-   General appearance-Conscious,Coherent  Eyes- No conjunctival icterus,pupils  round  and reactive to light   ENT-Oral cavity- moist     , Hearing grossly normal   Neck- No thyromegaly ,Trachea -central, No jugular venous distension,   No Carotid Bruit   Cardiovascular -Heart Sounds- Normal  and  no murmur   , No gallop rhythm   Respiratory - Normal Respiratory Effort, Normal breath sounds,  no wheeze , and  no forced expiratory wheeze    Peripheral pitting pedal edema-- none , no calf pain   Gastrointestinal -Soft abdomen, No palpable masses, Non Tender,Liver,Spleen not palpable. No-- free fluid and shifting dullness  Musculoskeletal- No finger Clubbing. Strength grossly normal   Lymphatic-No Palpable cervical, axillary,Inguinal lymphadenopathy   Psychiatric - normal effect,Orientation  Rt Dorsalis pedis pulses-palpable    Lt Dorsalis pedis pulses- palpable   Rt Posterior tibial pulses -palpable   Left posterior tibial pulses -palpable   Miscellaneous -  no renal bruit     Investigations  Lab and Imaging have been reviewed in epic.    Review of Medicine tests    EKG- I had independently reviewed the EKG from--8/26/2023  It was reported to be showing     Sinus tachycardia   Otherwise normal ECG   No previous ECGs available     Review of clinical lab tests:  Lab Results   Component Value Date    CREATININE 1.0 05/21/2024    HGB 13.6 (L) 05/21/2024     05/21/2024         Review of old records- Was done and information gathered regards to events leading to surgery and health conditions of significance in the perioperative period         Review of old records- Was done and information gathered regards to events leading to surgery and health conditions of significance in the perioperative period.    Outpatient Subjective & Objective

## 2024-08-26 NOTE — PROGRESS NOTES
[facility]  Progress Note    Patient Name: Arturo Salguero III  MRN: [id number]  Date of Evaluation- 08/30/2024  PCP- Glen Andrade MD    Future cases for Arturo Salguero III [[id number]]       Case ID Status Date Time Tommie Procedure Provider Location    [id number] [facility] 9/3/2024  7:00  LYMPHADENECTOMY, RETROPERITONEUM Tapan Barrow MD [[id number]] [facility] OR 2ND FLR            HPI:  History of present illness- I had the pleasure of meeting this pleasant 28 y.o. gentleman in the pre op clinic prior to his elective Urological surgery. The patient is new to me .   Offered to have family to be on the phone during the consultation    I have obtained the history by speaking to the patient and by reviewing the electronic health records.    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, now status post BEP for 3 cycles completed on 07/24/2023, with residual retroperitoneal masses   He did well with chemotherapy-  He had 3 cycles of 3 week each chemo  He would lose weight during the 1st week of each cycle due to not been able to eat due to nausea from chemotherapy, but he had gained weight back during the rest of the cycles  He lost about 20 lb of weight in total but he is back to where he was prior to chemotherapy      To his understanding his lymph glands are getting larger hence why he is having surgery    He does not have any pain from this  He has no bowel  symptoms and is eating good      Relevant health conditions of significance for the perioperative period/ History of presenting illness -    Deep vein thrombosis  BMI- 29.67    Not known to have HTN, heart problem , Prediabetes , Diabetes Mellitus, Lung problem, Thyroid problem, Kidney problem, , pulmonary embolism, acid reflux, sleep apnea, COVID infection, fatty liver , blood vessels stent , tobacco smoking, edema, mental health problems     Lives with her significant other in a town house with living and kitchen downstairs and bedrooms upstairs  Works as a  -physical job  Pets- 2 cats  Children - none  Has help post op                  Subjective/ Objective:     Chief complaint-Preoperative evaluation, Perioperative Medical management, complication reduction plan     Active cardiac conditions- none    Revised cardiac risk index predictors- high-risk type of surgery    Functional capacity -Examples of physical activity , active person, he can easily take a flight of stairs---- He can undertake all the above activities without  chest pain,chest tightness, Shortness of breath ,dizziness,lightheadedness making his exercise tolerance more,   than 4 Mets.       Review of Systems   Constitutional:  Negative for chills and fever.        No unusual weight changes     HENT:          STOPBANG score  / 8      Neck size over 40 CM  Male gender   Eyes:         No unusual vision changes   Respiratory:          No cough , phlegm    No Hemoptysis   Cardiovascular:         As noted   Gastrointestinal:         Bowels- Regular / constipated  No overt GI/ blood losses   Endocrine:        Prednisone use > 20 mg daily for 3 weeks- none   Genitourinary:  Negative for dysuria.        No urinary hesitancy    Musculoskeletal:         He takes occasional Tylenol/Advil for his musculoskeletal pains from his physical job and I suggested not to take Advil 1 week prior to surgery  No unusual muscle/ joint pains   Skin:   Negative for rash.   Neurological:  Negative for syncope.        No unilateral weakness   Hematological:         Current use of Anticoagulants  None   Psychiatric/Behavioral:          No Depression,Anxiety         No past medical history pertinent negatives.        No anesthesia, bleeding, cardiac problems, PONV with previous surgeries/procedures.  Medications and Allergies reviewed in epic.   FH- No anesthesia,bleeding / venous thrombosis ,  heart disease in family    Physical Exam        Physical Exam  Constitutional-   General appearance-Conscious,Coherent  Eyes- No conjunctival icterus,pupils  round  and reactive to light   ENT-Oral cavity- moist    , Hearing grossly normal   Neck- No thyromegaly ,Trachea -central, No jugular venous distension,   No Carotid Bruit   Cardiovascular -Heart Sounds- Normal  and  no murmur   , No gallop rhythm   Respiratory - Normal Respiratory Effort, Normal breath sounds,  no wheeze , and  no forced expiratory wheeze    Peripheral pitting pedal edema-- none , no calf pain   Gastrointestinal -Soft abdomen, No palpable masses, Non Tender,Liver,Spleen not palpable. No-- free fluid and shifting dullness  Musculoskeletal- No finger Clubbing. Strength grossly normal   Lymphatic-No Palpable cervical, axillary,Inguinal lymphadenopathy   Psychiatric - normal effect,Orientation  Rt Dorsalis pedis pulses-palpable    Lt Dorsalis pedis pulses- palpable   Rt Posterior tibial pulses -palpable   Left posterior tibial pulses -palpable   Miscellaneous -  no renal bruit     Investigations  Lab and Imaging have been reviewed in [facility].    Review of Medicine tests    EKG- I had independently reviewed the EKG from--8/26/2023  It was reported to be showing     Sinus tachycardia   Otherwise normal ECG   No previous ECGs available     Review of clinical lab tests:  Lab Results   Component Value Date    CREATININE 1.0 05/21/2024    HGB 13.6 (L) 05/21/2024     05/21/2024         Review of old records-  Was done and information gathered regards to events leading to surgery and health conditions of significance in the perioperative period         Review of old records- Was done and information gathered regards to events leading to surgery and health conditions of significance in the perioperative period.        Preoperative cardiac risk assessment-  The patient does not have any active cardiac conditions . Revised cardiac risk index predictors- -1--.Functional capacity is more than 4 Mets. He will be undergoing a Urological procedure that carries a High Risk risk     Risk of a major Cardiac event ( Defined as death, myocardial infarction, or cardiac arrest at 30 days after noncardiac surgery), based on RCRI score   -6.0%         No further cardiac work up is indicated prior to proceeding with the surgery          American Society of Anesthesiologists Physical status classification ( ASA ) class: 2     Postoperative pulmonary complication risk assessment:      ARISCAT ( Canet) risk index- risk class -  Low, if duration of surgery is under 2 hours, intermediate, if duration of surgery is over 2 hours - if surgical incision involves upper abdomen         Assessment/Plan:     History of deep vein thrombosis  7/20/2023    Thrombosis of the right internal jugular vein. Please note, there is significantly slow flow through the subclavian vein, impending thrombosis should be considered.     CT chest 7/20/2023    Motion artifact limits evaluation for pulmonary thromboembolism. There is questionable pulmonary thromboembolism within segmental branches to the right lower lobe noting extensive motion artifact limits evaluation.     He had port placed on his right upper chest for chemotherapy administration due to testicular cancer and around the time of blood clot diagnosis he felt pain down his right arm.  He also had swelling in the right upper extremity at that time and further evaluation led to the diagnosis of deep vein  thrombosis.  Pulmonary embolism could not be excluded    He was treated with apixaban and is port was removed as he completed the chemotherapy    8/22/2023- Nonocclusive DVT in the right internal jugular and subclavian veins      8/31/2024- Removal of right IJ port.     Episode- 1  Associated with no reduced mobility around the time of the thrombosis, no long journeys, cancer around the time of thrombosis, prior surgery around the time thrombosis, no Hospital stay around the time of thrombosis, no HRT  IVC filter  - none    From what I gather from him, his deep vein thrombosis and possible pulmonary embolism may have been provoked in the setting of testicular cancer, port related  He no longer has any right arm swelling and his right arm is back to normal    Increased risk of thrombosis in the rhina operative period , compression stocking use discussed         Malignant neoplasm of descended right testis  He was diagnosed with testicular cancel in the early part of 2023 and had surgery done for that and had chemotherapy    He received bleomycin for his chemotherapy that finished in July of 2023  He currently does not have any lung symptoms of cough, phlegm, breathing trouble, no coughing blood    He stays active as a  and 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 DLCO is normal.      There was a plan for pulmonary function test to see the effects of bleomycin on his lungs but clinically he does not have any suggestions of lung problems suggesting that the bleomycin may not have adversely affected his lungs    I have discussed this with him and he was comfortable to move on with the surgery without pulmonary function testing    BMI 29.0-29.9,adult  Weight related conditions       Not troubled with / Not known to  have     HTN  Type 2 Diabetes   Prediabetes   Gout   Hyperlipidemia   Sleep apnea   Acid reflux   Fatty liver   Osteoarthritis    Encouraged maintaining healthy weight for improved health         Preventive perioperative care    Thromboembolic prophylaxis:  His risk factors for thrombosis include surgical procedure, previous history of thrombosis, and age.I suggest  thromboembolic prophylaxis ( mechanical/pharmacological, weighing the risk benefits of pharmacological agent use considering rhina procedural bleeding )  during the perioperative period.I suggested being active in the post operative period.      Postoperative pulmonary complication prophylaxis-Risk factors for post operative pulmonary complications include surgery lasting over 3 hours and proximity of the surgical site to the lungs- I suggest incentive spirometry use, early ambulation, end tidal carbon dioxide monitoring, and pain control so as to avoid diaphragmatic splinting  , oral care , head end of bed elevation      Renal complication prophylaxis- . I suggest keeping him well hydrated  in the perioperative period.     Surgical site Infection Prophylaxis-I  suggest appropriate antibiotic for Prophylaxis against Surgical site infections  Skin antibacterial discussed         In view of urological procedure the patient  is at risk of postoperative urinary retention.  I suggest avoidance / minimizing the of  Benzodiazepines,Anticholinergic medication,antihistamines ( Benadryl) , if possible in the perioperative period. I suggest using the minimum possible use of opioids for the minimum period of time in the perioperative period. Benadryl avoidance suggested      This visit was focused on Preoperative evaluation, Perioperative Medical management, complication reduction plans. I suggest that the patient follows up with primary care or relevant sub specialists for ongoing health care.    I appreciate the opportunity to be involved in this patients care.  Please feel free to contact me if there were any questions about this consultation.    Patient is optimized    Patient/ care giver/ Family member was instructed to call and update me about any changes to health,  medication, office visits ,testing out side of the [facility] operative care center , hospitalizations between now and surgery      Jackelin Bui MD  Internal Medicine  [facility]   Cell Phone- [phone]    History of COVID -no       COVID screening     No fever   No cough   No SOB  No sore throat   No loss of taste or smell   No muscle aches   No nausea, vomiting , diarrhea   -    Messaged the surgeon and the oncologist about venous thromboembolism prophylaxis given the previous blood clot he has had  Messaged the surgeon and the oncologist that clinically he does not have any suggestions of bleomycin adversely affecting his lungs    I have spent ---70--- minutes of time which includes, time spent to prepare to see the patient , obtaining history ,performing examination, counseling/Educating the patient , Documenting clinical information in the record    --  8/26/2024- 1637     Checked for over-the-counter medication    Corresponded with the surgeon, oncologist  Plan to proceed with the surgery  Plan for prophylactic anticoagulation postop, but if he has no symptoms or lines may decide to discontinue after discharge   --  8/30/2024- 1749    Lab from August 26 showed improved hemoglobin    As per the information gathered from him during my initial evaluation    No overt GI/ blood losses  No stomach upset/ ulcer   No Colon cancer in family   No long standing NSAID use  Discussed follow up     I suggest monitoring the hemoglobin in the perioperative period     Called to follow up , spoke to him to address any concerns with the up coming surgery or any questions on Medication instructions -  Doing well ,No changes to Medication, Health -    Creatinine mildly elevated-noted recent IV  contrast use  Stays hydrated  He is not taking long standing anti-inflammatory medication  Urinating well  Suggested follow up

## 2024-08-26 NOTE — ASSESSMENT & PLAN NOTE
7/20/2023    Thrombosis of the right internal jugular vein. Please note, there is significantly slow flow through the subclavian vein, impending thrombosis should be considered.     CT chest 7/20/2023    Motion artifact limits evaluation for pulmonary thromboembolism. There is questionable pulmonary thromboembolism within segmental branches to the right lower lobe noting extensive motion artifact limits evaluation.     He had port placed on his right upper chest for chemotherapy administration due to testicular cancer and around the time of blood clot diagnosis he felt pain down his right arm.  He also had swelling in the right upper extremity at that time and further evaluation led to the diagnosis of deep vein thrombosis.  Pulmonary embolism could not be excluded    He was treated with apixaban and is port was removed as he completed the chemotherapy    8/22/2023- Nonocclusive DVT in the right internal jugular and subclavian veins      8/31/2024- Removal of right IJ port.     Episode- 1  Associated with no reduced mobility around the time of the thrombosis, no long journeys, cancer around the time of thrombosis, prior surgery around the time thrombosis, no Hospital stay around the time of thrombosis, no HRT  IVC filter  - none    From what I gather from him, his deep vein thrombosis and possible pulmonary embolism may have been provoked in the setting of testicular cancer, port related  He no longer has any right arm swelling and his right arm is back to normal    Increased risk of thrombosis in the rhina operative period , compression stocking use discussed

## 2024-08-26 NOTE — ANESTHESIA PREPROCEDURE EVALUATION
[facility]  Anesthesia Pre-Operative Evaluation         Patient Name: Arturo Salguero III  YOB: 1995  MRN: [id number]    SUBJECTIVE:     Pre-operative evaluation for Procedure(s) (LRB):  LYMPHADENECTOMY, RETROPERITONEUM (N/A)     08/26/2024    Arturo Salguero III is a 28 y.o. male with a significant medical history of testicular ca s/p orchiectomy, DVT s/p treatment in 8/2023  who presents for the above procedure.    LDA: None documented.       Prev airway:   Date/Time: 3/31/2023 7:10 AM  Performed by: Angel Castillo MD  Authorized by: Arti Robertson MD      Intubation:     Induction:  Intravenous    Intubated:  Postinduction    Mask Ventilation:  Easy mask    Attempts:  1    Attempted By:  Resident anesthesiologist    Method of Intubation:  Video laryngoscopy    Blade:  Alaniz 4    Laryngeal View Grade: Grade I - full view of cords      Difficult Airway Encountered?: No      Complications:  None    Airway Device:  Oral endotracheal tube    Airway Device Size:  7.5    Style/Cuff Inflation:  Cuffed (inflated to minimal occlusive pressure)    Inflation Amount (mL):  8    Tube secured:  23    Secured at:  The teeth    Placement Verified By:  Capnometry    Complicating Factors:  None    Findings Post-Intubation:  BS equal bilateral and atraumatic/condition of teeth unchanged    Drips: None documented.      Patient Active Problem List   Diagnosis    Malignant neoplasm of descended right testis    History of deep vein thrombosis    BMI 29.0-29.9,adult       Review of patient's allergies indicates:   Allergen Reactions    Amoxicillin      Rash       Current Inpatient Medications:      Current Outpatient Medications on File Prior to Encounter   Medication Sig Dispense Refill    ALPRAZolam (XANAX) 0.25 MG tablet Take 1 tablet (0.25 mg total) by mouth 3 (three) times daily as needed for  Anxiety (nausea). (Patient not taking: Reported on 5/21/2024) 30 tablet 0    apixaban (ELIQUIS DVT-PE TREAT 30D START) 5 mg (74 tabs) DsPk For the first 7 days take two 5 mg tablets twice daily.  After 7 days take one 5 mg tablet twice daily. 74 tablet 0    apixaban (ELIQUIS) 5 mg Tab Take 1 tablet (5 mg total) by mouth 2 (two) times daily. 60 tablet 0    dexAMETHasone (DECADRON) 4 MG Tab Take 1 tablet (4 mg total) by mouth every 12 (twelve) hours as needed (take every 12 hours for 3 days following the chemotherapy with cisplatin) (Patient not taking: Reported on 5/21/2024) 120 tablet 0    doxycycline (VIBRA-TABS) 100 MG tablet Take 1 tablet (100 mg total) by mouth 2 (two) times daily. 10 each 0    LIDOcaine-prilocaine (EMLA) cream Apply topically as needed (apply to site 45-60 minutes prior to port access). 30 g 5    OLANZapine (ZYPREXA) 5 MG tablet Take 1 tablet by mouth every night x 7 days starting day 1 of each chemo cycle or as otherwise stated (Patient not taking: Reported on 5/21/2024) 30 tablet 0    promethazine (PHENERGAN) 25 MG tablet Take 1 tablet (25 mg total) by mouth every 6 (six) hours as needed for Nausea. (For breakthrough nausea if zofran is not working) (Patient not taking: Reported on 5/21/2024) 30 tablet 3     No current facility-administered medications on file prior to encounter.       Past Surgical History:   Procedure Laterality Date    ORCHIECTOMY, RADICAL, INGUINAL APPROACH Right 3/31/2023    Procedure: ORCHIECTOMY, RADICAL, INGUINAL APPROACH;  Surgeon: Perico Shah MD;  Location: [facility] OR [address] [place] [address];  Service: Urology;  Laterality: Right;  90 minutes       Social History     Socioeconomic History    Marital status: Significant Other   Tobacco Use    Smoking status: Never    Smokeless tobacco: Never   Substance and Sexual Activity    Alcohol use: Never    Drug use: Never    Sexual activity: Not Currently     Partners: Female     Birth control/protection: Condom     Social Determinants  "of Health     Financial Resource Strain: Low Risk  (7/15/2024)    Overall Financial Resource Strain (CARDIA)     Difficulty of Paying Living Expenses: Not very hard   Food Insecurity: No Food Insecurity (7/15/2024)    Hunger Vital Sign     Worried About Running Out of Food in the Last Year: Never true     Ran Out of Food in the Last Year: Never true   Recent Concern: Food Insecurity - Food Insecurity Present (5/9/2024)    Hunger Vital Sign     Worried About Running Out of Food in the Last Year: Sometimes true     Ran Out of Food in the Last Year: Never true   Transportation Needs: No Transportation Needs (5/9/2024)    PRAPARE - Transportation     Lack of Transportation (Medical): No     Lack of Transportation (Non-Medical): No   Physical Activity: Sufficiently Active (7/15/2024)    Exercise Vital Sign     Days of Exercise per Week: 5 days     Minutes of Exercise per Session: 100 min   Stress: No Stress Concern Present (7/15/2024)    [employer] [place] [employer] - Occupational Stress Questionnaire     Feeling of Stress : Not at all   Housing Stability: Low Risk  (7/21/2023)    Housing Stability Vital Sign     Unable to Pay for Housing in the Last Year: No     Number of Places Lived in the Last Year: 1     Unstable Housing in the Last Year: No       OBJECTIVE:     Vital Signs Range:      5/21/2024     1:32 PM 7/22/2024     2:41 PM 8/26/2024    12:57 PM   Vitals - 1 value per visit   SYSTOLIC 133 132 121   DIASTOLIC 69 73 78   Pulse 75 80 65   Temp 36.8 °C (98.2 °F)  36.6 °C (97.9 °F)   Resp 16     SPO2 98 %  98 %   Weight (lb) 210.98 210.1 206.79   Weight (kg) 95.7 95.3 93.8   Height 5' 10" (1.778 m) 5' 10" (1.778 m) 5' 10" (1.778 m)   BMI (Calculated) 30.3 30.1 29.7   Pain Score Zero Zero          CBC:   Lab Results   Component Value Date    WBC 6.59 05/21/2024    HGB 13.6 (L) 05/21/2024    HCT 42.6 05/21/2024    MCV 88 05/21/2024     05/21/2024         CMP:   Sodium   Date Value Ref Range Status "   05/21/2024 138 136 - 145 mmol/L Final     Potassium   Date Value Ref Range Status   05/21/2024 4.2 3.5 - 5.1 mmol/L Final     Chloride   Date Value Ref Range Status   05/21/2024 106 95 - 110 mmol/L Final     CO2   Date Value Ref Range Status   05/21/2024 26 23 - 29 mmol/L Final     Glucose   Date Value Ref Range Status   05/21/2024 97 70 - 110 mg/dL Final     BUN   Date Value Ref Range Status   05/21/2024 15 6 - 20 mg/dL Final     Creatinine   Date Value Ref Range Status   05/21/2024 1.0 0.5 - 1.4 mg/dL Final     Calcium   Date Value Ref Range Status   05/21/2024 9.2 8.7 - 10.5 mg/dL Final     Total Protein   Date Value Ref Range Status   05/21/2024 7.0 6.0 - 8.4 g/dL Final     Albumin   Date Value Ref Range Status   05/21/2024 4.2 3.5 - 5.2 g/dL Final     Total Bilirubin   Date Value Ref Range Status   05/21/2024 0.3 0.1 - 1.0 mg/dL Final     Comment:     For infants and newborns, interpretation of results should be based  on gestational age, weight and in agreement with clinical  observations.    Premature Infant recommended reference ranges:  Up to 24 hours.............<8.0 mg/dL  Up to 48 hours............<12.0 mg/dL  3-5 days..................<15.0 mg/dL  6-29 days.................<15.0 mg/dL       Alkaline Phosphatase   Date Value Ref Range Status   05/21/2024 68 55 - 135 U/L Final     AST   Date Value Ref Range Status   05/21/2024 30 10 - 40 U/L Final     ALT   Date Value Ref Range Status   05/21/2024 24 10 - 44 U/L Final     Anion Gap   Date Value Ref Range Status   05/21/2024 6 (L) 8 - 16 mmol/L Final     eGFR if    Date Value Ref Range Status   06/01/2021 >60.0 >60 mL/min/1.73 m^2 Final     eGFR if non    Date Value Ref Range Status   06/01/2021 >60.0 >60 mL/min/1.73 m^2 Final     Comment:     Calculation used to obtain the estimated glomerular filtration  rate (eGFR) is the CKD-EPI equation.          INR:  Lab Results   Component Value Date    INR 0.9 08/30/2023    INR  0.9 07/20/2023    INR 0.9 07/20/2023       EKG:   Results for orders placed or performed during the hospital encounter of 07/20/23   EKG 12-lead    Collection Time: 07/20/23  4:20 PM    Narrative    Test Reason : R00.0,    Vent. Rate : 107 BPM     Atrial Rate : 107 BPM     P-R Int : 138 ms          QRS Dur : 074 ms      QT Int : 326 ms       P-R-T Axes : 072 071 054 degrees     QTc Int : 435 ms    Sinus tachycardia  Otherwise normal ECG  No previous ECGs available  Confirmed by Johny Kelley MD ([id number]) on 7/20/2023 4:46:32 PM    Referred By: [email or web address]   SELF           Confirmed By:Johny Kelley MD        2D ECHO:   No results found for this or any previous visit.         ASSESSMENT/PLAN:           Pre-op Assessment    I have reviewed the Patient Summary Reports.     I have reviewed the Nursing Notes. I have reviewed the NPO Status.   I have reviewed the Medications.     Review of Systems  Anesthesia Hx:  No problems with previous Anesthesia   History of prior surgery of interest to airway management or planning:  Previous anesthesia: General        Denies Family Hx of Anesthesia complications.    Denies Personal Hx of Anesthesia complications.                    Social:  Non-Smoker, No Alcohol Use       Hematology/Oncology:                   Hematology Comments: DVT 8/2023 s/p treatment    Current/Recent Cancer.         chemotherapy and surgery   Oncology Comments: Testicular ca s/p orchiectomy     Cardiovascular:      Denies Hypertension.   Denies MI.  Denies CAD.       Denies Angina.  Denies CHF.      Denies CARDOZA.                            Renal/:   Denies Chronic Renal Disease.                Hepatic/GI:      Denies GERD. Denies Liver Disease.            Neurological:    Denies CVA.    Denies Seizures.                                Endocrine:  Denies Diabetes.               Physical Exam  General: Well nourished, Cooperative and Alert    Airway:  Mallampati: III / II  Mouth Opening: Normal  TM Distance:  Normal  Tongue: Normal  Neck ROM: Normal ROM    Dental:  Intact    Chest/Lungs:  Normal Respiratory Rate    Heart:  Rate: Normal        Anesthesia Plan  Type of Anesthesia, risks & benefits discussed:    Anesthesia Type: Gen ETT  Intra-op Monitoring Plan: Standard ASA Monitors  Post Op Pain Control Plan: multimodal analgesia  Induction:  IV  Airway Plan: Direct, Post-Induction  Informed Consent: Informed consent signed with the Patient and all parties understand the risks and agree with anesthesia plan.  All questions answered.   ASA Score: 3  Day of Surgery Review of History & Physical: H&P Update referred to the surgeon/provider.    Ready For Surgery From Anesthesia Perspective.     .

## 2024-08-26 NOTE — HPI
History of present illness- I had the pleasure of meeting this pleasant 28 y.o. gentleman in the pre op clinic prior to his elective Urological surgery. The patient is new to me .   Offered to have family to be on the phone during the consultation    I have obtained the history by speaking to the patient and by reviewing the electronic health records.    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, now status post BEP for 3 cycles completed on 07/24/2023, with residual retroperitoneal masses   He did well with chemotherapy-  He had 3 cycles of 3 week each chemo  He would lose weight during the 1st week of each cycle due to not been able to eat due to nausea from chemotherapy, but he had gained weight back during the rest of the cycles  He lost about 20 lb of weight in total but he is back to where he was prior to chemotherapy      To his understanding his lymph glands are getting larger hence why he is having surgery    He does not have any pain from this  He has no bowel symptoms and is eating good      Relevant health conditions of significance for the perioperative period/ History of presenting illness -    Deep vein thrombosis  BMI- 29.67    Not known to have HTN, heart problem , Prediabetes , Diabetes Mellitus, Lung problem, Thyroid problem, Kidney problem, , pulmonary embolism, acid reflux, sleep apnea, COVID infection, fatty liver , blood vessels stent ,  tobacco smoking, edema, mental health problems     Lives with her significant other in a town house with living and kitchen downstairs and bedrooms upstairs  Works as a  -physical job  Pets- 2 cats  Children - none  Has help post op

## 2024-08-26 NOTE — H&P (VIEW-ONLY)
Urology ([facility]) H&P  Staff: Tapan Barrow MD    CC: Nonseminomatous germ cell tumor     HPI:  Arturo Salguero III is a 28 y.o. male with a history of testicular mass that was surgically removed by Dr. Shah in March of 2023. Postoperatively, patient had elevated tumor markers. In 2023, patient received chemotherapy administered by Dr. Alston. Mr. Salguero denies any current symptoms related to his condition.     Mr. Salguero's CT reveals enlarged lymph nodes in the retroperitoneum and right pelvic area. These lymph nodes were noted to be enlarged prior to the patient's testicle removal and chemotherapy treatment. The enlargement has persisted and slightly increased in size since the initial detection.     Mr. Salguero's current tumor markers are negative, indicating a positive response to previous treatment.     Mr. Salguero is aware of the potential risks to fertility associated with his upcoming retroperitoneal lymph node dissection (RPLND) surgery, including the possibility of post-operative ejaculatory issues. Despite these risks, the surgical team plans to employ nerve-sparing techniques during the procedure to maximize the chances of maintaining normal ejaculatory function.    No longer taking Apixaban      ROS:  Neg except per HPI    Past Medical History:   Diagnosis Date    Malignant neoplasm of descended right testis     VTE (venous thromboembolism)        Past Surgical History:   Procedure Laterality Date    ORCHIECTOMY, RADICAL, INGUINAL APPROACH Right 3/31/2023    Procedure: ORCHIECTOMY, RADICAL, INGUINAL APPROACH;  Surgeon: Perico Shah MD;  Location: [facility] OR [address] [place] [address];  Service: Urology;  Laterality: Right;  90 minutes       Social History     Socioeconomic History    Marital status: Significant Other   Tobacco Use    Smoking status: Never    Smokeless tobacco: Never   Substance and Sexual Activity    Alcohol use: Never    Drug use: Never    Sexual activity: Not Currently     Partners: Female     Birth  control/protection: Condom     Social Determinants of Health     Financial Resource Strain: Low Risk  (7/15/2024)    Overall Financial Resource Strain (CARDIA)     Difficulty of Paying Living Expenses: Not very hard   Food Insecurity: No Food Insecurity (7/15/2024)    Hunger Vital Sign     Worried About Running Out of Food in the Last Year: Never true     Ran Out of Food in the Last Year: Never true   Recent Concern: Food Insecurity - Food Insecurity Present (5/9/2024)    Hunger Vital Sign     Worried About Running Out of Food in the Last Year: Sometimes true     Ran Out of Food in the Last Year: Never true   Transportation Needs: No Transportation Needs (5/9/2024)    PRAPARE - Transportation     Lack of Transportation (Medical): No     Lack of Transportation (Non-Medical): No   Physical Activity: Sufficiently Active (7/15/2024)    Exercise Vital Sign     Days of Exercise per Week: 5 days     Minutes of Exercise per Session: 100 min   Stress: No Stress Concern Present (7/15/2024)    [employer] [place] [employer] - Occupational Stress Questionnaire     Feeling of Stress : Not at all   Housing Stability: Low Risk  (7/21/2023)    Housing Stability Vital Sign     Unable to Pay for Housing in the Last Year: No     Number of Places Lived in the Last Year: 1     Unstable Housing in the Last Year: No       Family History   Problem Relation Name Age of Onset    Hypertension Father      Diabetes Brother         Review of patient's allergies indicates:   Allergen Reactions    Amoxicillin        Current Outpatient Medications on File Prior to Visit   Medication Sig Dispense Refill    ALPRAZolam (XANAX) 0.25 MG tablet Take 1 tablet (0.25 mg total) by mouth 3 (three) times daily as needed for Anxiety (nausea). (Patient not taking: Reported on 5/21/2024) 30 tablet 0    apixaban (ELIQUIS DVT-PE TREAT 30D START) 5 mg (74 tabs) DsPk For the first 7 days take two 5 mg tablets twice daily.  After 7 days take one 5 mg tablet  twice daily. 74 tablet 0    apixaban (ELIQUIS) 5 mg Tab Take 1 tablet (5 mg total) by mouth 2 (two) times daily. 60 tablet 0    dexAMETHasone (DECADRON) 4 MG Tab Take 1 tablet (4 mg total) by mouth every 12 (twelve) hours as needed (take every 12 hours for 3 days following the chemotherapy with cisplatin) (Patient not taking: Reported on 5/21/2024) 120 tablet 0    doxycycline (VIBRA-TABS) 100 MG tablet Take 1 tablet (100 mg total) by mouth 2 (two) times daily. 10 each 0    LIDOcaine-prilocaine (EMLA) cream Apply topically as needed (apply to site 45-60 minutes prior to port access). 30 g 5    OLANZapine (ZYPREXA) 5 MG tablet Take 1 tablet by mouth every night x 7 days starting day 1 of each chemo cycle or as otherwise stated (Patient not taking: Reported on 5/21/2024) 30 tablet 0    promethazine (PHENERGAN) 25 MG tablet Take 1 tablet (25 mg total) by mouth every 6 (six) hours as needed for Nausea. (For breakthrough nausea if zofran is not working) (Patient not taking: Reported on 5/21/2024) 30 tablet 3     No current facility-administered medications on file prior to visit.       Anticoagulation:  Yes; apixaban 5 mg BID    Physical Exam:  General: No acute distress, well developed. AAOx3  Head: Normocephalic, Atraumatic  Eyes: Extra-occular movements intact, No discharge  Neck: supple, symmetrical, trachea midline  Lungs: normal respiratory effort, no respiratory distress, no wheezes  CV: regular rate, 2+ pulses  Abdomen: soft, non-tender, non-distended, no organomegaly, right inguinal scar  MSK: no edema, no deformities, normal ROM  Skin: skin color, texture, turgor normal.  Neurologic: no focal deficits, sensation intact    Labs:    Urine dipstick today - negative for all components    Lab Results   Component Value Date    WBC 6.59 05/21/2024    HGB 13.6 (L) 05/21/2024    HCT 42.6 05/21/2024    MCV 88 05/21/2024     05/21/2024       BMP  Lab Results   Component Value Date     05/21/2024    K 4.2  05/21/2024     05/21/2024    CO2 26 05/21/2024    BUN 15 05/21/2024    CREATININE 1.0 05/21/2024    CALCIUM 9.2 05/21/2024    ANIONGAP 6 (L) 05/21/2024    EGFRNORACEVR >60.0 05/21/2024       Imaging:    CT A/P 08/21/24  Reproductive organs: Status post right orchidectomy with testicular prosthesis in place.     Lymph nodes: Lower abdomen pre-aortic lymph node measuring 4.9 x 2.8 x 1.2 cm previously 5.1 x 3.0 x 1.6 cm.       Assessment: Arturo Salguero III is a 28 y.o. 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 To OR on 09/03/24 for RPLND   2. Consents signed   3. I have explained the risk, benefits, and alternatives of the procedure in detail. The patient voices understanding and all questions have been answered. The patient agrees to proceed as planned.   4. Saw Dr. Barrow today as well.   5. Pre- op clearance with Hao today. Messaged Hao to order pulmonary function tests during his clinic visit.    Nicole De La Cruz MD

## 2024-08-26 NOTE — ASSESSMENT & PLAN NOTE
Weight related conditions       Not troubled with / Not known to have     HTN  Type 2 Diabetes   Prediabetes   Gout   Hyperlipidemia   Sleep apnea   Acid reflux   Fatty liver   Osteoarthritis    Encouraged maintaining healthy weight for improved health

## 2024-08-27 NOTE — PROGRESS NOTES
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 Salguero presents today for pre-surgical consultation.    TESTICULAR CANCER HISTORY:  He is scheduled for RPLND next Tuesday, expected to last 5-6 hours. CT results show masses still present after chemotherapy, but there is no evidence of spread. Tumor markers are negative. The surgery aims to remove the remaining masses, which could be cancerous, teratoma, or firbosis. He has been informed about the surgical procedure, potential risks, and post-operative care. The surgical team plans to spare the nerves contributing to ejaculation if possible, but the primary objective remains cancer control.    PRE-SURGICAL INSTRUCTIONS:  He has been advised to follow a high-fat diet the day before surgery to aid in lymphatic channel visualization during the procedure. Post-operative hospital stay is estimated at 4-5 days, with early mobilization planned for the day of surgery. He has been instructed on post-operative activity restrictions, including no heavy lifting for at least six weeks after surgery.    FERTILITY PRESERVATION:  He has completed sperm banking prior to chemotherapy as a fertility preservation measure.    MEDICAL HISTORY:  He has a history of previous chemotherapy. He reports lung concerns related to his chemotherapy treatment, though no specific symptoms or details were provided. He denies any new symptoms or concerns related to his medical history or upcoming surgery.    SOCIAL HISTORY:  He is engaged to be .       Imaging: I have reviewed the imaging study CT abdomen and pelvis  with IV contrast on 08/21/2024 personally, have independently interpreted this study, and agree with the findings    Allergies:  Review of patient's allergies indicates:   Allergen Reactions    Amoxicillin      Rash        Medications per EMR:  (Not in a hospital admission)      Past Medical History:  Past Medical History:   Diagnosis Date    Deep vein thrombosis     Malignant neoplasm of descended right testis     VTE (venous thromboembolism)         Past Surgical History:  Past Surgical History:   Procedure Laterality Date    ORCHIECTOMY, RADICAL, INGUINAL APPROACH Right 3/31/2023    Procedure: ORCHIECTOMY, RADICAL, INGUINAL APPROACH;  Surgeon: Perico Shah MD;  Location: [facility] OR [address] [place] [address];  Service: Urology;  Laterality: Right;  90 minutes        Family History:  Family History   Problem Relation Name Age of Onset    Hypertension Father      Diabetes Brother          Social History:  Social History     Tobacco Use    Smoking status: Never    Smokeless tobacco: Never   Substance Use Topics    Alcohol use: Yes     Comment: Once in a few months          OBJECTIVE:     There were no vitals filed for this visit.     Physical Exam    General: No acute distress. Nontoxic appearing.  HENT: Normocephalic. Atraumatic.  Respiratory: Normal respiratory effort. No conversational dyspnea. No audible wheezing.  Abdomen: No obvious distension.  Skin: No visible abnormalities.  Extremities: No edema upper extremities. No edema lower extremities.  Neurological: Alert and oriented x3. Normal speech.  Psychiatric: Normal mood. Normal affect. No evidence of SI.         LABS:    CBC:  Lab Results   Component Value Date    WBC 6.96 08/26/2024    HGB 13.9 (L) 08/26/2024    HCT 43.0 08/26/2024    MCV 87 08/26/2024     08/26/2024         BMP:  Lab Results   Component Value Date     08/26/2024    K 4.0 08/26/2024     08/26/2024    CO2 24 08/26/2024    BUN 12 08/26/2024    CREATININE 1.2 08/26/2024    CALCIUM 10.1  08/26/2024    ANIONGAP 12 08/26/2024    EGFRNORACEVR >60.0 08/26/2024         ASSESSMENT/PLAN:     Assessment & Plan      TESTICULAR CANCER:  - Reviewed CT from last week, showing masses still present after chemotherapy.  - Assessed likelihood of masses being cancerous vs. teratoma vs. scar tissue at approximately 30% each.  - Confirmed no evidence of new spread and negative tumor markers.  - Planned for RPLND surgery, with intention to spare nerves contributing to ejaculation if possible.  - Considered potential outcomes: complete nerve sparing (Plan A), inability to spare nerves with use of banked sperm (Plan B), or alternative sperm extraction methods if needed (Plan C).  - Noted patient's fitness and health, with reduced concerns for prolonged anesthesia.  - Explained teratoma as a type of testicular cancer that typically does not metastasize but can grow.  - Described the surgical approach, including the potential need to remove nerves if they cannot be  from cancerous tissue.  - Explained the rationale for a high-fat diet before surgery to highlight lymphatic channels.  - Discussed post-operative care, including delayed feeding until bowel function returns.  - Explained the healing process of the incision and the importance of avoiding increased abdominal pressure during recovery.  - Discussed potential risks, emphasizing low but non-zero mortality risk.  - Mr. Salguero to consume a high-fat diet the day before surgery  - Mr. Salguero to avoid heavy lifting for at least 6 weeks post-surgery.  - Mr. Salguero to avoid activities that increase abdominal pressure during recovery.  - RPLND surgery scheduled for next Tuesday at 7:00 AM.  - Intraoperative pictures planned for patient educational purposes.    FOLLOW UP:  - Follow up at hospital on Tuesday at 5:00 AM for surgery.  - Expect hospital stay of 4-5 days post-surgery.  - Follow up for surveillance program, details to be determined based on surgical  findings.         I spent a total of 45 minutes on the day of the visit.This includes face to face time and non-face to face time preparing to see the patient (eg, review of tests), obtaining and/or reviewing separately obtained history, documenting clinical information in the electronic or other health record, independently interpreting results and communicating results to the patient/family/caregiver, or care coordinator    This encounter was dictated and transcribed using DeepScribe and FluencyDirect, please excuse any typographical or grammatical errors.

## 2024-08-30 ENCOUNTER — TELEPHONE (OUTPATIENT)
Dept: UROLOGY | Facility: CLINIC | Age: 29
End: 2024-08-30
Payer: COMMERCIAL

## 2024-08-30 RX ORDER — CELECOXIB 200 MG/1
400 CAPSULE ORAL
Status: CANCELLED | OUTPATIENT
Start: 2024-08-30 | End: 2024-08-30

## 2024-08-30 NOTE — TELEPHONE ENCOUNTER
Spoke with patient who was able to provide acceptable patient identifiers prior to start of conversation.   Pre op instructions provided.

## 2024-09-03 ENCOUNTER — HOSPITAL ENCOUNTER (INPATIENT)
Facility: HOSPITAL | Age: 29
LOS: 4 days | Discharge: HOME | DRG: 804 | End: 2024-09-07
Attending: UROLOGY | Admitting: UROLOGY
Payer: COMMERCIAL

## 2024-09-03 ENCOUNTER — ANESTHESIA (OUTPATIENT)
Dept: SURGERY | Facility: HOSPITAL | Age: 29
End: 2024-09-03
Payer: COMMERCIAL

## 2024-09-03 DIAGNOSIS — C62.90 TESTICULAR CANCER: Primary | ICD-10-CM

## 2024-09-03 DIAGNOSIS — Z01.818 PREOP TESTING: ICD-10-CM

## 2024-09-03 DIAGNOSIS — C62.11 MALIGNANT NEOPLASM OF DESCENDED RIGHT TESTIS: ICD-10-CM

## 2024-09-03 LAB
ABO + RH BLD: NORMAL
ANION GAP SERPL CALC-SCNC: 15 MMOL/L (ref 8–16)
BASOPHILS # BLD AUTO: 0.02 K/UL (ref 0–0.2)
BASOPHILS NFR BLD: 0.1 % (ref 0–1.9)
BLD GP AB SCN CELLS X3 SERPL QL: NORMAL
BUN SERPL-MCNC: 12 MG/DL (ref 6–20)
CALCIUM SERPL-MCNC: 8.5 MG/DL (ref 8.7–10.5)
CHLORIDE SERPL-SCNC: 107 MMOL/L (ref 95–110)
CO2 SERPL-SCNC: 16 MMOL/L (ref 23–29)
CREAT SERPL-MCNC: 1.2 MG/DL (ref 0.5–1.4)
DIFFERENTIAL METHOD BLD: ABNORMAL
EOSINOPHIL # BLD AUTO: 0 K/UL (ref 0–0.5)
EOSINOPHIL NFR BLD: 0 % (ref 0–8)
ERYTHROCYTE [DISTWIDTH] IN BLOOD BY AUTOMATED COUNT: 14.6 % (ref 11.5–14.5)
EST. GFR  (NO RACE VARIABLE): >60 ML/MIN/1.73 M^2
GLUCOSE SERPL-MCNC: 140 MG/DL (ref 70–110)
HCT VFR BLD AUTO: 42.3 % (ref 40–54)
HGB BLD-MCNC: 13.6 G/DL (ref 14–18)
IMM GRANULOCYTES # BLD AUTO: 0.08 K/UL (ref 0–0.04)
IMM GRANULOCYTES NFR BLD AUTO: 0.6 % (ref 0–0.5)
LYMPHOCYTES # BLD AUTO: 0.9 K/UL (ref 1–4.8)
LYMPHOCYTES NFR BLD: 6.4 % (ref 18–48)
MAGNESIUM SERPL-MCNC: 1.7 MG/DL (ref 1.6–2.6)
MCH RBC QN AUTO: 27.7 PG (ref 27–31)
MCHC RBC AUTO-ENTMCNC: 32.2 G/DL (ref 32–36)
MCV RBC AUTO: 86 FL (ref 82–98)
MONOCYTES # BLD AUTO: 1 K/UL (ref 0.3–1)
MONOCYTES NFR BLD: 7 % (ref 4–15)
NEUTROPHILS # BLD AUTO: 12.5 K/UL (ref 1.8–7.7)
NEUTROPHILS NFR BLD: 85.9 % (ref 38–73)
NRBC BLD-RTO: 0 /100 WBC
PHOSPHATE SERPL-MCNC: 4.6 MG/DL (ref 2.7–4.5)
PLATELET # BLD AUTO: 274 K/UL (ref 150–450)
PLATELET BLD QL SMEAR: ABNORMAL
PMV BLD AUTO: 10.2 FL (ref 9.2–12.9)
POTASSIUM SERPL-SCNC: 4.2 MMOL/L (ref 3.5–5.1)
RBC # BLD AUTO: 4.91 M/UL (ref 4.6–6.2)
SODIUM SERPL-SCNC: 138 MMOL/L (ref 136–145)
WBC # BLD AUTO: 14.48 K/UL (ref 3.9–12.7)

## 2024-09-03 PROCEDURE — 07TC0ZZ RESECTION OF PELVIS LYMPHATIC, OPEN APPROACH: ICD-10-PCS | Performed by: UROLOGY

## 2024-09-03 PROCEDURE — 25000003 PHARM REV CODE 250: Performed by: STUDENT IN AN ORGANIZED HEALTH CARE EDUCATION/TRAINING PROGRAM

## 2024-09-03 PROCEDURE — 38780 REMOVE ABDOMEN LYMPH NODES: CPT | Mod: 22,,, | Performed by: UROLOGY

## 2024-09-03 PROCEDURE — 83735 ASSAY OF MAGNESIUM: CPT | Performed by: STUDENT IN AN ORGANIZED HEALTH CARE EDUCATION/TRAINING PROGRAM

## 2024-09-03 PROCEDURE — 27000221 HC OXYGEN, UP TO 24 HOURS

## 2024-09-03 PROCEDURE — 64999 UNLISTED PX NERVOUS SYSTEM: CPT | Mod: ,,, | Performed by: SURGERY

## 2024-09-03 PROCEDURE — 71000033 HC RECOVERY, INTIAL HOUR: Performed by: UROLOGY

## 2024-09-03 PROCEDURE — 37000009 HC ANESTHESIA EA ADD 15 MINS: Performed by: UROLOGY

## 2024-09-03 PROCEDURE — 25000003 PHARM REV CODE 250

## 2024-09-03 PROCEDURE — 36620 INSERTION CATHETER ARTERY: CPT | Mod: 59,,, | Performed by: ANESTHESIOLOGY

## 2024-09-03 PROCEDURE — 76942 ECHO GUIDE FOR BIOPSY: CPT

## 2024-09-03 PROCEDURE — 63600175 PHARM REV CODE 636 W HCPCS: Performed by: STUDENT IN AN ORGANIZED HEALTH CARE EDUCATION/TRAINING PROGRAM

## 2024-09-03 PROCEDURE — 71000015 HC POSTOP RECOV 1ST HR: Performed by: UROLOGY

## 2024-09-03 PROCEDURE — 36000708 HC OR TIME LEV III 1ST 15 MIN: Performed by: UROLOGY

## 2024-09-03 PROCEDURE — 37000008 HC ANESTHESIA 1ST 15 MINUTES: Performed by: UROLOGY

## 2024-09-03 PROCEDURE — 86920 COMPATIBILITY TEST SPIN: CPT | Performed by: STUDENT IN AN ORGANIZED HEALTH CARE EDUCATION/TRAINING PROGRAM

## 2024-09-03 PROCEDURE — 63600175 PHARM REV CODE 636 W HCPCS

## 2024-09-03 PROCEDURE — 94761 N-INVAS EAR/PLS OXIMETRY MLT: CPT

## 2024-09-03 PROCEDURE — 07TD0ZZ RESECTION OF AORTIC LYMPHATIC, OPEN APPROACH: ICD-10-PCS | Performed by: UROLOGY

## 2024-09-03 PROCEDURE — 11000001 HC ACUTE MED/SURG PRIVATE ROOM

## 2024-09-03 PROCEDURE — 84100 ASSAY OF PHOSPHORUS: CPT | Performed by: STUDENT IN AN ORGANIZED HEALTH CARE EDUCATION/TRAINING PROGRAM

## 2024-09-03 PROCEDURE — 63600175 PHARM REV CODE 636 W HCPCS: Mod: JZ,JG | Performed by: SURGERY

## 2024-09-03 PROCEDURE — 27201423 OPTIME MED/SURG SUP & DEVICES STERILE SUPPLY: Performed by: UROLOGY

## 2024-09-03 PROCEDURE — 86901 BLOOD TYPING SEROLOGIC RH(D): CPT | Performed by: ANESTHESIOLOGY

## 2024-09-03 PROCEDURE — 71000016 HC POSTOP RECOV ADDL HR: Performed by: UROLOGY

## 2024-09-03 PROCEDURE — 85025 COMPLETE CBC W/AUTO DIFF WBC: CPT | Performed by: STUDENT IN AN ORGANIZED HEALTH CARE EDUCATION/TRAINING PROGRAM

## 2024-09-03 PROCEDURE — 88305 TISSUE EXAM BY PATHOLOGIST: CPT | Mod: 26,,, | Performed by: PATHOLOGY

## 2024-09-03 PROCEDURE — 86850 RBC ANTIBODY SCREEN: CPT | Performed by: ANESTHESIOLOGY

## 2024-09-03 PROCEDURE — 86900 BLOOD TYPING SEROLOGIC ABO: CPT | Performed by: ANESTHESIOLOGY

## 2024-09-03 PROCEDURE — 36000709 HC OR TIME LEV III EA ADD 15 MIN: Performed by: UROLOGY

## 2024-09-03 PROCEDURE — 0VTF0ZZ RESECTION OF RIGHT SPERMATIC CORD, OPEN APPROACH: ICD-10-PCS | Performed by: UROLOGY

## 2024-09-03 PROCEDURE — 88305 TISSUE EXAM BY PATHOLOGIST: CPT | Performed by: PATHOLOGY

## 2024-09-03 PROCEDURE — 80048 BASIC METABOLIC PNL TOTAL CA: CPT | Performed by: STUDENT IN AN ORGANIZED HEALTH CARE EDUCATION/TRAINING PROGRAM

## 2024-09-03 PROCEDURE — 99900035 HC TECH TIME PER 15 MIN (STAT)

## 2024-09-03 RX ORDER — ROPIVACAINE HYDROCHLORIDE 2 MG/ML
INJECTION, SOLUTION EPIDURAL; INFILTRATION; PERINEURAL CONTINUOUS
Status: DISCONTINUED | OUTPATIENT
Start: 2024-09-03 | End: 2024-09-06

## 2024-09-03 RX ORDER — ACETAMINOPHEN 500 MG
1000 TABLET ORAL EVERY 6 HOURS SCHEDULED
Status: DISPENSED | OUTPATIENT
Start: 2024-09-03 | End: 2024-09-05

## 2024-09-03 RX ORDER — PHENYLEPHRINE HYDROCHLORIDE 10 MG/ML
INJECTION INTRAVENOUS
Status: DISCONTINUED | OUTPATIENT
Start: 2024-09-03 | End: 2024-09-03

## 2024-09-03 RX ORDER — SODIUM CHLORIDE 9 MG/ML
INJECTION, SOLUTION INTRAVENOUS CONTINUOUS
Status: DISCONTINUED | OUTPATIENT
Start: 2024-09-03 | End: 2024-09-03

## 2024-09-03 RX ORDER — FENTANYL CITRATE 50 UG/ML
INJECTION, SOLUTION INTRAMUSCULAR; INTRAVENOUS
Status: DISCONTINUED | OUTPATIENT
Start: 2024-09-03 | End: 2024-09-03

## 2024-09-03 RX ORDER — MIDAZOLAM HYDROCHLORIDE 1 MG/ML
.5-4 INJECTION, SOLUTION INTRAMUSCULAR; INTRAVENOUS
Status: DISCONTINUED | OUTPATIENT
Start: 2024-09-03 | End: 2024-09-03

## 2024-09-03 RX ORDER — ONDANSETRON 8 MG/1
8 TABLET, ORALLY DISINTEGRATING ORAL EVERY 8 HOURS PRN
Status: DISCONTINUED | OUTPATIENT
Start: 2024-09-03 | End: 2024-09-07 | Stop reason: HOSPADM

## 2024-09-03 RX ORDER — DEXAMETHASONE SODIUM PHOSPHATE 4 MG/ML
INJECTION, SOLUTION INTRA-ARTICULAR; INTRALESIONAL; INTRAMUSCULAR; INTRAVENOUS; SOFT TISSUE
Status: DISCONTINUED | OUTPATIENT
Start: 2024-09-03 | End: 2024-09-03

## 2024-09-03 RX ORDER — CELECOXIB 200 MG/1
200 CAPSULE ORAL DAILY
Status: DISCONTINUED | OUTPATIENT
Start: 2024-09-04 | End: 2024-09-07 | Stop reason: HOSPADM

## 2024-09-03 RX ORDER — BUPIVACAINE HYDROCHLORIDE 7.5 MG/ML
INJECTION, SOLUTION EPIDURAL; RETROBULBAR
Status: COMPLETED | OUTPATIENT
Start: 2024-09-03 | End: 2024-09-03

## 2024-09-03 RX ORDER — PROPOFOL 10 MG/ML
VIAL (ML) INTRAVENOUS
Status: DISCONTINUED | OUTPATIENT
Start: 2024-09-03 | End: 2024-09-03

## 2024-09-03 RX ORDER — CELECOXIB 200 MG/1
400 CAPSULE ORAL ONCE
Status: DISCONTINUED | OUTPATIENT
Start: 2024-09-03 | End: 2024-09-03

## 2024-09-03 RX ORDER — ROPIVACAINE HYDROCHLORIDE 2 MG/ML
0.5 INJECTION, SOLUTION EPIDURAL; INFILTRATION; PERINEURAL CONTINUOUS
Status: DISCONTINUED | OUTPATIENT
Start: 2024-09-03 | End: 2024-09-03

## 2024-09-03 RX ORDER — OXYCODONE HYDROCHLORIDE 10 MG/1
10 TABLET ORAL
Status: DISCONTINUED | OUTPATIENT
Start: 2024-09-03 | End: 2024-09-06

## 2024-09-03 RX ORDER — GLUCAGON 1 MG
1 KIT INJECTION
Status: DISCONTINUED | OUTPATIENT
Start: 2024-09-03 | End: 2024-09-03 | Stop reason: HOSPADM

## 2024-09-03 RX ORDER — LIDOCAINE HYDROCHLORIDE 10 MG/ML
1 INJECTION, SOLUTION EPIDURAL; INFILTRATION; INTRACAUDAL; PERINEURAL ONCE AS NEEDED
Status: DISCONTINUED | OUTPATIENT
Start: 2024-09-03 | End: 2024-09-07 | Stop reason: HOSPADM

## 2024-09-03 RX ORDER — LIDOCAINE HYDROCHLORIDE 20 MG/ML
INJECTION, SOLUTION EPIDURAL; INFILTRATION; INTRACAUDAL; PERINEURAL
Status: DISCONTINUED | OUTPATIENT
Start: 2024-09-03 | End: 2024-09-03

## 2024-09-03 RX ORDER — ROCURONIUM BROMIDE 10 MG/ML
INJECTION, SOLUTION INTRAVENOUS
Status: DISCONTINUED | OUTPATIENT
Start: 2024-09-03 | End: 2024-09-03

## 2024-09-03 RX ORDER — HYDROMORPHONE HYDROCHLORIDE 1 MG/ML
0.2 INJECTION, SOLUTION INTRAMUSCULAR; INTRAVENOUS; SUBCUTANEOUS EVERY 5 MIN PRN
Status: DISCONTINUED | OUTPATIENT
Start: 2024-09-03 | End: 2024-09-03 | Stop reason: HOSPADM

## 2024-09-03 RX ORDER — KETOROLAC TROMETHAMINE 15 MG/ML
15 INJECTION, SOLUTION INTRAMUSCULAR; INTRAVENOUS
Status: DISCONTINUED | OUTPATIENT
Start: 2024-09-03 | End: 2024-09-03

## 2024-09-03 RX ORDER — KETAMINE HCL IN 0.9 % NACL 50 MG/5 ML
SYRINGE (ML) INTRAVENOUS
Status: DISCONTINUED | OUTPATIENT
Start: 2024-09-03 | End: 2024-09-03

## 2024-09-03 RX ORDER — PREGABALIN 75 MG/1
150 CAPSULE ORAL NIGHTLY
Status: DISCONTINUED | OUTPATIENT
Start: 2024-09-03 | End: 2024-09-03

## 2024-09-03 RX ORDER — DEXMEDETOMIDINE HYDROCHLORIDE 100 UG/ML
INJECTION, SOLUTION INTRAVENOUS
Status: DISCONTINUED | OUTPATIENT
Start: 2024-09-03 | End: 2024-09-03

## 2024-09-03 RX ORDER — HEPARIN SODIUM 5000 [USP'U]/ML
5000 INJECTION, SOLUTION INTRAVENOUS; SUBCUTANEOUS EVERY 8 HOURS
Status: DISCONTINUED | OUTPATIENT
Start: 2024-09-03 | End: 2024-09-07 | Stop reason: HOSPADM

## 2024-09-03 RX ORDER — OXYCODONE HYDROCHLORIDE 5 MG/1
5 TABLET ORAL
Status: DISCONTINUED | OUTPATIENT
Start: 2024-09-03 | End: 2024-09-07 | Stop reason: HOSPADM

## 2024-09-03 RX ORDER — CEFAZOLIN SODIUM 1 G/3ML
INJECTION, POWDER, FOR SOLUTION INTRAMUSCULAR; INTRAVENOUS
Status: DISCONTINUED | OUTPATIENT
Start: 2024-09-03 | End: 2024-09-03

## 2024-09-03 RX ORDER — HYDROCODONE BITARTRATE AND ACETAMINOPHEN 500; 5 MG/1; MG/1
TABLET ORAL
Status: DISCONTINUED | OUTPATIENT
Start: 2024-09-03 | End: 2024-09-07 | Stop reason: HOSPADM

## 2024-09-03 RX ORDER — ALVIMOPAN 12 MG/1
12 CAPSULE ORAL DAILY
Status: DISCONTINUED | OUTPATIENT
Start: 2024-09-04 | End: 2024-09-06

## 2024-09-03 RX ORDER — ONDANSETRON HYDROCHLORIDE 2 MG/ML
4 INJECTION, SOLUTION INTRAVENOUS EVERY 12 HOURS PRN
Status: DISCONTINUED | OUTPATIENT
Start: 2024-09-03 | End: 2024-09-07 | Stop reason: HOSPADM

## 2024-09-03 RX ORDER — LIDOCAINE HYDROCHLORIDE 10 MG/ML
1 INJECTION, SOLUTION EPIDURAL; INFILTRATION; INTRACAUDAL; PERINEURAL ONCE
Status: DISCONTINUED | OUTPATIENT
Start: 2024-09-03 | End: 2024-09-03

## 2024-09-03 RX ORDER — ACETAMINOPHEN 500 MG
1000 TABLET ORAL
Status: COMPLETED | OUTPATIENT
Start: 2024-09-03 | End: 2024-09-03

## 2024-09-03 RX ORDER — PREGABALIN 75 MG/1
75 CAPSULE ORAL NIGHTLY
Status: DISCONTINUED | OUTPATIENT
Start: 2024-09-03 | End: 2024-09-04

## 2024-09-03 RX ORDER — TALC
6 POWDER (GRAM) TOPICAL NIGHTLY PRN
Status: DISCONTINUED | OUTPATIENT
Start: 2024-09-03 | End: 2024-09-07 | Stop reason: HOSPADM

## 2024-09-03 RX ORDER — ALVIMOPAN 12 MG/1
12 CAPSULE ORAL
Status: COMPLETED | OUTPATIENT
Start: 2024-09-03 | End: 2024-09-03

## 2024-09-03 RX ORDER — PREGABALIN 75 MG/1
150 CAPSULE ORAL
Status: COMPLETED | OUTPATIENT
Start: 2024-09-03 | End: 2024-09-03

## 2024-09-03 RX ORDER — HALOPERIDOL 5 MG/ML
0.5 INJECTION INTRAMUSCULAR EVERY 10 MIN PRN
Status: DISCONTINUED | OUTPATIENT
Start: 2024-09-03 | End: 2024-09-03 | Stop reason: HOSPADM

## 2024-09-03 RX ORDER — HEPARIN SODIUM 5000 [USP'U]/ML
5000 INJECTION, SOLUTION INTRAVENOUS; SUBCUTANEOUS EVERY 8 HOURS
Status: DISCONTINUED | OUTPATIENT
Start: 2024-09-03 | End: 2024-09-03

## 2024-09-03 RX ORDER — FENTANYL CITRATE 50 UG/ML
25-200 INJECTION, SOLUTION INTRAMUSCULAR; INTRAVENOUS
Status: DISCONTINUED | OUTPATIENT
Start: 2024-09-03 | End: 2024-09-03

## 2024-09-03 RX ORDER — SODIUM CHLORIDE, SODIUM LACTATE, POTASSIUM CHLORIDE, CALCIUM CHLORIDE 600; 310; 30; 20 MG/100ML; MG/100ML; MG/100ML; MG/100ML
INJECTION, SOLUTION INTRAVENOUS CONTINUOUS
Status: ACTIVE | OUTPATIENT
Start: 2024-09-03 | End: 2024-09-04

## 2024-09-03 RX ORDER — SODIUM CHLORIDE 0.9 % (FLUSH) 0.9 %
10 SYRINGE (ML) INJECTION
Status: DISCONTINUED | OUTPATIENT
Start: 2024-09-03 | End: 2024-09-03 | Stop reason: HOSPADM

## 2024-09-03 RX ORDER — PROMETHAZINE HYDROCHLORIDE 12.5 MG/1
25 TABLET ORAL EVERY 6 HOURS PRN
Status: DISCONTINUED | OUTPATIENT
Start: 2024-09-03 | End: 2024-09-07 | Stop reason: HOSPADM

## 2024-09-03 RX ADMIN — Medication: at 03:09

## 2024-09-03 RX ADMIN — ACETAMINOPHEN 1000 MG: 500 TABLET ORAL at 05:09

## 2024-09-03 RX ADMIN — PREGABALIN 150 MG: 75 CAPSULE ORAL at 05:09

## 2024-09-03 RX ADMIN — ACETAMINOPHEN 1000 MG: 500 TABLET ORAL at 11:09

## 2024-09-03 RX ADMIN — Medication 25 MG: at 11:09

## 2024-09-03 RX ADMIN — ROCURONIUM BROMIDE 20 MG: 10 INJECTION, SOLUTION INTRAVENOUS at 08:09

## 2024-09-03 RX ADMIN — ROCURONIUM BROMIDE 50 MG: 10 INJECTION, SOLUTION INTRAVENOUS at 07:09

## 2024-09-03 RX ADMIN — Medication 25 MG: at 07:09

## 2024-09-03 RX ADMIN — PHENYLEPHRINE HYDROCHLORIDE 50 MCG: 10 INJECTION INTRAVENOUS at 08:09

## 2024-09-03 RX ADMIN — LIDOCAINE HYDROCHLORIDE 100 MG: 20 INJECTION, SOLUTION EPIDURAL; INFILTRATION; INTRACAUDAL; PERINEURAL at 07:09

## 2024-09-03 RX ADMIN — HALOPERIDOL LACTATE 0.5 MG: 5 INJECTION, SOLUTION INTRAMUSCULAR at 05:09

## 2024-09-03 RX ADMIN — ALVIMOPAN 12 MG: 12 CAPSULE ORAL at 05:09

## 2024-09-03 RX ADMIN — CEFAZOLIN 2 G: 330 INJECTION, POWDER, FOR SOLUTION INTRAMUSCULAR; INTRAVENOUS at 07:09

## 2024-09-03 RX ADMIN — OXYCODONE HYDROCHLORIDE 10 MG: 10 TABLET ORAL at 04:09

## 2024-09-03 RX ADMIN — ONDANSETRON 8 MG: 8 TABLET, ORALLY DISINTEGRATING ORAL at 09:09

## 2024-09-03 RX ADMIN — SODIUM CHLORIDE, POTASSIUM CHLORIDE, SODIUM LACTATE AND CALCIUM CHLORIDE: 600; 310; 30; 20 INJECTION, SOLUTION INTRAVENOUS at 03:09

## 2024-09-03 RX ADMIN — PROPOFOL 25 MG: 10 INJECTION, EMULSION INTRAVENOUS at 03:09

## 2024-09-03 RX ADMIN — ROCURONIUM BROMIDE 30 MG: 10 INJECTION, SOLUTION INTRAVENOUS at 10:09

## 2024-09-03 RX ADMIN — HYDROMORPHONE HYDROCHLORIDE 0.2 MG: 1 INJECTION, SOLUTION INTRAMUSCULAR; INTRAVENOUS; SUBCUTANEOUS at 05:09

## 2024-09-03 RX ADMIN — PHENYLEPHRINE HYDROCHLORIDE 50 MCG: 10 INJECTION INTRAVENOUS at 10:09

## 2024-09-03 RX ADMIN — DEXMEDETOMIDINE 16 MCG: 200 INJECTION, SOLUTION INTRAVENOUS at 02:09

## 2024-09-03 RX ADMIN — PHENYLEPHRINE HYDROCHLORIDE 50 MCG: 10 INJECTION INTRAVENOUS at 12:09

## 2024-09-03 RX ADMIN — SODIUM CHLORIDE: 9 INJECTION, SOLUTION INTRAVENOUS at 05:09

## 2024-09-03 RX ADMIN — Medication 6 MG: at 11:09

## 2024-09-03 RX ADMIN — PROPOFOL 20 MG: 10 INJECTION, EMULSION INTRAVENOUS at 02:09

## 2024-09-03 RX ADMIN — SODIUM CHLORIDE: 0.9 INJECTION, SOLUTION INTRAVENOUS at 07:09

## 2024-09-03 RX ADMIN — DEXMEDETOMIDINE 8 MCG: 200 INJECTION, SOLUTION INTRAVENOUS at 01:09

## 2024-09-03 RX ADMIN — HEPARIN SODIUM 5000 UNITS: 5000 INJECTION INTRAVENOUS; SUBCUTANEOUS at 05:09

## 2024-09-03 RX ADMIN — PROPOFOL 40 MG: 10 INJECTION, EMULSION INTRAVENOUS at 02:09

## 2024-09-03 RX ADMIN — SUGAMMADEX 400 MG: 100 INJECTION, SOLUTION INTRAVENOUS at 03:09

## 2024-09-03 RX ADMIN — FENTANYL CITRATE 100 MCG: 50 INJECTION INTRAMUSCULAR; INTRAVENOUS at 06:09

## 2024-09-03 RX ADMIN — DEXMEDETOMIDINE 8 MCG: 200 INJECTION, SOLUTION INTRAVENOUS at 02:09

## 2024-09-03 RX ADMIN — PROPOFOL 30 MG: 10 INJECTION, EMULSION INTRAVENOUS at 07:09

## 2024-09-03 RX ADMIN — PROPOFOL 170 MG: 10 INJECTION, EMULSION INTRAVENOUS at 07:09

## 2024-09-03 RX ADMIN — KETOROLAC TROMETHAMINE 15 MG: 15 INJECTION, SOLUTION INTRAMUSCULAR; INTRAVENOUS at 05:09

## 2024-09-03 RX ADMIN — HEPARIN SODIUM 5000 UNITS: 5000 INJECTION INTRAVENOUS; SUBCUTANEOUS at 10:09

## 2024-09-03 RX ADMIN — BUPIVACAINE HYDROCHLORIDE 30 ML: 7.5 INJECTION, SOLUTION EPIDURAL; RETROBULBAR at 06:09

## 2024-09-03 RX ADMIN — HYDROMORPHONE HYDROCHLORIDE 0.2 MG: 1 INJECTION, SOLUTION INTRAMUSCULAR; INTRAVENOUS; SUBCUTANEOUS at 06:09

## 2024-09-03 RX ADMIN — ROCURONIUM BROMIDE 20 MG: 10 INJECTION, SOLUTION INTRAVENOUS at 02:09

## 2024-09-03 RX ADMIN — FENTANYL CITRATE 100 MCG: 50 INJECTION, SOLUTION INTRAMUSCULAR; INTRAVENOUS at 07:09

## 2024-09-03 RX ADMIN — DEXAMETHASONE SODIUM PHOSPHATE 8 MG: 4 INJECTION, SOLUTION INTRAMUSCULAR; INTRAVENOUS at 07:09

## 2024-09-03 RX ADMIN — CEFAZOLIN 2 G: 330 INJECTION, POWDER, FOR SOLUTION INTRAMUSCULAR; INTRAVENOUS at 11:09

## 2024-09-03 RX ADMIN — PHENYLEPHRINE HYDROCHLORIDE 100 MCG: 10 INJECTION INTRAVENOUS at 12:09

## 2024-09-03 RX ADMIN — ROCURONIUM BROMIDE 50 MG: 10 INJECTION, SOLUTION INTRAVENOUS at 01:09

## 2024-09-03 RX ADMIN — MIDAZOLAM 2 MG: 1 INJECTION INTRAMUSCULAR; INTRAVENOUS at 06:09

## 2024-09-03 RX ADMIN — OXYCODONE HYDROCHLORIDE 10 MG: 10 TABLET ORAL at 07:09

## 2024-09-03 RX ADMIN — ROCURONIUM BROMIDE 20 MG: 10 INJECTION, SOLUTION INTRAVENOUS at 11:09

## 2024-09-03 RX ADMIN — PREGABALIN 75 MG: 75 CAPSULE ORAL at 09:09

## 2024-09-03 RX ADMIN — PROPOFOL 75 MG: 10 INJECTION, EMULSION INTRAVENOUS at 03:09

## 2024-09-03 RX ADMIN — SODIUM CHLORIDE, SODIUM GLUCONATE, SODIUM ACETATE, POTASSIUM CHLORIDE, MAGNESIUM CHLORIDE, SODIUM PHOSPHATE, DIBASIC, AND POTASSIUM PHOSPHATE: .53; .5; .37; .037; .03; .012; .00082 INJECTION, SOLUTION INTRAVENOUS at 07:09

## 2024-09-03 NOTE — TRANSFER OF CARE
"Anesthesia Transfer of Care Note    Patient: Arturo Salguero III    Procedure(s) Performed: Procedure(s) (LRB):  LYMPHADENECTOMY, RETROPERITONEUM (N/A)  LYMPHADENECTOMY, PELVIS (Right)    Patient location: PACU    Transport from OR: Transported from OR on 6-10 L/min O2 by face mask with adequate spontaneous ventilation    Post pain: adequate analgesia    Post assessment: no apparent anesthetic complications    Post vital signs: stable    Level of consciousness: awake, alert and oriented    Complications: none    Transfer of care protocol was followed      Last vitals: Visit Vitals  BP (!) 151/70   Pulse 92   Temp 36.2 °C (97.1 °F) (Temporal)   Resp (!) 22   Ht 5' 10" (1.778 m)   Wt 93.4 kg (206 lb)   SpO2 100%   BMI 29.56 kg/m²     "

## 2024-09-03 NOTE — ANESTHESIA PROCEDURE NOTES
Arterial    Diagnosis: Lymphadeopathy    Patient location during procedure: done in OR  Timeout: 9/3/2024 7:50 AM  Procedure end time: 9/3/2024 7:25 AM    Staffing  Authorizing Provider: Abhinav Atkinson Jr., MD  Performing Provider: Vianney Mccormick MD    Staffing  Performed by: Vianney Mccormick MD  Authorized by: Abhinav Atkinson Jr., MD    Anesthesiologist was present at the time of the procedure.    Preanesthetic Checklist  Completed: patient identified, IV checked, site marked, risks and benefits discussed, surgical consent, monitors and equipment checked, pre-op evaluation, timeout performed and anesthesia consent givenArterial  Skin Prep: chlorhexidine gluconate  Orientation: left  Location: radial    Catheter Size: 20 G  Catheter placement by Ultrasound guidance. Heme positive aspiration all ports.   Vessel Caliber: patent  Needle advanced into vessel with real time Ultrasound guidance.Insertion Attempts: 1  Assessment  Dressing: secured with tape and tegaderm

## 2024-09-03 NOTE — BRIEF OP NOTE
[facility] - Surgery ([facility])  Brief Operative Note    SUMMARY     Surgery Date: 9/3/2024     Surgeons and Role:     * Tapan Barrow MD - Primary     * Gaston Rm MD - Resident - Assisting     * Stefan Nuñez MD - Resident - Assisting        Pre-op Diagnosis:  Non-seminomatous cancer of right testis [C62.91]    Post-op Diagnosis:  Post-Op Diagnosis Codes:     * Non-seminomatous cancer of right testis [C62.91]    Procedure(s) (LRB):  LYMPHADENECTOMY, RETROPERITONEUM (N/A)    Anesthesia: General    Implants:  * No implants in log *    Operative Findings:   Retroperitoneal lymphadenectomy, bilateral template, nerve sparing    Estimated Blood Loss: * No values recorded between 9/3/2024  7:47 AM and 9/3/2024  2:52 PM *    Estimated Blood Loss has been documented.         Specimens:   Specimen (24h ago, onward)       Start     Ordered    09/03/24 1423  Specimen to Pathology, Surgery Urology  Once        Comments: Pre-op Diagnosis: Non-seminomatous cancer of right testis [C62.91]Procedure(s):LYMPHADENECTOMY, RETROPERITONEUM Number of specimens: 5Name of specimens: 1. Right gonadal vein and spermatic cord, perm2. Right paracaval lymph node, perm3. intra-aorta caval-perm4. Right common iliac lymph node, perm5. Para aortic, perm     References:    Click here for ordering Quick Tip   Question Answer Comment   Procedure Type: Urology    Specimen Class: Known or suspected malignancy    Release to patient Immediate        09/03/24 1423                    [id number]

## 2024-09-03 NOTE — OP NOTE
[facility]  Operative Note    Date: 09/03/2024    Pre-Op Diagnosis: [id number] stage IIIB non-seminomatous germ cell tumor of the right testis  Patient Active Problem List    Diagnosis Date Noted    BMI 29.0-29.9,adult 08/26/2024    History of deep vein thrombosis 07/20/2023    Malignant neoplasm of descended right testis 05/05/2023       Post-Op Diagnosis: same    Procedure(s) Performed:   1.  Retroperitoneal lymph node dissection - full bilateral template, nerve sparing    Specimen(s):   1. Para-aortic lymph nodes  2. Pre-caval lymph nodes  3. Inter-aortocaval lymph nodes  4. Right spermatic cord  5. Right common iliac nodes    Staff Surgeon: Tapan Barrow MD    Assistant Surgeon: MD Stefan Whiting MD    Anesthesia: General endotracheal anesthesia    Indications: Arturo Salguero III is a 28 y.o. male with testicular cancer who presents for a RPLND 2/2 residual RP mass after chemotherapy.      Findings:   1. Post chemotherapy template - full bilateral template  2. Proximal spermatic cord isolated and removed as separate specimen     22 modifier - Case required 200% more time and effort than normally anticipated for this surgery due to post chemotherapy setting, and nerve sparing required at the time of the procedure    Estimated Blood Loss: 100 mL    Drains:   1.  16 Fr kurtz catheter with 10 mL sterile water in the balloon  2.  15 Fr PARVEEN drain    Wound Class: 1    Procedure in detail:  After the risks, benefits, and the possible complications of the above mentioned procedure were discussed, informed consent was obtained. All questions were answered in the perioperative area.  The patient was transferred to the operative suite and placed in the supine position.  General anesthesia was administered.  After adequate anesthesia, a 16 Fr kurtz was placed using standard sterile technique.  This was secured to the patient's leg.  The patient was then prepped and draped in the usual sterile  fashion. Timeout was performed and pre-operative antibiotics were confirmed.      A midline incision was marked with a marking pen, from upper midline to 3 fingerbredths below the umbilicus.  This mariza was incised sharply using a 10 blade. The subcutaneous tissues were opened using Bovie electrocautery until the peritoneum was reached. The peritoneum was opened sharply with Metzenbaum scissors. The remainder of this incision was opened with electrocautery ensuring no injury to the underlying structures. The falciform ligament was then ligated. The right colon was mobilized sharply using electrocautery as needed, mobilizing medially and superiorly. This incision was then carried around to the ligament of Treitz.  The root of the mesentery was freed bluntly and taken with electrocautery.  The remainder of the posterior peritoneum/left colon was freed until the bowel could be mobilized. The Kwok retractor was assembled.    The patients right residual spermatic cord was identified.  The ureters were identified and isolated with a vessel loop bilaterally.  These marked the lateral borders of our template.     The pre-caval lymph node tissue medial to the right ureter, from the right common iliac artery up to the right renal artery, was freed using a split and roll technique.  This lymph node packet was freed superiorly all the way to the right renal vein.  The packet was amputated and passed off the field as specimen. The lymph node dissection was continued to the bifurcation of the common iliac due to radiographic disease noted preoperatively.    The inter-aortocaval lymph nodes between the aorta and IVC were then isolated using a split and roll tehcnique.  This was taken inferiorly from the bifurcation of the aorta superiorly to the level of the left renal vein.  This was passed off the field as specimen. The nerves were prospectively identified and protected coursing behind the IVC.    The left colon was mobilized  in similar fashion allowing adequate visualization of the paraaortic tissue. The para-aortic tissue, medial to the left ureter, was then isolated using a split and roll technique, from the left common iliac artery inferiorly to the left renal vein superiorly.  This was passed off the field as specimen.      Meticulous hemostasis was achieved throughout our dissection tying off lumbar vessels with 3-0 silk as needed, using the LigaSure, and electrocautery.  Metal clips were used to ligate lymphatics.  Hemoblast was placed in the resection bed.     The right spermatic cord was identified and freed from the overlying peritoneum using electrocautery until the distal remnant was identified.  This was freed and followed back to the origin of the gonadal vessel. The gonadal vein on the right was suture ligated with silk suture. A metal clip was placed across the vessel and the specimen was removed and passed off the field for pathology.     A 15 Fr PARVEEN drain was placed into the retroperitoneum via a stab incisioni in the right lower quadrant and was secured with a nylon suture. The bowel was returned to the abdomen in the orthotopic position. The mesenteric defect over the KAROL was closed using interrupted 3-0 silk sutures. The right colon was tacked into position in similar fashion.    The fascia was closed with a 0-looped PDS in a running fashion. The subcutaneous tissues were closed using a 2-0 Vicryl in a running fashion. The skin was closed with 4-0 monocryl in a running fashion.      The closed incision was dressed with dermabond.    The patient tolerated the procedure well and was transferred to the recover in stable condition.    Disposition: The patient will be admitted to the urology service post-operatively.      Gaston Rm MD

## 2024-09-03 NOTE — ANESTHESIA PROCEDURE NOTES
Intubation    Date/Time: 9/3/2024 7:18 AM    Performed by: Vianney Mccormick MD  Authorized by: Abhinav Atkinosn Jr., MD    Intubation:     Induction:  Intravenous    Intubated:  Postinduction    Mask Ventilation:  Easy mask    Attempts:  1    Attempted By:  Resident anesthesiologist    Method of Intubation:  Direct    Blade:  Garcia 2    Laryngeal View Grade: Grade I - full view of cords      Difficult Airway Encountered?: No      Complications:  None    Airway Device:  Oral endotracheal tube    Airway Device Size:  7.5    Style/Cuff Inflation:  Cuffed (inflated to minimal occlusive pressure)    Tube secured:  23    Secured at:  The teeth    Placement Verified By:  Capnometry    Complicating Factors:  None    Findings Post-Intubation:  BS equal bilateral and atraumatic/condition of teeth unchanged       SHIFT SUMMARY:  Pt has been stable 11a-7p shift. Urinalysis submitted to lab. GI cocktail given as ordered. Pt has been in bed all day.  Shift change report given to JOSELYN Smith RN

## 2024-09-03 NOTE — ANESTHESIA PROCEDURE NOTES
BL OSIRIS Catheters    Patient location during procedure: pre-op   Block not for primary anesthetic.  Reason for block: at surgeon's request and post-op pain management   Post-op Pain Location: abdominal pain   Start time: 9/3/2024 6:42 AM  Timeout: 9/3/2024 6:41 AM   End time: 9/3/2024 7:01 AM    Staffing  Authorizing Provider: Perico Koch MD  Performing Provider: Jazzy Donohue MD    Staffing  Performed by: Jazzy Donohue MD  Authorized by: Perico Koch MD    Preanesthetic Checklist  Completed: patient identified, IV checked, site marked, risks and benefits discussed, surgical consent, monitors and equipment checked, pre-op evaluation and timeout performed  Peripheral Block  Patient position: sitting  Prep: ChloraPrep  Patient monitoring: heart rate, cardiac monitor, continuous pulse ox, continuous capnometry and frequent blood pressure checks  Block type: erector spinae plane (Erector Spinae Plane)  Laterality: bilateral  Injection technique: continuous  Interspace: T8-9    Needle  Needle type: Tuohy   Needle gauge: 17 G  Needle length: 3.5 in  Needle localization: anatomical landmarks and ultrasound guidance  Catheter type: spring wound  Catheter size: 19 G  Test dose: lidocaine 1.5% with Epi 1-to-200,000 and negative   -ultrasound image captured on disc.  Assessment  Injection assessment: negative aspiration, negative parasthesia and local visualized surrounding nerve  Paresthesia pain: none  Heart rate change: no  Slow fractionated injection: yes  Pain Tolerance: comfortable throughout block  Medications:    Medications: bupivacaine (pf) (MARCAINE) injection 0.75% - Perineural   30 mL - 9/3/2024 6:55:00 AM    Additional Notes  A time out was conducted. Site mariza confirmed with team and patient. Allergies reviewed.   Vital signs stable throughout block. RN monitoring vitals throughout.   Needle advanced under continuous ultrasound guidance.  Local injected incrementally after confirming  negative aspiration. No signs or symptoms of intravascular or intraneural injection noted.   No persistent paresthesias elicited or expressed. Patient tolerated procedure well.  30cc of 0.375% Bupi with epinephrine 1:300K, PF dexamethasone 1 mg, and clonidine 50 mcg used for the blocks on each side .

## 2024-09-03 NOTE — ANESTHESIA POSTPROCEDURE EVALUATION
Anesthesia Post Evaluation    Patient: Arturo Salguero III    Procedure(s) Performed: Procedure(s) (LRB):  LYMPHADENECTOMY, RETROPERITONEUM (N/A)  LYMPHADENECTOMY, PELVIS (Right)    Final Anesthesia Type: general      Patient location during evaluation: PACU  Patient participation: Yes- Able to Participate  Level of consciousness: oriented and awake  Post-procedure vital signs: reviewed and stable  Pain management: adequate  Airway patency: patent    PONV status at discharge: No PONV  Anesthetic complications: no      Cardiovascular status: stable  Respiratory status: unassisted, spontaneous ventilation and face mask  Hydration status: euvolemic  Follow-up not needed.              Vitals Value Taken Time   /77 09/03/24 1547   Temp 36 °C (96.8 °F) 09/03/24 1535   Pulse 87 09/03/24 1551   Resp 21 09/03/24 1551   SpO2 100 % 09/03/24 1551   Vitals shown include unfiled device data.      No case tracking events are documented in the log.      Pain/Lola Score: Pain Rating Prior to Med Admin: 5 (9/3/2024  3:49 PM)  Pain Rating Post Med Admin: 0 (9/3/2024  6:45 AM)  Lola Score: 8 (9/3/2024  3:45 PM)

## 2024-09-03 NOTE — INTERVAL H&P NOTE
The patient has been examined and the H&P has been reviewed:    I concur with the findings and no changes have occurred since H&P was written.    Surgery risks, benefits and alternative options discussed and understood by patient/family.    Patient was assessed preoperatively, found to be appropriate in behavior. The risks, benefits, and alternatives to procedures were discussed, and questions were answered. Plan to proceed with described procedure.    Patient Active Problem List    Diagnosis Date Noted    BMI 29.0-29.9,adult 08/26/2024    History of deep vein thrombosis 07/20/2023    Malignant neoplasm of descended right testis 05/05/2023

## 2024-09-04 LAB
ANION GAP SERPL CALC-SCNC: 12 MMOL/L (ref 8–16)
BASOPHILS # BLD AUTO: 0.01 K/UL (ref 0–0.2)
BASOPHILS NFR BLD: 0.1 % (ref 0–1.9)
BUN SERPL-MCNC: 16 MG/DL (ref 6–20)
CALCIUM SERPL-MCNC: 8.5 MG/DL (ref 8.7–10.5)
CHLORIDE SERPL-SCNC: 104 MMOL/L (ref 95–110)
CO2 SERPL-SCNC: 19 MMOL/L (ref 23–29)
CREAT SERPL-MCNC: 1.1 MG/DL (ref 0.5–1.4)
DIFFERENTIAL METHOD BLD: ABNORMAL
EOSINOPHIL # BLD AUTO: 0 K/UL (ref 0–0.5)
EOSINOPHIL NFR BLD: 0 % (ref 0–8)
ERYTHROCYTE [DISTWIDTH] IN BLOOD BY AUTOMATED COUNT: 15 % (ref 11.5–14.5)
EST. GFR  (NO RACE VARIABLE): >60 ML/MIN/1.73 M^2
GLUCOSE SERPL-MCNC: 118 MG/DL (ref 70–110)
GLUCOSE SERPL-MCNC: 152 MG/DL (ref 70–110)
HCO3 UR-SCNC: 20.7 MMOL/L (ref 24–28)
HCT VFR BLD AUTO: 44.1 % (ref 40–54)
HCT VFR BLD CALC: 38 %PCV (ref 36–54)
HGB BLD-MCNC: 13.5 G/DL (ref 14–18)
IMM GRANULOCYTES # BLD AUTO: 0.05 K/UL (ref 0–0.04)
IMM GRANULOCYTES NFR BLD AUTO: 0.4 % (ref 0–0.5)
LYMPHOCYTES # BLD AUTO: 1.5 K/UL (ref 1–4.8)
LYMPHOCYTES NFR BLD: 12.6 % (ref 18–48)
MAGNESIUM SERPL-MCNC: 1.8 MG/DL (ref 1.6–2.6)
MCH RBC QN AUTO: 27.6 PG (ref 27–31)
MCHC RBC AUTO-ENTMCNC: 30.6 G/DL (ref 32–36)
MCV RBC AUTO: 90 FL (ref 82–98)
MONOCYTES # BLD AUTO: 1.4 K/UL (ref 0.3–1)
MONOCYTES NFR BLD: 11.6 % (ref 4–15)
NEUTROPHILS # BLD AUTO: 9 K/UL (ref 1.8–7.7)
NEUTROPHILS NFR BLD: 75.3 % (ref 38–73)
NRBC BLD-RTO: 0 /100 WBC
PCO2 BLDA: 36.8 MMHG (ref 35–45)
PH SMN: 7.36 [PH] (ref 7.35–7.45)
PHOSPHATE SERPL-MCNC: 3.6 MG/DL (ref 2.7–4.5)
PLATELET # BLD AUTO: 277 K/UL (ref 150–450)
PMV BLD AUTO: 10.4 FL (ref 9.2–12.9)
PO2 BLDA: 131 MMHG (ref 80–100)
POC BE: -5 MMOL/L
POC IONIZED CALCIUM: 1.15 MMOL/L (ref 1.06–1.42)
POC SATURATED O2: 99 % (ref 95–100)
POC TCO2: 22 MMOL/L (ref 23–27)
POTASSIUM BLD-SCNC: 3.7 MMOL/L (ref 3.5–5.1)
POTASSIUM SERPL-SCNC: 4.2 MMOL/L (ref 3.5–5.1)
RBC # BLD AUTO: 4.89 M/UL (ref 4.6–6.2)
SAMPLE: ABNORMAL
SODIUM BLD-SCNC: 138 MMOL/L (ref 136–145)
SODIUM SERPL-SCNC: 135 MMOL/L (ref 136–145)
WBC # BLD AUTO: 11.93 K/UL (ref 3.9–12.7)

## 2024-09-04 PROCEDURE — 99900035 HC TECH TIME PER 15 MIN (STAT)

## 2024-09-04 PROCEDURE — 85025 COMPLETE CBC W/AUTO DIFF WBC: CPT | Performed by: STUDENT IN AN ORGANIZED HEALTH CARE EDUCATION/TRAINING PROGRAM

## 2024-09-04 PROCEDURE — 94799 UNLISTED PULMONARY SVC/PX: CPT

## 2024-09-04 PROCEDURE — 84100 ASSAY OF PHOSPHORUS: CPT | Performed by: STUDENT IN AN ORGANIZED HEALTH CARE EDUCATION/TRAINING PROGRAM

## 2024-09-04 PROCEDURE — 63600175 PHARM REV CODE 636 W HCPCS: Performed by: STUDENT IN AN ORGANIZED HEALTH CARE EDUCATION/TRAINING PROGRAM

## 2024-09-04 PROCEDURE — 11000001 HC ACUTE MED/SURG PRIVATE ROOM

## 2024-09-04 PROCEDURE — 80048 BASIC METABOLIC PNL TOTAL CA: CPT | Performed by: STUDENT IN AN ORGANIZED HEALTH CARE EDUCATION/TRAINING PROGRAM

## 2024-09-04 PROCEDURE — 36415 COLL VENOUS BLD VENIPUNCTURE: CPT | Performed by: STUDENT IN AN ORGANIZED HEALTH CARE EDUCATION/TRAINING PROGRAM

## 2024-09-04 PROCEDURE — 25000003 PHARM REV CODE 250

## 2024-09-04 PROCEDURE — 83735 ASSAY OF MAGNESIUM: CPT | Performed by: STUDENT IN AN ORGANIZED HEALTH CARE EDUCATION/TRAINING PROGRAM

## 2024-09-04 PROCEDURE — 99231 SBSQ HOSP IP/OBS SF/LOW 25: CPT | Mod: ,,, | Performed by: ANESTHESIOLOGY

## 2024-09-04 PROCEDURE — 94761 N-INVAS EAR/PLS OXIMETRY MLT: CPT

## 2024-09-04 PROCEDURE — 25000003 PHARM REV CODE 250: Performed by: STUDENT IN AN ORGANIZED HEALTH CARE EDUCATION/TRAINING PROGRAM

## 2024-09-04 RX ORDER — PREGABALIN 150 MG/1
150 CAPSULE ORAL NIGHTLY
Status: DISCONTINUED | OUTPATIENT
Start: 2024-09-04 | End: 2024-09-07 | Stop reason: HOSPADM

## 2024-09-04 RX ORDER — MAGNESIUM SULFATE HEPTAHYDRATE 40 MG/ML
2 INJECTION, SOLUTION INTRAVENOUS ONCE
Status: COMPLETED | OUTPATIENT
Start: 2024-09-04 | End: 2024-09-04

## 2024-09-04 RX ORDER — METHOCARBAMOL 750 MG/1
750 TABLET, FILM COATED ORAL EVERY 6 HOURS
Status: DISCONTINUED | OUTPATIENT
Start: 2024-09-04 | End: 2024-09-06

## 2024-09-04 RX ADMIN — HEPARIN SODIUM 5000 UNITS: 5000 INJECTION INTRAVENOUS; SUBCUTANEOUS at 10:09

## 2024-09-04 RX ADMIN — HEPARIN SODIUM 5000 UNITS: 5000 INJECTION INTRAVENOUS; SUBCUTANEOUS at 01:09

## 2024-09-04 RX ADMIN — OXYCODONE HYDROCHLORIDE 10 MG: 10 TABLET ORAL at 08:09

## 2024-09-04 RX ADMIN — OXYCODONE HYDROCHLORIDE 10 MG: 10 TABLET ORAL at 01:09

## 2024-09-04 RX ADMIN — CELECOXIB 200 MG: 200 CAPSULE ORAL at 08:09

## 2024-09-04 RX ADMIN — MAGNESIUM SULFATE HEPTAHYDRATE 2 G: 40 INJECTION, SOLUTION INTRAVENOUS at 06:09

## 2024-09-04 RX ADMIN — METHOCARBAMOL 750 MG: 750 TABLET ORAL at 11:09

## 2024-09-04 RX ADMIN — ACETAMINOPHEN 1000 MG: 500 TABLET ORAL at 06:09

## 2024-09-04 RX ADMIN — HEPARIN SODIUM 5000 UNITS: 5000 INJECTION INTRAVENOUS; SUBCUTANEOUS at 05:09

## 2024-09-04 RX ADMIN — PROMETHAZINE HYDROCHLORIDE 25 MG: 12.5 TABLET ORAL at 04:09

## 2024-09-04 RX ADMIN — ACETAMINOPHEN 1000 MG: 500 TABLET ORAL at 11:09

## 2024-09-04 RX ADMIN — METHOCARBAMOL 750 MG: 750 TABLET ORAL at 06:09

## 2024-09-04 RX ADMIN — PREGABALIN 150 MG: 150 CAPSULE ORAL at 09:09

## 2024-09-04 RX ADMIN — ALVIMOPAN 12 MG: 12 CAPSULE ORAL at 08:09

## 2024-09-04 RX ADMIN — ACETAMINOPHEN 1000 MG: 500 TABLET ORAL at 05:09

## 2024-09-04 RX ADMIN — SODIUM CHLORIDE, POTASSIUM CHLORIDE, SODIUM LACTATE AND CALCIUM CHLORIDE: 600; 310; 30; 20 INJECTION, SOLUTION INTRAVENOUS at 04:09

## 2024-09-04 RX ADMIN — OXYCODONE HYDROCHLORIDE 10 MG: 10 TABLET ORAL at 04:09

## 2024-09-04 NOTE — ASSESSMENT & PLAN NOTE
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    - Diet: Clear Liquid Diet (1 oz/hr)  - Ambulate 5x daily   - Incentive spirometer usage, 10x per hour   - MMPC  - PNC Pain catheters in place   - Hemoglobin stable  - Creatinine at baseline     Dispo: Continue inpatient care

## 2024-09-04 NOTE — SUBJECTIVE & OBJECTIVE
"Interval History: The patient is s/p retroperitoneal lymph node dissection. No acute events overnight. Hemodynamically stable. He remains afebrile. Pain controlled. He endorses some nausea however no vomiting and minimal belching. Has not passed flatus.     Review of Systems  Objective:     Temp:  [96.8 °F (36 °C)-98.6 °F (37 °C)] 98.3 °F (36.8 °C)  Pulse:  [77-93] 77  Resp:  [16-25] 17  SpO2:  [96 %-100 %] 99 %  BP: (119-151)/(63-83) 143/69     Body mass index is 29.56 kg/m².           Drains       Drain  Duration                  Closed/Suction Drain 09/03/24 1413 Right;Lateral RLQ Bulb 15 Fr. <1 day         Urethral Catheter 09/03/24 0726 Silicone;Non-latex 16 Fr. <1 day                     Physical Exam  Vitals reviewed.   Constitutional:       Appearance: Normal appearance.   Eyes:      Extraocular Movements: Extraocular movements intact.      Pupils: Pupils are equal, round, and reactive to light.   Pulmonary:      Effort: Pulmonary effort is normal.   Abdominal:      General: Abdomen is flat.      Palpations: Abdomen is soft.      Tenderness: There is no abdominal tenderness. There is no guarding or rebound.      Comments: Surgical incisions clean, dry, and intact   JPSS   Genitourinary:     Comments: 16 fr kurtz catheter in place draining clear yellow urine  Skin:     General: Skin is warm.      Capillary Refill: Capillary refill takes less than 2 seconds.   Neurological:      General: No focal deficit present.      Mental Status: He is alert.   Psychiatric:         Mood and Affect: Mood normal.           Significant Labs:    BMP:  Recent Labs   Lab 09/03/24  1543 09/04/24  0313    135*   K 4.2 4.2    104   CO2 16* 19*   BUN 12 16   CREATININE 1.2 1.1   CALCIUM 8.5* 8.5*       CBC:   Recent Labs   Lab 09/03/24  1543 09/04/24  0313   WBC 14.48* 11.93   HGB 13.6* 13.5*   HCT 42.3 44.1    277       Blood Culture: No results for input(s): "LABBLOO" in the last 168 hours.  Urine Culture: No " "results for input(s): "LABURIN" in the last 168 hours.  Urine Studies: No results for input(s): "COLORU", "APPEARANCEUA", "PHUR", "SPECGRAV", "PROTEINUA", "GLUCUA", "KETONESU", "BILIRUBINUA", "OCCULTUA", "NITRITE", "UROBILINOGEN", "LEUKOCYTESUR", "RBCUA", "WBCUA", "BACTERIA", "SQUAMEPITHEL", "HYALINECASTS" in the last 168 hours.    Invalid input(s): "WRIGHTSUR"    Significant Imaging:  All pertinent imaging results/findings from the past 24 hours have been reviewed.                "

## 2024-09-04 NOTE — ANESTHESIA POST-OP PAIN MANAGEMENT
Acute Pain Service Progress Note    Arturo Salguero III is a 28 y.o., male, [id number].    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       Subjective:     General appearance of alert, oriented, no complaints   Pain with rest: 4    Faces   Pain with movement: 7    Faces   Side Effects    1. Pruritis No    2. Nausea No    3. Motor Blockade No, 0=Ability to raise lower extremities off bed    4. Sedation No, 1=awake and alert    Objective:     Catheter site clean, dry, intact      Vitals   Vitals:    09/04/24 0811   BP:    Pulse:    Resp: 16   Temp:         Labs    Admission on 09/03/2024   Component Date Value Ref Range Status    Group & Rh 09/03/2024 O POS   Final    Indirect Kayla 09/03/2024 NEG   Final    UNIT NUMBER 09/03/2024 [id number]   Preliminary    Product Code 09/03/2024 [id number]   Preliminary    DISPENSE STATUS 09/03/2024 CROSSMATCHED   Preliminary    CODING SYSTEM 09/03/2024 [id number]   Preliminary    Unit Blood Type Code 09/03/2024 5100   Preliminary    Unit Blood Type 09/03/2024 O POS   Preliminary    Unit Expiration 09/03/2024 202409242359   Preliminary    CROSSMATCH INTERPRETATION 09/03/2024 Compatible   Preliminary    UNIT NUMBER 09/03/2024 [id number]   Preliminary    Product Code 09/03/2024 A4617U51   Preliminary    DISPENSE STATUS 09/03/2024 CROSSMATCHED   Preliminary    CODING SYSTEM 09/03/2024 [id number]   Preliminary    Unit Blood Type Code 09/03/2024 5100   Preliminary    Unit Blood Type 09/03/2024 O POS   Preliminary    Unit Expiration 09/03/2024 202409242359   Preliminary    CROSSMATCH INTERPRETATION 09/03/2024 Compatible   Preliminary     UNIT NUMBER 09/03/2024 [id number]   Preliminary    Product Code 09/03/2024 [id number]   Preliminary    DISPENSE STATUS 09/03/2024 CROSSMATCHED   Preliminary    CODING SYSTEM 09/03/2024 [id number]   Preliminary    Unit Blood Type Code 09/03/2024 5100   Preliminary    Unit Blood Type 09/03/2024 O POS   Preliminary    Unit Expiration 09/03/2024 202409242359   Preliminary    CROSSMATCH INTERPRETATION 09/03/2024 Compatible   Preliminary    UNIT NUMBER 09/03/2024 [id number]   Preliminary    Product Code 09/03/2024 G1581A39   Preliminary    DISPENSE STATUS 09/03/2024 CROSSMATCHED   Preliminary    CODING SYSTEM 09/03/2024 [id number]   Preliminary    Unit Blood Type Code 09/03/2024 5100   Preliminary    Unit Blood Type 09/03/2024 O POS   Preliminary    Unit Expiration 09/03/2024 202409242359   Preliminary    CROSSMATCH INTERPRETATION 09/03/2024 Compatible   Preliminary    Sodium 09/03/2024 138  136 - 145 mmol/L Final    Potassium 09/03/2024 4.2  3.5 - 5.1 mmol/L Final    Chloride 09/03/2024 107  95 - 110 mmol/L Final    CO2 09/03/2024 16 (L)  23 - 29 mmol/L Final    Glucose 09/03/2024 140 (H)  70 - 110 mg/dL Final    BUN 09/03/2024 12  6 - 20 mg/dL Final    Creatinine 09/03/2024 1.2  0.5 - 1.4 mg/dL Final    Calcium 09/03/2024 8.5 (L)  8.7 - 10.5 mg/dL Final    Anion Gap 09/03/2024 15  8 - 16 mmol/L Final    eGFR 09/03/2024 >60.0  >60 mL/min/1.73 m^2 Final    Magnesium 09/03/2024 1.7  1.6 - 2.6 mg/dL Final    Phosphorus 09/03/2024 4.6 (H)  2.7 - 4.5 mg/dL Final    WBC 09/03/2024 14.48 (H)  3.90 - 12.70 K/uL Final    RBC 09/03/2024 4.91  4.60 - 6.20 M/uL Final    Hemoglobin 09/03/2024 13.6 (L)  14.0 - 18.0 g/dL Final    Hematocrit 09/03/2024 42.3  40.0 - 54.0 % Final    MCV 09/03/2024 86  82 - 98 fL Final    MCH 09/03/2024 27.7  27.0 - 31.0 pg Final    MCHC 09/03/2024 32.2  32.0 - 36.0 g/dL Final    RDW 09/03/2024 14.6 (H)  11.5 - 14.5 % Final    Platelets 09/03/2024 274  150 - 450 K/uL Final    MPV 09/03/2024 10.2  9.2 - 12.9  fL Final    Immature Granulocytes 09/03/2024 0.6 (H)  0.0 - 0.5 % Final    Gran # (ANC) 09/03/2024 12.5 (H)  1.8 - 7.7 K/uL Final    Immature Grans (Abs) 09/03/2024 0.08 (H)  0.00 - 0.04 K/uL Final    Lymph # 09/03/2024 0.9 (L)  1.0 - 4.8 K/uL Final    Mono # 09/03/2024 1.0  0.3 - 1.0 K/uL Final    Eos # 09/03/2024 0.0  0.0 - 0.5 K/uL Final    Baso # 09/03/2024 0.02  0.00 - 0.20 K/uL Final    nRBC 09/03/2024 0  0 /100 WBC Final    Gran % 09/03/2024 85.9 (H)  38.0 - 73.0 % Final    Lymph % 09/03/2024 6.4 (L)  18.0 - 48.0 % Final    Mono % 09/03/2024 7.0  4.0 - 15.0 % Final    Eosinophil % 09/03/2024 0.0  0.0 - 8.0 % Final    Basophil % 09/03/2024 0.1  0.0 - 1.9 % Final    Platelet Estimate 09/03/2024 Appears normal   Final    Differential Method 09/03/2024 Automated   Final    Sodium 09/04/2024 135 (L)  136 - 145 mmol/L Final    Potassium 09/04/2024 4.2  3.5 - 5.1 mmol/L Final    Chloride 09/04/2024 104  95 - 110 mmol/L Final    CO2 09/04/2024 19 (L)  23 - 29 mmol/L Final    Glucose 09/04/2024 118 (H)  70 - 110 mg/dL Final    BUN 09/04/2024 16  6 - 20 mg/dL Final    Creatinine 09/04/2024 1.1  0.5 - 1.4 mg/dL Final    Calcium 09/04/2024 8.5 (L)  8.7 - 10.5 mg/dL Final    Anion Gap 09/04/2024 12  8 - 16 mmol/L Final    eGFR 09/04/2024 >60.0  >60 mL/min/1.73 m^2 Final    WBC 09/04/2024 11.93  3.90 - 12.70 K/uL Final    RBC 09/04/2024 4.89  4.60 - 6.20 M/uL Final    Hemoglobin 09/04/2024 13.5 (L)  14.0 - 18.0 g/dL Final    Hematocrit 09/04/2024 44.1  40.0 - 54.0 % Final    MCV 09/04/2024 90  82 - 98 fL Final    MCH 09/04/2024 27.6  27.0 - 31.0 pg Final    MCHC 09/04/2024 30.6 (L)  32.0 - 36.0 g/dL Final    RDW 09/04/2024 15.0 (H)  11.5 - 14.5 % Final    Platelets 09/04/2024 277  150 - 450 K/uL Final    MPV 09/04/2024 10.4  9.2 - 12.9 fL Final    Immature Granulocytes 09/04/2024 0.4  0.0 - 0.5 % Final    Gran # (ANC) 09/04/2024 9.0 (H)  1.8 - 7.7 K/uL Final    Immature Grans (Abs) 09/04/2024 0.05 (H)  0.00 - 0.04 K/uL  Final    Lymph # 09/04/2024 1.5  1.0 - 4.8 K/uL Final    Mono # 09/04/2024 1.4 (H)  0.3 - 1.0 K/uL Final    Eos # 09/04/2024 0.0  0.0 - 0.5 K/uL Final    Baso # 09/04/2024 0.01  0.00 - 0.20 K/uL Final    nRBC 09/04/2024 0  0 /100 WBC Final    Gran % 09/04/2024 75.3 (H)  38.0 - 73.0 % Final    Lymph % 09/04/2024 12.6 (L)  18.0 - 48.0 % Final    Mono % 09/04/2024 11.6  4.0 - 15.0 % Final    Eosinophil % 09/04/2024 0.0  0.0 - 8.0 % Final    Basophil % 09/04/2024 0.1  0.0 - 1.9 % Final    Differential Method 09/04/2024 Automated   Final    Magnesium 09/04/2024 1.8  1.6 - 2.6 mg/dL Final    Phosphorus 09/04/2024 3.6  2.7 - 4.5 mg/dL Final        Meds   Current Facility-Administered Medications   Medication Dose Route Frequency Provider Last Rate Last Admin    0.9%  NaCl infusion (for blood administration)   Intravenous Q24H PRN Gaston Rm MD        acetaminophen tablet 1,000 mg  1,000 mg Oral 4 times per day Blake Morales DO   1,000 mg at 09/04/24 0555    alvimopan capsule 12 mg  12 mg Oral Daily Gaston Rm MD   12 mg at 09/04/24 0811    celecoxib capsule 200 mg  200 mg Oral Daily Blake Morales DO   200 mg at 09/04/24 0811    dextrose 10% bolus 125 mL 125 mL  12.5 g Intravenous PRN Vianney Mccormick MD        dextrose 10% bolus 250 mL 250 mL  25 g Intravenous PRN Vianney Mccormick MD        heparin (porcine) injection 5,000 Units  5,000 Units Subcutaneous Q8H Gaston Rm MD   5,000 Units at 09/04/24 0555    lactated ringers infusion   Intravenous Continuous Gaston Rm MD 75 mL/hr at 09/04/24 0405 New Bag at 09/04/24 0405    LIDOcaine (PF) 10 mg/ml (1%) injection 10 mg  1 mL Intradermal Once PRN Gaston Rm MD        melatonin tablet 6 mg  6 mg Oral Nightly PRN Gaston Rm MD   6 mg at 09/03/24 2347    ondansetron disintegrating tablet 8 mg  8 mg Oral Q8H PRN Gaston Rm MD   8 mg at 09/03/24 2113    ondansetron injection 4 mg  4 mg Intravenous Q12H PRN Blake Morales DO        oxyCODONE  immediate release tablet 5 mg  5 mg Oral Q3H PRN Blake Morales DO        And    oxyCODONE immediate release tablet Tab 10 mg  10 mg Oral Q3H PRN Blake Morales DO   10 mg at 09/04/24 0811    pregabalin capsule 150 mg  150 mg Oral QHS Blake Morales DO        promethazine tablet 25 mg  25 mg Oral Q6H PRN Gaston Rm MD   25 mg at 09/04/24 0428    ropivacaine 0.2% Perineural Pump infusion 500 ML   Perineural Continuous Jazzy Donohue MD   New Bag at 09/03/24 1549    ropivacaine 0.2% Perineural Pump infusion 500 ML   Perineural Continuous Jazzy Donohue MD   New Bag at 09/03/24 1549       Assessment:     Pain control adequate. Patient states that pain has been manageable overall. With movement (has walked the halls) his pain increases and he did utilize 3 oxy 10 prns overnight, but patient feels that his pain is tolerable.     Plan:     Patient doing well, continue present treatment.   - Continue bilateral OSIRIS PNC   - Continue tylenol, celebrex, and lyrica (increased from 75 to 150 nightly)   - Add robaxin 750 every 6 hours   - PRN oxy 5 and 10's every 3 hours prn    Blake Morales DO  [facility] Resident CA-3/PGY-4    Case discussed with staff, Dr. Chávez; final recommendations per attestation above.     Thank you for your consult and allowing us to participate in the care of this patient. We will continue to follow along. Please call the Acute Pain Service at [phone] or Anesthesia at [phone] if you have any questions or concerns.

## 2024-09-04 NOTE — ADDENDUM NOTE
Addendum  created 09/04/24 1120 by Monica Chávez MD    Charge Capture section accepted, Cosign clinical note with attestation

## 2024-09-04 NOTE — NURSING
After ambulating in hallway, patient had some drainage from bottom of his incision. Notified Dr. Nuñez of above, stated to cover spot with gauze and tape, but to be sure not to place tape on the incision. Care ongoing.

## 2024-09-04 NOTE — NURSING TRANSFER
Nursing Transfer Note      9/3/2024   7:54 PM    Reason patient is being transferred: post procedure     Transfer To: 503    Transfer via bed    Transfer with n/a     Transported by transport     Patient belongings transferred with patient:  with family     Chart send with patient: Yes    Notified: family via text messaging system    Patient reassessed at: 9/3/2024 1930

## 2024-09-04 NOTE — PROGRESS NOTES
[facility]  Urology  Progress Note    Patient Name: Arturo Salguero III  MRN: [id number]  Admission Date: 9/3/2024  Hospital Length of Stay: 1 days  Code Status: Full Code   Attending Provider: Tapan Barrow MD   Primary Care Physician: Glen Andrade MD    Subjective:     HPI:  Arturo Salguero III is a 28 y.o. male with a history of testicular mass that was surgically removed by Dr. Shah in March of 2023. Postoperatively, patient had elevated tumor markers. In 2023, patient received chemotherapy administered by Dr. Alston. Mr. Salguero denies any current symptoms related to his condition.     Mr. Salguero's CT reveals enlarged lymph nodes in the retroperitoneum and right pelvic area. These lymph nodes were noted to be enlarged prior to the patient's testicle removal and chemotherapy treatment. The enlargement has persisted and slightly increased in size since the initial detection.     Mr. Salguero's current tumor markers are negative, indicating a positive response to previous treatment.     Mr. Salguero is aware of the potential risks to fertility associated with his upcoming retroperitoneal lymph node dissection (RPLND) surgery, including the possibility of post-operative ejaculatory issues. Despite these risks, the surgical team plans to employ nerve-sparing techniques during the procedure to maximize the chances of maintaining normal ejaculatory function.     No longer taking Apixaban     Interval History: The patient is s/p retroperitoneal lymph node dissection. No acute events overnight. Hemodynamically stable. He remains afebrile. Pain controlled. He endorses some nausea however no vomiting and minimal belching. Has not passed flatus.     Review of Systems  Objective:     Temp:  [96.8 °F (36 °C)-98.6 °F (37 °C)] 98.3 °F (36.8 °C)  Pulse:  [77-93] 77  Resp:  [16-25] 17  SpO2:  [96 %-100 %] 99 %  BP: (119-151)/(63-83) 143/69     Body mass index is 29.56 kg/m².           Drains       Drain  Duration              "     Closed/Suction Drain 09/03/24 1413 Right;Lateral RLQ Bulb 15 Fr. <1 day         Urethral Catheter 09/03/24 0726 Silicone;Non-latex 16 Fr. <1 day                     Physical Exam  Vitals reviewed.   Constitutional:       Appearance: Normal appearance.   Eyes:      Extraocular Movements: Extraocular movements intact.      Pupils: Pupils are equal, round, and reactive to light.   Pulmonary:      Effort: Pulmonary effort is normal.   Abdominal:      General: Abdomen is flat.      Palpations: Abdomen is soft.      Tenderness: There is no abdominal tenderness. There is no guarding or rebound.      Comments: Surgical incisions clean, dry, and intact   [facility]   Genitourinary:     Comments: 16 fr kurtz catheter in place draining clear yellow urine  Skin:     General: Skin is warm.      Capillary Refill: Capillary refill takes less than 2 seconds.   Neurological:      General: No focal deficit present.      Mental Status: He is alert.   Psychiatric:         Mood and Affect: Mood normal.           Significant Labs:    BMP:  Recent Labs   Lab 09/03/24  1543 09/04/24  0313    135*   K 4.2 4.2    104   CO2 16* 19*   BUN 12 16   CREATININE 1.2 1.1   CALCIUM 8.5* 8.5*       CBC:   Recent Labs   Lab 09/03/24  1543 09/04/24  0313   WBC 14.48* 11.93   HGB 13.6* 13.5*   HCT 42.3 44.1    277       Blood Culture: No results for input(s): "LABBLOO" in the last 168 hours.  Urine Culture: No results for input(s): "LABURIN" in the last 168 hours.  Urine Studies: No results for input(s): "COLORU", "APPEARANCEUA", "PHUR", "SPECGRAV", "PROTEINUA", "GLUCUA", "KETONESU", "BILIRUBINUA", "OCCULTUA", "NITRITE", "UROBILINOGEN", "LEUKOCYTESUR", "RBCUA", "WBCUA", "BACTERIA", "SQUAMEPITHEL", "HYALINECASTS" in the last 168 hours.    Invalid input(s): "WRIGHTSUR"    Significant Imaging:  All pertinent imaging results/findings from the past 24 hours have been reviewed.                  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    - Diet: Clear Liquid Diet (1 oz/hr)  - Ambulate 5x daily   - Incentive spirometer usage, 10x per hour   - MMPC  - PNC Pain catheters in place   - Hemoglobin stable  - Creatinine at baseline     Dispo: Continue inpatient care         VTE Risk Mitigation (From admission, onward)           Ordered     heparin (porcine) injection 5,000 Units  Every 8 hours         09/03/24 4784                    Cristiana Lorenz MD  Urology  [facility] - Surgery

## 2024-09-04 NOTE — PLAN OF CARE
[facility]  Initial Discharge Assessment       Primary Care Provider: Glen Andrade MD    Admission Diagnosis: Non-seminomatous cancer of right testis [C62.91]  Testicular cancer [C62.90]    Admission Date: 9/3/2024  Expected Discharge Date: 9/8/2024         Payor: BLUE CROSS BLUE SHIELD / Plan: BLUE CONNECT / Product Type: HMO /     Extended Emergency Contact Information  Primary Emergency Contact: trevon german  Mobile Phone: [phone]  Relation: Mother  Secondary Emergency Contact: Chula Kuhn   [place]  Mobile Phone: [phone]  Relation: Significant other    Discharge Plan A: Home with family         [facility]  [address] [address]  Phone: [phone] Fax: [phone]    [facility]  [address] [place] [address]  Phone: [phone] Fax: [phone]    [id number] - [place] - [address]  [address] [address]  Phone: [phone] Fax: [phone]      Initial Assessment (most recent)       Adult Discharge Assessment - 09/04/24 1012          Discharge Assessment    Assessment Type Discharge Planning Assessment     Confirmed/corrected address, phone number and insurance Yes     Confirmed Demographics Correct on Facesheet     Source of Information patient     Does patient/caregiver understand observation status Yes     Communicated PAULA with patient/caregiver Yes     People in Home significant other     Facility Arrived From: Home     Do you expect to return to your current living situation? Yes     Do you have help at home or someone to help you manage your care at home? Yes     Who are your caregiver(s) and their phone number(s)? Chula Kuhn (Significant other) [phone]     Prior to hospitilization cognitive status: Alert/Oriented     Current cognitive status: Alert/Oriented     Equipment Currently Used at Home none     Readmission within 30 days? No      Patient currently being followed by outpatient case management? No     Do you currently have service(s) that help you manage your care at home? No     Do you take prescription medications? Yes     Do you have prescription coverage? Yes     Do you have any problems affording any of your prescribed medications? No     Is the patient taking medications as prescribed? yes     Who is going to help you get home at discharge? [facility]     How do you get to doctors appointments? family or friend will provide     Are you on dialysis? No     Do you take coumadin? No     Discharge Plan A Home with family     DME Needed Upon Discharge  none        Physical Activity    On average, how many days per week do you engage in moderate to strenuous exercise (like a brisk walk)? 3 days     On average, how many minutes do you engage in exercise at this level? 30 min        Financial Resource Strain    How hard is it for you to pay for the very basics like food, housing, medical care, and heating? Not hard at all        Housing Stability    In the last 12 months, was there a time when you were not able to pay the mortgage or rent on time? No     At any time in the past 12 months, were you homeless or living in a shelter (including now)? No        Transportation Needs    Has the lack of transportation kept you from medical appointments, meetings, work or from getting things needed for daily living? No        Food Insecurity    Within the past 12 months, you worried that your food would run out before you got the money to buy more. Never true     Within the past 12 months, the food you bought just didn't last and you didn't have money to get more. Never true        Stress    Do you feel stress - tense, restless, nervous, or anxious, or unable to sleep at night because your mind is troubled all the time - these days? Not at all        Social Isolation    How often do you feel lonely or isolated from those around you?  Never         Alcohol Use    Q1: How often do you have a drink containing alcohol? Never     Q2: How many drinks containing alcohol do you have on a typical day when you are drinking? Patient does not drink     Q3: How often do you have six or more drinks on one occasion? Never        Utilities    In the past 12 months has the electric, gas, oil, or water company threatened to shut off services in your home? No        Health Literacy    How often do you need to have someone help you when you read instructions, pamphlets, or other written material from your doctor or pharmacy? Never                   Spoke with patient and Dilcia at bedside to complete d/c planning assessment. Patient lives with dilcia in a ground level Town home. Patient's bedroom is on the second floor of home with approx 15 steps to enter. Independent with ADL'S. No DME in home. Verified PCP, Pharmacy and health insurance. No d.c needs anticipated. Patient will have help at home from family. Discharge Plan A and Plan B have been determined by review of patient's clinical status, future medical and therapeutic needs, and coverage/benefits for post-acute care in coordination with multidisciplinary team members.        Daksha HARDEN  Case Management  [facility]  [phone]

## 2024-09-05 LAB
ANION GAP SERPL CALC-SCNC: 6 MMOL/L (ref 8–16)
BASOPHILS # BLD AUTO: 0.02 K/UL (ref 0–0.2)
BASOPHILS NFR BLD: 0.2 % (ref 0–1.9)
BUN SERPL-MCNC: 14 MG/DL (ref 6–20)
CALCIUM SERPL-MCNC: 8.3 MG/DL (ref 8.7–10.5)
CHLORIDE SERPL-SCNC: 105 MMOL/L (ref 95–110)
CO2 SERPL-SCNC: 26 MMOL/L (ref 23–29)
CREAT SERPL-MCNC: 1 MG/DL (ref 0.5–1.4)
DIFFERENTIAL METHOD BLD: ABNORMAL
EOSINOPHIL # BLD AUTO: 0 K/UL (ref 0–0.5)
EOSINOPHIL NFR BLD: 0.2 % (ref 0–8)
ERYTHROCYTE [DISTWIDTH] IN BLOOD BY AUTOMATED COUNT: 14.9 % (ref 11.5–14.5)
EST. GFR  (NO RACE VARIABLE): >60 ML/MIN/1.73 M^2
GLUCOSE SERPL-MCNC: 83 MG/DL (ref 70–110)
HCT VFR BLD AUTO: 36.5 % (ref 40–54)
HGB BLD-MCNC: 11.8 G/DL (ref 14–18)
IMM GRANULOCYTES # BLD AUTO: 0.03 K/UL (ref 0–0.04)
IMM GRANULOCYTES NFR BLD AUTO: 0.4 % (ref 0–0.5)
LYMPHOCYTES # BLD AUTO: 2.3 K/UL (ref 1–4.8)
LYMPHOCYTES NFR BLD: 27.5 % (ref 18–48)
MAGNESIUM SERPL-MCNC: 2.2 MG/DL (ref 1.6–2.6)
MCH RBC QN AUTO: 28.5 PG (ref 27–31)
MCHC RBC AUTO-ENTMCNC: 32.3 G/DL (ref 32–36)
MCV RBC AUTO: 88 FL (ref 82–98)
MONOCYTES # BLD AUTO: 1 K/UL (ref 0.3–1)
MONOCYTES NFR BLD: 12 % (ref 4–15)
NEUTROPHILS # BLD AUTO: 5.1 K/UL (ref 1.8–7.7)
NEUTROPHILS NFR BLD: 59.7 % (ref 38–73)
NRBC BLD-RTO: 0 /100 WBC
PHOSPHATE SERPL-MCNC: 1.6 MG/DL (ref 2.7–4.5)
PLATELET # BLD AUTO: 238 K/UL (ref 150–450)
PMV BLD AUTO: 11 FL (ref 9.2–12.9)
POTASSIUM SERPL-SCNC: 3.8 MMOL/L (ref 3.5–5.1)
RBC # BLD AUTO: 4.14 M/UL (ref 4.6–6.2)
SODIUM SERPL-SCNC: 137 MMOL/L (ref 136–145)
WBC # BLD AUTO: 8.48 K/UL (ref 3.9–12.7)

## 2024-09-05 PROCEDURE — 84100 ASSAY OF PHOSPHORUS: CPT | Performed by: STUDENT IN AN ORGANIZED HEALTH CARE EDUCATION/TRAINING PROGRAM

## 2024-09-05 PROCEDURE — 11000001 HC ACUTE MED/SURG PRIVATE ROOM

## 2024-09-05 PROCEDURE — 99231 SBSQ HOSP IP/OBS SF/LOW 25: CPT | Mod: ,,, | Performed by: ANESTHESIOLOGY

## 2024-09-05 PROCEDURE — 83735 ASSAY OF MAGNESIUM: CPT | Performed by: STUDENT IN AN ORGANIZED HEALTH CARE EDUCATION/TRAINING PROGRAM

## 2024-09-05 PROCEDURE — 25000003 PHARM REV CODE 250: Performed by: STUDENT IN AN ORGANIZED HEALTH CARE EDUCATION/TRAINING PROGRAM

## 2024-09-05 PROCEDURE — 25000003 PHARM REV CODE 250

## 2024-09-05 PROCEDURE — 63600175 PHARM REV CODE 636 W HCPCS: Performed by: STUDENT IN AN ORGANIZED HEALTH CARE EDUCATION/TRAINING PROGRAM

## 2024-09-05 PROCEDURE — 80048 BASIC METABOLIC PNL TOTAL CA: CPT | Performed by: STUDENT IN AN ORGANIZED HEALTH CARE EDUCATION/TRAINING PROGRAM

## 2024-09-05 PROCEDURE — 36415 COLL VENOUS BLD VENIPUNCTURE: CPT | Performed by: STUDENT IN AN ORGANIZED HEALTH CARE EDUCATION/TRAINING PROGRAM

## 2024-09-05 PROCEDURE — 85025 COMPLETE CBC W/AUTO DIFF WBC: CPT | Performed by: STUDENT IN AN ORGANIZED HEALTH CARE EDUCATION/TRAINING PROGRAM

## 2024-09-05 RX ORDER — ACETAMINOPHEN 500 MG
1000 TABLET ORAL EVERY 8 HOURS
Status: DISCONTINUED | OUTPATIENT
Start: 2024-09-05 | End: 2024-09-07 | Stop reason: HOSPADM

## 2024-09-05 RX ADMIN — ACETAMINOPHEN 1000 MG: 500 TABLET ORAL at 03:09

## 2024-09-05 RX ADMIN — OXYCODONE 5 MG: 5 TABLET ORAL at 03:09

## 2024-09-05 RX ADMIN — METHOCARBAMOL 750 MG: 750 TABLET ORAL at 01:09

## 2024-09-05 RX ADMIN — PREGABALIN 150 MG: 150 CAPSULE ORAL at 09:09

## 2024-09-05 RX ADMIN — HEPARIN SODIUM 5000 UNITS: 5000 INJECTION INTRAVENOUS; SUBCUTANEOUS at 09:09

## 2024-09-05 RX ADMIN — HEPARIN SODIUM 5000 UNITS: 5000 INJECTION INTRAVENOUS; SUBCUTANEOUS at 03:09

## 2024-09-05 RX ADMIN — METHOCARBAMOL 750 MG: 750 TABLET ORAL at 11:09

## 2024-09-05 RX ADMIN — CELECOXIB 200 MG: 200 CAPSULE ORAL at 08:09

## 2024-09-05 RX ADMIN — HEPARIN SODIUM 5000 UNITS: 5000 INJECTION INTRAVENOUS; SUBCUTANEOUS at 05:09

## 2024-09-05 RX ADMIN — ACETAMINOPHEN 1000 MG: 500 TABLET ORAL at 09:09

## 2024-09-05 RX ADMIN — METHOCARBAMOL 750 MG: 750 TABLET ORAL at 06:09

## 2024-09-05 RX ADMIN — ACETAMINOPHEN 1000 MG: 500 TABLET ORAL at 05:09

## 2024-09-05 RX ADMIN — METHOCARBAMOL 750 MG: 750 TABLET ORAL at 05:09

## 2024-09-05 RX ADMIN — ALVIMOPAN 12 MG: 12 CAPSULE ORAL at 08:09

## 2024-09-05 RX ADMIN — OXYCODONE HYDROCHLORIDE 10 MG: 10 TABLET ORAL at 08:09

## 2024-09-05 NOTE — PROGRESS NOTES
[facility]  Urology  Progress Note    Patient Name: Arturo Salguero III  MRN: [id number]  Admission Date: 9/3/2024  Hospital Length of Stay: 2 days  Code Status: Full Code   Attending Provider: Tapan Barrow MD   Primary Care Physician: Glen Andrade MD    Subjective:     HPI:  28M s/p RPLND on 9/3    Interval History: NAEO.  AFVSS.  Pain well controlled.  Passing gas but no bowel movements yet.  Ambulating.      Drain 75/140/135     Review of Systems: per HPI   Objective:     Temp:  [96 °F (35.6 °C)-98.6 °F (37 °C)] 97.9 °F (36.6 °C)  Pulse:  [68-78] 71  Resp:  [16-18] 16  SpO2:  [97 %-99 %] 99 %  BP: (115-144)/(54-81) 115/54     Body mass index is 29.56 kg/m².           Drains       Drain  Duration                  Closed/Suction Drain 09/03/24 1413 Right;Lateral RLQ Bulb 15 Fr. 1 day                     Physical Exam  Vitals reviewed.   Constitutional:       Appearance: Normal appearance.   Eyes:      Extraocular Movements: Extraocular movements intact.      Pupils: Pupils are equal, round, and reactive to light.   Pulmonary:      Effort: Pulmonary effort is normal.   Abdominal:      General: Abdomen is flat.      Palpations: Abdomen is soft.      Tenderness: There is no abdominal tenderness. There is no guarding or rebound.      Comments: Surgical incisions clean, dry, and intact   Abdomen soft, appropriately tender  JPSS   Skin:     General: Skin is warm.      Capillary Refill: Capillary refill takes less than 2 seconds.   Neurological:      General: No focal deficit present.      Mental Status: He is alert.   Psychiatric:         Mood and Affect: Mood normal.           Significant Labs:    BMP:  Recent Labs   Lab 09/03/24  1543 09/04/24  0313    135*   K 4.2 4.2    104   CO2 16* 19*   BUN 12 16   CREATININE 1.2 1.1   CALCIUM 8.5* 8.5*       CBC:   Recent Labs   Lab 09/03/24  1133 09/03/24  1543 09/04/24  0313   WBC  --  14.48* 11.93   HGB  --  13.6* 13.5*   HCT 38 42.3 44.1   PLT  --  274  277       All pertinent labs results from the past 24 hours have been reviewed.    Significant Imaging:  All pertinent imaging results/findings from the past 24 hours have been reviewed.                  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    - Diet: CLD this AM, will advance to low fat diet for lunch if still tolerating    - Ambulate 5x daily   - Incentive spirometer usage, 10x per hour   - MMPC  - PNC Pain catheters in place, management per APS    - Hemoglobin stable  - Creatinine at baseline     Dispo: Continue inpatient care         VTE Risk Mitigation (From admission, onward)           Ordered     heparin (porcine) injection 5,000 Units  Every 8 hours         09/03/24 8515                    Carina Betancourt MD  Urology  [facility] - Surgery

## 2024-09-05 NOTE — NURSING
Nurses Note -- 4 Eyes      9/5/2024   9:05 AM      Skin assessed during: Daily Assessment      [x] No Altered Skin Integrity Present    []Prevention Measures Documented      [] Yes- Altered Skin Integrity Present or Discovered   [] LDA Added if Not in Epic (Describe Wound)   [] New Altered Skin Integrity was Present on Admit and Documented in LDA   [] Wound Image Taken    Wound Care Consulted? No    Attending Nurse:  Iva SWANSON RN    Second RN/Staff Member:   LOYDA Altamirano

## 2024-09-05 NOTE — NURSING
Nurses Note -- 4 Eyes      9/5/2024   1:54 AM      Skin assessed during: Q Shift Change      [x] No Altered Skin Integrity Present    []Prevention Measures Documented      [] Yes- Altered Skin Integrity Present or Discovered   [] LDA Added if Not in Epic (Describe Wound)   [] New Altered Skin Integrity was Present on Admit and Documented in LDA   [] Wound Image Taken    Wound Care Consulted? No    Attending Nurse:  LOLA Martinez    Second RN/Staff Member:LOLA Ahumada

## 2024-09-05 NOTE — ASSESSMENT & PLAN NOTE
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    - Diet: advance to full Liquid Diet today  - Ambulate 5x daily   - Incentive spirometer usage, 10x per hour   - MMPC  - PNC Pain catheters in place, management per APS    - Hemoglobin stable  - Creatinine at baseline     Dispo: Continue inpatient care    Depth Of Tumor Invasion (For Histology): adipose tissue V-Y Plasty Text: The defect edges were debeveled with a #15 scalpel blade.  Given the location of the defect, shape of the defect and the proximity to free margins an V-Y advancement flap was deemed most appropriate.  Using a sterile surgical marker, an appropriate advancement flap was drawn incorporating the defect and placing the expected incisions within the relaxed skin tension lines where possible.    The area thus outlined was incised deep to adipose tissue with a #15 scalpel blade.  The skin margins were undermined to an appropriate distance in all directions utilizing iris scissors.

## 2024-09-05 NOTE — SUBJECTIVE & OBJECTIVE
Interval History: NAEO.  AFVSS.  Pain well controlled.  Passing gas but no bowel movements yet.  Ambulating.      Drain 75/140/135     Review of Systems: per HPI   Objective:     Temp:  [96 °F (35.6 °C)-98.6 °F (37 °C)] 97.9 °F (36.6 °C)  Pulse:  [68-78] 71  Resp:  [16-18] 16  SpO2:  [97 %-99 %] 99 %  BP: (115-144)/(54-81) 115/54     Body mass index is 29.56 kg/m².           Drains       Drain  Duration                  Closed/Suction Drain 09/03/24 1413 Right;Lateral RLQ Bulb 15 Fr. 1 day                     Physical Exam  Vitals reviewed.   Constitutional:       Appearance: Normal appearance.   Eyes:      Extraocular Movements: Extraocular movements intact.      Pupils: Pupils are equal, round, and reactive to light.   Pulmonary:      Effort: Pulmonary effort is normal.   Abdominal:      General: Abdomen is flat.      Palpations: Abdomen is soft.      Tenderness: There is no abdominal tenderness. There is no guarding or rebound.      Comments: Surgical incisions clean, dry, and intact   Abdomen soft, appropriately tender  JPSS   Skin:     General: Skin is warm.      Capillary Refill: Capillary refill takes less than 2 seconds.   Neurological:      General: No focal deficit present.      Mental Status: He is alert.   Psychiatric:         Mood and Affect: Mood normal.           Significant Labs:    BMP:  Recent Labs   Lab 09/03/24  1543 09/04/24  0313    135*   K 4.2 4.2    104   CO2 16* 19*   BUN 12 16   CREATININE 1.2 1.1   CALCIUM 8.5* 8.5*       CBC:   Recent Labs   Lab 09/03/24  1133 09/03/24  1543 09/04/24  0313   WBC  --  14.48* 11.93   HGB  --  13.6* 13.5*   HCT 38 42.3 44.1   PLT  --  274 277       All pertinent labs results from the past 24 hours have been reviewed.    Significant Imaging:  All pertinent imaging results/findings from the past 24 hours have been reviewed.

## 2024-09-05 NOTE — ANESTHESIA POST-OP PAIN MANAGEMENT
Acute Pain Service Progress Note    Arturo Salguero III is a 28 y.o., male, [id number].    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       Subjective:     General appearance of alert, oriented, no complaints   Pain with rest: 3    Faces   Pain with movement: 6    Faces   Side Effects    1. Pruritis No    2. Nausea No    3. Motor Blockade No, 0=Ability to raise lower extremities off bed    4. Sedation No, 1=awake and alert    Objective:     Catheter site clean, dry, intact      Vitals   Vitals:    09/05/24 0712   BP: 99/60   Pulse: 74   Resp: 16   Temp: 36.7 °C (98 °F)        Labs    Admission on 09/03/2024   Component Date Value Ref Range Status    Group & Rh 09/03/2024 O POS   Final    Indirect Kayla 09/03/2024 NEG   Final    UNIT NUMBER 09/03/2024 [id number]   Preliminary    Product Code 09/03/2024 [id number]   Preliminary    DISPENSE STATUS 09/03/2024 CROSSMATCHED   Preliminary    CODING SYSTEM 09/03/2024 [id number]   Preliminary    Unit Blood Type Code 09/03/2024 5100   Preliminary    Unit Blood Type 09/03/2024 O POS   Preliminary    Unit Expiration 09/03/2024 202409242359   Preliminary    CROSSMATCH INTERPRETATION 09/03/2024 Compatible   Preliminary    UNIT NUMBER 09/03/2024 [id number]   Preliminary    Product Code 09/03/2024 [id number]   Preliminary    DISPENSE STATUS 09/03/2024 CROSSMATCHED   Preliminary    CODING SYSTEM 09/03/2024 [id number]   Preliminary    Unit Blood Type Code 09/03/2024 5100   Preliminary    Unit Blood Type 09/03/2024 O POS   Preliminary    Unit Expiration 09/03/2024 202409242359   Preliminary    CROSSMATCH INTERPRETATION 09/03/2024  Compatible   Preliminary    UNIT NUMBER 09/03/2024 [id number]   Preliminary    Product Code 09/03/2024 [id number]   Preliminary    DISPENSE STATUS 09/03/2024 CROSSMATCHED   Preliminary    CODING SYSTEM 09/03/2024 [id number]   Preliminary    Unit Blood Type Code 09/03/2024 5100   Preliminary    Unit Blood Type 09/03/2024 O POS   Preliminary    Unit Expiration 09/03/2024 202409242359   Preliminary    CROSSMATCH INTERPRETATION 09/03/2024 Compatible   Preliminary    UNIT NUMBER 09/03/2024 [id number]   Preliminary    Product Code 09/03/2024 [id number]   Preliminary    DISPENSE STATUS 09/03/2024 CROSSMATCHED   Preliminary    CODING SYSTEM 09/03/2024 [id number]   Preliminary    Unit Blood Type Code 09/03/2024 5100   Preliminary    Unit Blood Type 09/03/2024 O POS   Preliminary    Unit Expiration 09/03/2024 202409242359   Preliminary    CROSSMATCH INTERPRETATION 09/03/2024 Compatible   Preliminary    Sodium 09/03/2024 138  136 - 145 mmol/L Final    Potassium 09/03/2024 4.2  3.5 - 5.1 mmol/L Final    Chloride 09/03/2024 107  95 - 110 mmol/L Final    CO2 09/03/2024 16 (L)  23 - 29 mmol/L Final    Glucose 09/03/2024 140 (H)  70 - 110 mg/dL Final    BUN 09/03/2024 12  6 - 20 mg/dL Final    Creatinine 09/03/2024 1.2  0.5 - 1.4 mg/dL Final    Calcium 09/03/2024 8.5 (L)  8.7 - 10.5 mg/dL Final    Anion Gap 09/03/2024 15  8 - 16 mmol/L Final    eGFR 09/03/2024 >60.0  >60 mL/min/1.73 m^2 Final    Magnesium 09/03/2024 1.7  1.6 - 2.6 mg/dL Final    Phosphorus 09/03/2024 4.6 (H)  2.7 - 4.5 mg/dL Final    WBC 09/03/2024 14.48 (H)  3.90 - 12.70 K/uL Final    RBC 09/03/2024 4.91  4.60 - 6.20 M/uL Final    Hemoglobin 09/03/2024 13.6 (L)  14.0 - 18.0 g/dL Final    Hematocrit 09/03/2024 42.3  40.0 - 54.0 % Final    MCV 09/03/2024 86  82 - 98 fL Final    MCH 09/03/2024 27.7  27.0 - 31.0 pg Final    MCHC 09/03/2024 32.2  32.0 - 36.0 g/dL Final    RDW 09/03/2024 14.6 (H)  11.5 - 14.5 % Final    Platelets 09/03/2024 274  150 - 450 K/uL Final    MPV  09/03/2024 10.2  9.2 - 12.9 fL Final    Immature Granulocytes 09/03/2024 0.6 (H)  0.0 - 0.5 % Final    Gran # (ANC) 09/03/2024 12.5 (H)  1.8 - 7.7 K/uL Final    Immature Grans (Abs) 09/03/2024 0.08 (H)  0.00 - 0.04 K/uL Final    Lymph # 09/03/2024 0.9 (L)  1.0 - 4.8 K/uL Final    Mono # 09/03/2024 1.0  0.3 - 1.0 K/uL Final    Eos # 09/03/2024 0.0  0.0 - 0.5 K/uL Final    Baso # 09/03/2024 0.02  0.00 - 0.20 K/uL Final    nRBC 09/03/2024 0  0 /100 WBC Final    Gran % 09/03/2024 85.9 (H)  38.0 - 73.0 % Final    Lymph % 09/03/2024 6.4 (L)  18.0 - 48.0 % Final    Mono % 09/03/2024 7.0  4.0 - 15.0 % Final    Eosinophil % 09/03/2024 0.0  0.0 - 8.0 % Final    Basophil % 09/03/2024 0.1  0.0 - 1.9 % Final    Platelet Estimate 09/03/2024 Appears normal   Final    Differential Method 09/03/2024 Automated   Final    Sodium 09/04/2024 135 (L)  136 - 145 mmol/L Final    Potassium 09/04/2024 4.2  3.5 - 5.1 mmol/L Final    Chloride 09/04/2024 104  95 - 110 mmol/L Final    CO2 09/04/2024 19 (L)  23 - 29 mmol/L Final    Glucose 09/04/2024 118 (H)  70 - 110 mg/dL Final    BUN 09/04/2024 16  6 - 20 mg/dL Final    Creatinine 09/04/2024 1.1  0.5 - 1.4 mg/dL Final    Calcium 09/04/2024 8.5 (L)  8.7 - 10.5 mg/dL Final    Anion Gap 09/04/2024 12  8 - 16 mmol/L Final    eGFR 09/04/2024 >60.0  >60 mL/min/1.73 m^2 Final    WBC 09/04/2024 11.93  3.90 - 12.70 K/uL Final    RBC 09/04/2024 4.89  4.60 - 6.20 M/uL Final    Hemoglobin 09/04/2024 13.5 (L)  14.0 - 18.0 g/dL Final    Hematocrit 09/04/2024 44.1  40.0 - 54.0 % Final    MCV 09/04/2024 90  82 - 98 fL Final    MCH 09/04/2024 27.6  27.0 - 31.0 pg Final    MCHC 09/04/2024 30.6 (L)  32.0 - 36.0 g/dL Final    RDW 09/04/2024 15.0 (H)  11.5 - 14.5 % Final    Platelets 09/04/2024 277  150 - 450 K/uL Final    MPV 09/04/2024 10.4  9.2 - 12.9 fL Final    Immature Granulocytes 09/04/2024 0.4  0.0 - 0.5 % Final    Gran # (ANC) 09/04/2024 9.0 (H)  1.8 - 7.7 K/uL Final    Immature Grans (Abs) 09/04/2024  0.05 (H)  0.00 - 0.04 K/uL Final    Lymph # 09/04/2024 1.5  1.0 - 4.8 K/uL Final    Mono # 09/04/2024 1.4 (H)  0.3 - 1.0 K/uL Final    Eos # 09/04/2024 0.0  0.0 - 0.5 K/uL Final    Baso # 09/04/2024 0.01  0.00 - 0.20 K/uL Final    nRBC 09/04/2024 0  0 /100 WBC Final    Gran % 09/04/2024 75.3 (H)  38.0 - 73.0 % Final    Lymph % 09/04/2024 12.6 (L)  18.0 - 48.0 % Final    Mono % 09/04/2024 11.6  4.0 - 15.0 % Final    Eosinophil % 09/04/2024 0.0  0.0 - 8.0 % Final    Basophil % 09/04/2024 0.1  0.0 - 1.9 % Final    Differential Method 09/04/2024 Automated   Final    Magnesium 09/04/2024 1.8  1.6 - 2.6 mg/dL Final    Phosphorus 09/04/2024 3.6  2.7 - 4.5 mg/dL Final    POC PH 09/03/2024 7.357  7.35 - 7.45 Final    POC PCO2 09/03/2024 36.8  35 - 45 mmHg Final    POC PO2 09/03/2024 131 (H)  80 - 100 mmHg Final    POC HCO3 09/03/2024 20.7 (L)  24 - 28 mmol/L Final    POC BE 09/03/2024 -5 (L)  -2 to 2 mmol/L Final    POC SATURATED O2 09/03/2024 99  95 - 100 % Final    POC Glucose 09/03/2024 152 (H)  70 - 110 mg/dL Final    POC Sodium 09/03/2024 138  136 - 145 mmol/L Final    POC Potassium 09/03/2024 3.7  3.5 - 5.1 mmol/L Final    POC TCO2 09/03/2024 22 (L)  23 - 27 mmol/L Final    POC Ionized Calcium 09/03/2024 1.15  1.06 - 1.42 mmol/L Final    POC Hematocrit 09/03/2024 38  36 - 54 %PCV Final    Sample 09/03/2024 ARTERIAL   Final    Sodium 09/05/2024 137  136 - 145 mmol/L Final    Potassium 09/05/2024 3.8  3.5 - 5.1 mmol/L Final    Chloride 09/05/2024 105  95 - 110 mmol/L Final    CO2 09/05/2024 26  23 - 29 mmol/L Final    Glucose 09/05/2024 83  70 - 110 mg/dL Final    BUN 09/05/2024 14  6 - 20 mg/dL Final    Creatinine 09/05/2024 1.0  0.5 - 1.4 mg/dL Final    Calcium 09/05/2024 8.3 (L)  8.7 - 10.5 mg/dL Final    Anion Gap 09/05/2024 6 (L)  8 - 16 mmol/L Final    eGFR 09/05/2024 >60.0  >60 mL/min/1.73 m^2 Final    WBC 09/05/2024 8.48  3.90 - 12.70 K/uL Final    RBC 09/05/2024 4.14 (L)  4.60 - 6.20 M/uL Final    Hemoglobin  09/05/2024 11.8 (L)  14.0 - 18.0 g/dL Final    Hematocrit 09/05/2024 36.5 (L)  40.0 - 54.0 % Final    MCV 09/05/2024 88  82 - 98 fL Final    MCH 09/05/2024 28.5  27.0 - 31.0 pg Final    MCHC 09/05/2024 32.3  32.0 - 36.0 g/dL Final    RDW 09/05/2024 14.9 (H)  11.5 - 14.5 % Final    Platelets 09/05/2024 238  150 - 450 K/uL Final    MPV 09/05/2024 11.0  9.2 - 12.9 fL Final    Immature Granulocytes 09/05/2024 0.4  0.0 - 0.5 % Final    Gran # (ANC) 09/05/2024 5.1  1.8 - 7.7 K/uL Final    Immature Grans (Abs) 09/05/2024 0.03  0.00 - 0.04 K/uL Final    Lymph # 09/05/2024 2.3  1.0 - 4.8 K/uL Final    Mono # 09/05/2024 1.0  0.3 - 1.0 K/uL Final    Eos # 09/05/2024 0.0  0.0 - 0.5 K/uL Final    Baso # 09/05/2024 0.02  0.00 - 0.20 K/uL Final    nRBC 09/05/2024 0  0 /100 WBC Final    Gran % 09/05/2024 59.7  38.0 - 73.0 % Final    Lymph % 09/05/2024 27.5  18.0 - 48.0 % Final    Mono % 09/05/2024 12.0  4.0 - 15.0 % Final    Eosinophil % 09/05/2024 0.2  0.0 - 8.0 % Final    Basophil % 09/05/2024 0.2  0.0 - 1.9 % Final    Differential Method 09/05/2024 Automated   Final    Magnesium 09/05/2024 2.2  1.6 - 2.6 mg/dL Final    Phosphorus 09/05/2024 1.6 (L)  2.7 - 4.5 mg/dL Final        Meds   Current Facility-Administered Medications   Medication Dose Route Frequency Provider Last Rate Last Admin    0.9%  NaCl infusion (for blood administration)   Intravenous Q24H PRN Gaston Rm MD        acetaminophen tablet 1,000 mg  1,000 mg Oral 4 times per day Blake Morales DO   1,000 mg at 09/05/24 0537    alvimopan capsule 12 mg  12 mg Oral Daily Gaston Rm MD   12 mg at 09/04/24 0811    celecoxib capsule 200 mg  200 mg Oral Daily Blake Morales DO   200 mg at 09/04/24 0811    dextrose 10% bolus 125 mL 125 mL  12.5 g Intravenous PRN Vianney Mccormick MD        dextrose 10% bolus 250 mL 250 mL  25 g Intravenous PRN Vianney Mccormick MD        heparin (porcine) injection 5,000 Units  5,000 Units Subcutaneous Q8H Gaston Rm MD   5,000  Units at 09/05/24 0537    LIDOcaine (PF) 10 mg/ml (1%) injection 10 mg  1 mL Intradermal Once PRN Gaston Rm MD        melatonin tablet 6 mg  6 mg Oral Nightly PRN Gaston Rm MD   6 mg at 09/03/24 2347    methocarbamoL tablet 750 mg  750 mg Oral Q6H Blake Morales DO   750 mg at 09/05/24 0537    ondansetron disintegrating tablet 8 mg  8 mg Oral Q8H PRN Gaston Rm MD   8 mg at 09/03/24 2113    ondansetron injection 4 mg  4 mg Intravenous Q12H PRN Blake Morales DO        oxyCODONE immediate release tablet 5 mg  5 mg Oral Q3H PRN Blake Morales DO        And    oxyCODONE immediate release tablet Tab 10 mg  10 mg Oral Q3H PRN Blake Morales DO   10 mg at 09/04/24 2052    pregabalin capsule 150 mg  150 mg Oral QHS Blake Morales DO   150 mg at 09/04/24 2100    promethazine tablet 25 mg  25 mg Oral Q6H PRN Gaston Rm MD   25 mg at 09/04/24 0428    ropivacaine 0.2% Perineural Pump infusion 500 ML   Perineural Continuous Jazzy Donohue MD   New Bag at 09/03/24 1549    ropivacaine 0.2% Perineural Pump infusion 500 ML   Perineural Continuous Jazzy Donohue MD   New Bag at 09/03/24 1549       Assessment:     Pain control adequate. Continues to mobilize.     Plan:     Patient doing well, continue present treatment.   - Continue bilateral OSIRIS PNC. Plan to pause catheters tomorrow (9/6/24) morning and see how patient does   - Continue tylenol, celebrex, and lyrica (increased from 75 to 150 nightly)   - robaxin 750 every 6 hours   - PRN oxy 5 and 10's every 3 hours prn    Jacob Moore, DO [facility] Resident CA-3/PGY-4    Case discussed with staff, Dr. Chávez; final recommendations per attestation above.     Thank you for your consult and allowing us to participate in the care of this patient. We will continue to follow along. Please call the Acute Pain Service at [phone] or Anesthesia at [phone] if you have any questions or concerns.

## 2024-09-06 LAB
ANION GAP SERPL CALC-SCNC: 5 MMOL/L (ref 8–16)
BASOPHILS # BLD AUTO: 0.02 K/UL (ref 0–0.2)
BASOPHILS NFR BLD: 0.3 % (ref 0–1.9)
BUN SERPL-MCNC: 12 MG/DL (ref 6–20)
CALCIUM SERPL-MCNC: 8.9 MG/DL (ref 8.7–10.5)
CHLORIDE SERPL-SCNC: 103 MMOL/L (ref 95–110)
CO2 SERPL-SCNC: 28 MMOL/L (ref 23–29)
CREAT SERPL-MCNC: 0.9 MG/DL (ref 0.5–1.4)
DIFFERENTIAL METHOD BLD: ABNORMAL
EOSINOPHIL # BLD AUTO: 0.1 K/UL (ref 0–0.5)
EOSINOPHIL NFR BLD: 1.4 % (ref 0–8)
ERYTHROCYTE [DISTWIDTH] IN BLOOD BY AUTOMATED COUNT: 14.4 % (ref 11.5–14.5)
EST. GFR  (NO RACE VARIABLE): >60 ML/MIN/1.73 M^2
GLUCOSE SERPL-MCNC: 86 MG/DL (ref 70–110)
HCT VFR BLD AUTO: 35.1 % (ref 40–54)
HGB BLD-MCNC: 11.1 G/DL (ref 14–18)
IMM GRANULOCYTES # BLD AUTO: 0.03 K/UL (ref 0–0.04)
IMM GRANULOCYTES NFR BLD AUTO: 0.4 % (ref 0–0.5)
LYMPHOCYTES # BLD AUTO: 2.3 K/UL (ref 1–4.8)
LYMPHOCYTES NFR BLD: 32 % (ref 18–48)
MAGNESIUM SERPL-MCNC: 2 MG/DL (ref 1.6–2.6)
MCH RBC QN AUTO: 28.1 PG (ref 27–31)
MCHC RBC AUTO-ENTMCNC: 31.6 G/DL (ref 32–36)
MCV RBC AUTO: 89 FL (ref 82–98)
MONOCYTES # BLD AUTO: 0.9 K/UL (ref 0.3–1)
MONOCYTES NFR BLD: 12.1 % (ref 4–15)
NEUTROPHILS # BLD AUTO: 3.8 K/UL (ref 1.8–7.7)
NEUTROPHILS NFR BLD: 53.8 % (ref 38–73)
NRBC BLD-RTO: 0 /100 WBC
PHOSPHATE SERPL-MCNC: 2.6 MG/DL (ref 2.7–4.5)
PLATELET # BLD AUTO: 233 K/UL (ref 150–450)
PMV BLD AUTO: 10.4 FL (ref 9.2–12.9)
POTASSIUM SERPL-SCNC: 4 MMOL/L (ref 3.5–5.1)
RBC # BLD AUTO: 3.95 M/UL (ref 4.6–6.2)
SODIUM SERPL-SCNC: 136 MMOL/L (ref 136–145)
WBC # BLD AUTO: 7.03 K/UL (ref 3.9–12.7)

## 2024-09-06 PROCEDURE — 80048 BASIC METABOLIC PNL TOTAL CA: CPT | Performed by: STUDENT IN AN ORGANIZED HEALTH CARE EDUCATION/TRAINING PROGRAM

## 2024-09-06 PROCEDURE — 25000003 PHARM REV CODE 250

## 2024-09-06 PROCEDURE — 11000001 HC ACUTE MED/SURG PRIVATE ROOM

## 2024-09-06 PROCEDURE — 85025 COMPLETE CBC W/AUTO DIFF WBC: CPT | Performed by: STUDENT IN AN ORGANIZED HEALTH CARE EDUCATION/TRAINING PROGRAM

## 2024-09-06 PROCEDURE — 84100 ASSAY OF PHOSPHORUS: CPT | Performed by: STUDENT IN AN ORGANIZED HEALTH CARE EDUCATION/TRAINING PROGRAM

## 2024-09-06 PROCEDURE — 83735 ASSAY OF MAGNESIUM: CPT | Performed by: STUDENT IN AN ORGANIZED HEALTH CARE EDUCATION/TRAINING PROGRAM

## 2024-09-06 PROCEDURE — 63600175 PHARM REV CODE 636 W HCPCS: Performed by: STUDENT IN AN ORGANIZED HEALTH CARE EDUCATION/TRAINING PROGRAM

## 2024-09-06 PROCEDURE — 36415 COLL VENOUS BLD VENIPUNCTURE: CPT | Performed by: STUDENT IN AN ORGANIZED HEALTH CARE EDUCATION/TRAINING PROGRAM

## 2024-09-06 RX ORDER — OXYCODONE HYDROCHLORIDE 5 MG/1
5 TABLET ORAL EVERY 4 HOURS PRN
Qty: 15 TABLET | Refills: 0 | Status: SHIPPED | OUTPATIENT
Start: 2024-09-06

## 2024-09-06 RX ORDER — CAMPHOR 0.45 %
GEL (GRAM) TOPICAL 3 TIMES DAILY PRN
Qty: 28 G | Refills: 0 | Status: SHIPPED | OUTPATIENT
Start: 2024-09-06

## 2024-09-06 RX ORDER — METHOCARBAMOL 750 MG/1
750 TABLET, FILM COATED ORAL EVERY 8 HOURS
Status: DISCONTINUED | OUTPATIENT
Start: 2024-09-06 | End: 2024-09-07 | Stop reason: HOSPADM

## 2024-09-06 RX ORDER — POLYETHYLENE GLYCOL 3350 17 G/17G
17 POWDER, FOR SOLUTION ORAL DAILY
Status: DISCONTINUED | OUTPATIENT
Start: 2024-09-06 | End: 2024-09-07 | Stop reason: HOSPADM

## 2024-09-06 RX ORDER — POLYETHYLENE GLYCOL 3350 17 G/17G
17 POWDER, FOR SOLUTION ORAL DAILY
Qty: 238 G | Refills: 0 | Status: SHIPPED | OUTPATIENT
Start: 2024-09-06 | End: 2024-09-21

## 2024-09-06 RX ORDER — SENNOSIDES 8.6 MG/1
8.6 TABLET ORAL DAILY PRN
Status: DISCONTINUED | OUTPATIENT
Start: 2024-09-06 | End: 2024-09-07 | Stop reason: HOSPADM

## 2024-09-06 RX ADMIN — ACETAMINOPHEN 1000 MG: 500 TABLET ORAL at 05:09

## 2024-09-06 RX ADMIN — SODIUM PHOSPHATE, MONOBASIC, MONOHYDRATE AND SODIUM PHOSPHATE, DIBASIC, ANHYDROUS 15 MMOL: 142; 276 INJECTION, SOLUTION INTRAVENOUS at 08:09

## 2024-09-06 RX ADMIN — CELECOXIB 200 MG: 200 CAPSULE ORAL at 08:09

## 2024-09-06 RX ADMIN — PREGABALIN 150 MG: 150 CAPSULE ORAL at 08:09

## 2024-09-06 RX ADMIN — METHOCARBAMOL 750 MG: 750 TABLET ORAL at 01:09

## 2024-09-06 RX ADMIN — ACETAMINOPHEN 1000 MG: 500 TABLET ORAL at 01:09

## 2024-09-06 RX ADMIN — HEPARIN SODIUM 5000 UNITS: 5000 INJECTION INTRAVENOUS; SUBCUTANEOUS at 05:09

## 2024-09-06 RX ADMIN — METHOCARBAMOL 750 MG: 750 TABLET ORAL at 05:09

## 2024-09-06 RX ADMIN — METHOCARBAMOL 750 MG: 750 TABLET ORAL at 09:09

## 2024-09-06 RX ADMIN — HEPARIN SODIUM 5000 UNITS: 5000 INJECTION INTRAVENOUS; SUBCUTANEOUS at 09:09

## 2024-09-06 RX ADMIN — HEPARIN SODIUM 5000 UNITS: 5000 INJECTION INTRAVENOUS; SUBCUTANEOUS at 01:09

## 2024-09-06 RX ADMIN — ACETAMINOPHEN 1000 MG: 500 TABLET ORAL at 09:09

## 2024-09-06 NOTE — PLAN OF CARE
[facility]  Discharge Reassessment    Primary Care Provider: Glen Andrade MD    Expected Discharge Date: 9/8/2024    Reassessment (most recent)       Discharge Reassessment - 09/06/24 1312          Discharge Reassessment    Assessment Type Discharge Planning Reassessment     Did the patient's condition or plan change since previous assessment? No     Discharge Plan discussed with: Patient     Communicated PAULA with patient/caregiver No     Discharge Plan A Home with family                     Patient to d/c home to care of family once medically stable. No Needs noted.    Daksha Sykes RNCM  Case Management  [facility]  [phone]

## 2024-09-06 NOTE — ANESTHESIA POST-OP PAIN MANAGEMENT
Acute Pain Service Progress Note    Arturo Salguero III is a 28 y.o., male, [id number].    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       Subjective:     General appearance of alert, oriented, no complaints   Pain with rest: 3    Faces   Pain with movement: 6    Faces   Side Effects    1. Pruritis No    2. Nausea No    3. Motor Blockade No, 0=Ability to raise lower extremities off bed    4. Sedation No, 1=awake and alert    Objective:     Catheter site clean, dry, intact      Vitals   Vitals:    09/06/24 0442   BP: 119/68   Pulse: 65   Resp:    Temp:         Labs    Admission on 09/03/2024   Component Date Value Ref Range Status    Group & Rh 09/03/2024 O POS   Final    Indirect Kayla 09/03/2024 NEG   Final    UNIT NUMBER 09/03/2024 [id number]   Preliminary    Product Code 09/03/2024 [id number]   Preliminary    DISPENSE STATUS 09/03/2024 CROSSMATCHED   Preliminary    CODING SYSTEM 09/03/2024 [id number]   Preliminary    Unit Blood Type Code 09/03/2024 5100   Preliminary    Unit Blood Type 09/03/2024 O POS   Preliminary    Unit Expiration 09/03/2024 202409242359   Preliminary    CROSSMATCH INTERPRETATION 09/03/2024 Compatible   Preliminary    UNIT NUMBER 09/03/2024 [id number]   Preliminary    Product Code 09/03/2024 [id number]   Preliminary    DISPENSE STATUS 09/03/2024 CROSSMATCHED   Preliminary    CODING SYSTEM 09/03/2024 [id number]   Preliminary    Unit Blood Type Code 09/03/2024 5100   Preliminary    Unit Blood Type 09/03/2024 O POS   Preliminary    Unit Expiration 09/03/2024 202409242359   Preliminary    CROSSMATCH INTERPRETATION 09/03/2024 Compatible    Preliminary    UNIT NUMBER 09/03/2024 [id number]   Preliminary    Product Code 09/03/2024 [id number]   Preliminary    DISPENSE STATUS 09/03/2024 CROSSMATCHED   Preliminary    CODING SYSTEM 09/03/2024 [id number]   Preliminary    Unit Blood Type Code 09/03/2024 5100   Preliminary    Unit Blood Type 09/03/2024 O POS   Preliminary    Unit Expiration 09/03/2024 202409242359   Preliminary    CROSSMATCH INTERPRETATION 09/03/2024 Compatible   Preliminary    UNIT NUMBER 09/03/2024 [id number]   Preliminary    Product Code 09/03/2024 [id number]   Preliminary    DISPENSE STATUS 09/03/2024 CROSSMATCHED   Preliminary    CODING SYSTEM 09/03/2024 [id number]   Preliminary    Unit Blood Type Code 09/03/2024 5100   Preliminary    Unit Blood Type 09/03/2024 O POS   Preliminary    Unit Expiration 09/03/2024 202409242359   Preliminary    CROSSMATCH INTERPRETATION 09/03/2024 Compatible   Preliminary    Sodium 09/03/2024 138  136 - 145 mmol/L Final    Potassium 09/03/2024 4.2  3.5 - 5.1 mmol/L Final    Chloride 09/03/2024 107  95 - 110 mmol/L Final    CO2 09/03/2024 16 (L)  23 - 29 mmol/L Final    Glucose 09/03/2024 140 (H)  70 - 110 mg/dL Final    BUN 09/03/2024 12  6 - 20 mg/dL Final    Creatinine 09/03/2024 1.2  0.5 - 1.4 mg/dL Final    Calcium 09/03/2024 8.5 (L)  8.7 - 10.5 mg/dL Final    Anion Gap 09/03/2024 15  8 - 16 mmol/L Final    eGFR 09/03/2024 >60.0  >60 mL/min/1.73 m^2 Final    Magnesium 09/03/2024 1.7  1.6 - 2.6 mg/dL Final    Phosphorus 09/03/2024 4.6 (H)  2.7 - 4.5 mg/dL Final    WBC 09/03/2024 14.48 (H)  3.90 - 12.70 K/uL Final    RBC 09/03/2024 4.91  4.60 - 6.20 M/uL Final    Hemoglobin 09/03/2024 13.6 (L)  14.0 - 18.0 g/dL Final    Hematocrit 09/03/2024 42.3  40.0 - 54.0 % Final    MCV 09/03/2024 86  82 - 98 fL Final    MCH 09/03/2024 27.7  27.0 - 31.0 pg Final    MCHC 09/03/2024 32.2  32.0 - 36.0 g/dL Final    RDW 09/03/2024 14.6 (H)  11.5 - 14.5 % Final    Platelets 09/03/2024 274  150 - 450 K/uL Final    MPV 09/03/2024  10.2  9.2 - 12.9 fL Final    Immature Granulocytes 09/03/2024 0.6 (H)  0.0 - 0.5 % Final    Gran # (ANC) 09/03/2024 12.5 (H)  1.8 - 7.7 K/uL Final    Immature Grans (Abs) 09/03/2024 0.08 (H)  0.00 - 0.04 K/uL Final    Lymph # 09/03/2024 0.9 (L)  1.0 - 4.8 K/uL Final    Mono # 09/03/2024 1.0  0.3 - 1.0 K/uL Final    Eos # 09/03/2024 0.0  0.0 - 0.5 K/uL Final    Baso # 09/03/2024 0.02  0.00 - 0.20 K/uL Final    nRBC 09/03/2024 0  0 /100 WBC Final    Gran % 09/03/2024 85.9 (H)  38.0 - 73.0 % Final    Lymph % 09/03/2024 6.4 (L)  18.0 - 48.0 % Final    Mono % 09/03/2024 7.0  4.0 - 15.0 % Final    Eosinophil % 09/03/2024 0.0  0.0 - 8.0 % Final    Basophil % 09/03/2024 0.1  0.0 - 1.9 % Final    Platelet Estimate 09/03/2024 Appears normal   Final    Differential Method 09/03/2024 Automated   Final    Sodium 09/04/2024 135 (L)  136 - 145 mmol/L Final    Potassium 09/04/2024 4.2  3.5 - 5.1 mmol/L Final    Chloride 09/04/2024 104  95 - 110 mmol/L Final    CO2 09/04/2024 19 (L)  23 - 29 mmol/L Final    Glucose 09/04/2024 118 (H)  70 - 110 mg/dL Final    BUN 09/04/2024 16  6 - 20 mg/dL Final    Creatinine 09/04/2024 1.1  0.5 - 1.4 mg/dL Final    Calcium 09/04/2024 8.5 (L)  8.7 - 10.5 mg/dL Final    Anion Gap 09/04/2024 12  8 - 16 mmol/L Final    eGFR 09/04/2024 >60.0  >60 mL/min/1.73 m^2 Final    WBC 09/04/2024 11.93  3.90 - 12.70 K/uL Final    RBC 09/04/2024 4.89  4.60 - 6.20 M/uL Final    Hemoglobin 09/04/2024 13.5 (L)  14.0 - 18.0 g/dL Final    Hematocrit 09/04/2024 44.1  40.0 - 54.0 % Final    MCV 09/04/2024 90  82 - 98 fL Final    MCH 09/04/2024 27.6  27.0 - 31.0 pg Final    MCHC 09/04/2024 30.6 (L)  32.0 - 36.0 g/dL Final    RDW 09/04/2024 15.0 (H)  11.5 - 14.5 % Final    Platelets 09/04/2024 277  150 - 450 K/uL Final    MPV 09/04/2024 10.4  9.2 - 12.9 fL Final    Immature Granulocytes 09/04/2024 0.4  0.0 - 0.5 % Final    Gran # (ANC) 09/04/2024 9.0 (H)  1.8 - 7.7 K/uL Final    Immature Grans (Abs) 09/04/2024 0.05 (H)   0.00 - 0.04 K/uL Final    Lymph # 09/04/2024 1.5  1.0 - 4.8 K/uL Final    Mono # 09/04/2024 1.4 (H)  0.3 - 1.0 K/uL Final    Eos # 09/04/2024 0.0  0.0 - 0.5 K/uL Final    Baso # 09/04/2024 0.01  0.00 - 0.20 K/uL Final    nRBC 09/04/2024 0  0 /100 WBC Final    Gran % 09/04/2024 75.3 (H)  38.0 - 73.0 % Final    Lymph % 09/04/2024 12.6 (L)  18.0 - 48.0 % Final    Mono % 09/04/2024 11.6  4.0 - 15.0 % Final    Eosinophil % 09/04/2024 0.0  0.0 - 8.0 % Final    Basophil % 09/04/2024 0.1  0.0 - 1.9 % Final    Differential Method 09/04/2024 Automated   Final    Magnesium 09/04/2024 1.8  1.6 - 2.6 mg/dL Final    Phosphorus 09/04/2024 3.6  2.7 - 4.5 mg/dL Final    POC PH 09/03/2024 7.357  7.35 - 7.45 Final    POC PCO2 09/03/2024 36.8  35 - 45 mmHg Final    POC PO2 09/03/2024 131 (H)  80 - 100 mmHg Final    POC HCO3 09/03/2024 20.7 (L)  24 - 28 mmol/L Final    POC BE 09/03/2024 -5 (L)  -2 to 2 mmol/L Final    POC SATURATED O2 09/03/2024 99  95 - 100 % Final    POC Glucose 09/03/2024 152 (H)  70 - 110 mg/dL Final    POC Sodium 09/03/2024 138  136 - 145 mmol/L Final    POC Potassium 09/03/2024 3.7  3.5 - 5.1 mmol/L Final    POC TCO2 09/03/2024 22 (L)  23 - 27 mmol/L Final    POC Ionized Calcium 09/03/2024 1.15  1.06 - 1.42 mmol/L Final    POC Hematocrit 09/03/2024 38  36 - 54 %PCV Final    Sample 09/03/2024 ARTERIAL   Final    Sodium 09/05/2024 137  136 - 145 mmol/L Final    Potassium 09/05/2024 3.8  3.5 - 5.1 mmol/L Final    Chloride 09/05/2024 105  95 - 110 mmol/L Final    CO2 09/05/2024 26  23 - 29 mmol/L Final    Glucose 09/05/2024 83  70 - 110 mg/dL Final    BUN 09/05/2024 14  6 - 20 mg/dL Final    Creatinine 09/05/2024 1.0  0.5 - 1.4 mg/dL Final    Calcium 09/05/2024 8.3 (L)  8.7 - 10.5 mg/dL Final    Anion Gap 09/05/2024 6 (L)  8 - 16 mmol/L Final    eGFR 09/05/2024 >60.0  >60 mL/min/1.73 m^2 Final    WBC 09/05/2024 8.48  3.90 - 12.70 K/uL Final    RBC 09/05/2024 4.14 (L)  4.60 - 6.20 M/uL Final    Hemoglobin 09/05/2024  11.8 (L)  14.0 - 18.0 g/dL Final    Hematocrit 09/05/2024 36.5 (L)  40.0 - 54.0 % Final    MCV 09/05/2024 88  82 - 98 fL Final    MCH 09/05/2024 28.5  27.0 - 31.0 pg Final    MCHC 09/05/2024 32.3  32.0 - 36.0 g/dL Final    RDW 09/05/2024 14.9 (H)  11.5 - 14.5 % Final    Platelets 09/05/2024 238  150 - 450 K/uL Final    MPV 09/05/2024 11.0  9.2 - 12.9 fL Final    Immature Granulocytes 09/05/2024 0.4  0.0 - 0.5 % Final    Gran # (ANC) 09/05/2024 5.1  1.8 - 7.7 K/uL Final    Immature Grans (Abs) 09/05/2024 0.03  0.00 - 0.04 K/uL Final    Lymph # 09/05/2024 2.3  1.0 - 4.8 K/uL Final    Mono # 09/05/2024 1.0  0.3 - 1.0 K/uL Final    Eos # 09/05/2024 0.0  0.0 - 0.5 K/uL Final    Baso # 09/05/2024 0.02  0.00 - 0.20 K/uL Final    nRBC 09/05/2024 0  0 /100 WBC Final    Gran % 09/05/2024 59.7  38.0 - 73.0 % Final    Lymph % 09/05/2024 27.5  18.0 - 48.0 % Final    Mono % 09/05/2024 12.0  4.0 - 15.0 % Final    Eosinophil % 09/05/2024 0.2  0.0 - 8.0 % Final    Basophil % 09/05/2024 0.2  0.0 - 1.9 % Final    Differential Method 09/05/2024 Automated   Final    Magnesium 09/05/2024 2.2  1.6 - 2.6 mg/dL Final    Phosphorus 09/05/2024 1.6 (L)  2.7 - 4.5 mg/dL Final    Sodium 09/06/2024 136  136 - 145 mmol/L Final    Potassium 09/06/2024 4.0  3.5 - 5.1 mmol/L Final    Chloride 09/06/2024 103  95 - 110 mmol/L Final    CO2 09/06/2024 28  23 - 29 mmol/L Final    Glucose 09/06/2024 86  70 - 110 mg/dL Final    BUN 09/06/2024 12  6 - 20 mg/dL Final    Creatinine 09/06/2024 0.9  0.5 - 1.4 mg/dL Final    Calcium 09/06/2024 8.9  8.7 - 10.5 mg/dL Final    Anion Gap 09/06/2024 5 (L)  8 - 16 mmol/L Final    eGFR 09/06/2024 >60.0  >60 mL/min/1.73 m^2 Final    WBC 09/06/2024 7.03  3.90 - 12.70 K/uL Final    RBC 09/06/2024 3.95 (L)  4.60 - 6.20 M/uL Final    Hemoglobin 09/06/2024 11.1 (L)  14.0 - 18.0 g/dL Final    Hematocrit 09/06/2024 35.1 (L)  40.0 - 54.0 % Final    MCV 09/06/2024 89  82 - 98 fL Final    MCH 09/06/2024 28.1  27.0 - 31.0 pg Final     MCHC 09/06/2024 31.6 (L)  32.0 - 36.0 g/dL Final    RDW 09/06/2024 14.4  11.5 - 14.5 % Final    Platelets 09/06/2024 233  150 - 450 K/uL Final    MPV 09/06/2024 10.4  9.2 - 12.9 fL Final    Immature Granulocytes 09/06/2024 0.4  0.0 - 0.5 % Final    Gran # (ANC) 09/06/2024 3.8  1.8 - 7.7 K/uL Final    Immature Grans (Abs) 09/06/2024 0.03  0.00 - 0.04 K/uL Final    Lymph # 09/06/2024 2.3  1.0 - 4.8 K/uL Final    Mono # 09/06/2024 0.9  0.3 - 1.0 K/uL Final    Eos # 09/06/2024 0.1  0.0 - 0.5 K/uL Final    Baso # 09/06/2024 0.02  0.00 - 0.20 K/uL Final    nRBC 09/06/2024 0  0 /100 WBC Final    Gran % 09/06/2024 53.8  38.0 - 73.0 % Final    Lymph % 09/06/2024 32.0  18.0 - 48.0 % Final    Mono % 09/06/2024 12.1  4.0 - 15.0 % Final    Eosinophil % 09/06/2024 1.4  0.0 - 8.0 % Final    Basophil % 09/06/2024 0.3  0.0 - 1.9 % Final    Differential Method 09/06/2024 Automated   Final    Magnesium 09/06/2024 2.0  1.6 - 2.6 mg/dL Final    Phosphorus 09/06/2024 2.6 (L)  2.7 - 4.5 mg/dL Final        Meds   Current Facility-Administered Medications   Medication Dose Route Frequency Provider Last Rate Last Admin    0.9%  NaCl infusion (for blood administration)   Intravenous Q24H PRN Gaston Rm MD        acetaminophen tablet 1,000 mg  1,000 mg Oral Q8H Blake Morales DO   1,000 mg at 09/06/24 0557    celecoxib capsule 200 mg  200 mg Oral Daily Blake Morales DO   200 mg at 09/05/24 0851    dextrose 10% bolus 125 mL 125 mL  12.5 g Intravenous PRN Vianney Mccormick MD        dextrose 10% bolus 250 mL 250 mL  25 g Intravenous PRN Vianney Mccormick MD        heparin (porcine) injection 5,000 Units  5,000 Units Subcutaneous Q8H Gaston Rm MD   5,000 Units at 09/06/24 0557    LIDOcaine (PF) 10 mg/ml (1%) injection 10 mg  1 mL Intradermal Once PRN Gaston Rm MD        melatonin tablet 6 mg  6 mg Oral Nightly PRN Gaston Rm MD   6 mg at 09/03/24 2347    methocarbamoL tablet 750 mg  750 mg Oral Q6H Blake Morales DO   750 mg  at 09/06/24 0557    ondansetron disintegrating tablet 8 mg  8 mg Oral Q8H PRN Gaston Rm MD   8 mg at 09/03/24 2113    ondansetron injection 4 mg  4 mg Intravenous Q12H PRN Blake Morales DO        oxyCODONE immediate release tablet 5 mg  5 mg Oral Q3H PRN Blake Morales DO   5 mg at 09/05/24 1500    And    oxyCODONE immediate release tablet Tab 10 mg  10 mg Oral Q3H PRN Blake Morales DO   10 mg at 09/05/24 0852    polyethylene glycol packet 17 g  17 g Oral Daily Carina Betancourt MD        pregabalin capsule 150 mg  150 mg Oral QHS Blake Morales DO   150 mg at 09/05/24 2104    promethazine tablet 25 mg  25 mg Oral Q6H PRN Gaston Rm MD   25 mg at 09/04/24 0428    sodium phosphate 15 mmol in D5W 250 mL IVPB  15 mmol Intravenous Once Adan Pang DO           Assessment:     Pain control adequate. Continues to mobilize. No prn opioid.     Plan:     Patient doing well, paused catheter this morning will pull catheter after working with PT   - Continue tylenol, celebrex, and lyrica (increased from 75 to 150 nightly)   - robaxin 750 every 8 hours   - PRN oxy 5, q 3 hours prn        Case discussed with staff, Dr. Chávez; final recommendations per attestation above.    Thank you for the consult and allowing us to participate in the care of this patient. We will sign off now. Please call Acute Pain Service/Anesthesia if you have any further questions or concerns.    Yesy Holland, PGY4  [facility] Resident

## 2024-09-06 NOTE — SUBJECTIVE & OBJECTIVE
Interval History: AFVSS. NAEO. Pain catheters paused this AM, pain controlled. Tolerating PO diet without nausea, vomiting. Small BM this AM. +flatus. PARVEEN output SS.     RLQ PARVEEN: 280/35    Objective:     Temp:  [96.8 °F (36 °C)-99.3 °F (37.4 °C)] 97.5 °F (36.4 °C)  Pulse:  [63-79] 65  Resp:  [16-18] 18  SpO2:  [96 %-99 %] 96 %  BP: ()/(60-79) 119/68     Body mass index is 29.56 kg/m².           Drains       Drain  Duration                  Closed/Suction Drain 09/03/24 1413 Right;Lateral RLQ Bulb 15 Fr. 2 days                     Physical Exam  Vitals reviewed.   Constitutional:       Appearance: Normal appearance.   Eyes:      Extraocular Movements: Extraocular movements intact.      Pupils: Pupils are equal, round, and reactive to light.   Pulmonary:      Effort: Pulmonary effort is normal.   Abdominal:      General: Abdomen is flat.      Palpations: Abdomen is soft.      Tenderness: There is no abdominal tenderness. There is no guarding or rebound.      Comments: Surgical incisions clean, dry, and intact   Abdomen soft, appropriately tender  JPSS   Skin:     General: Skin is warm.      Capillary Refill: Capillary refill takes less than 2 seconds.   Neurological:      General: No focal deficit present.      Mental Status: He is alert.   Psychiatric:         Mood and Affect: Mood normal.           Significant Labs:    BMP:  Recent Labs   Lab 09/04/24 0313 09/05/24 0458 09/06/24  0257   * 137 136   K 4.2 3.8 4.0    105 103   CO2 19* 26 28   BUN 16 14 12   CREATININE 1.1 1.0 0.9   CALCIUM 8.5* 8.3* 8.9       CBC:   Recent Labs   Lab 09/04/24 0313 09/05/24 0458 09/06/24  0257   WBC 11.93 8.48 7.03   HGB 13.5* 11.8* 11.1*   HCT 44.1 36.5* 35.1*    238 233       All pertinent labs results from the past 24 hours have been reviewed.    Significant Imaging:  All pertinent imaging results/findings from the past 24 hours have been reviewed.

## 2024-09-06 NOTE — PROGRESS NOTES
[facility]  Urology  Progress Note    Patient Name: Arturo Salguero III  MRN: [id number]  Admission Date: 9/3/2024  Hospital Length of Stay: 3 days  Code Status: Full Code   Attending Provider: Tapan Barrow MD   Primary Care Physician: Glen Andrade MD    Subjective:     HPI:  28M s/p RPLND on 9/3    Interval History: AFVSS. NAEO. Pain catheters paused this AM, pain controlled. Tolerating PO diet without nausea, vomiting. Small BM this AM. +flatus. PARVEEN output SS.     RLQ PARVEEN: 280/35    Objective:     Temp:  [96.8 °F (36 °C)-99.3 °F (37.4 °C)] 97.5 °F (36.4 °C)  Pulse:  [63-79] 65  Resp:  [16-18] 18  SpO2:  [96 %-99 %] 96 %  BP: ()/(60-79) 119/68     Body mass index is 29.56 kg/m².           Drains       Drain  Duration                  Closed/Suction Drain 09/03/24 1413 Right;Lateral RLQ Bulb 15 Fr. 2 days                     Physical Exam  Vitals reviewed.   Constitutional:       Appearance: Normal appearance.   Eyes:      Extraocular Movements: Extraocular movements intact.      Pupils: Pupils are equal, round, and reactive to light.   Pulmonary:      Effort: Pulmonary effort is normal.   Abdominal:      General: Abdomen is flat.      Palpations: Abdomen is soft.      Tenderness: There is no abdominal tenderness. There is no guarding or rebound.      Comments: Surgical incisions clean, dry, and intact   Abdomen soft, appropriately tender  JPSS   Skin:     General: Skin is warm.      Capillary Refill: Capillary refill takes less than 2 seconds.   Neurological:      General: No focal deficit present.      Mental Status: He is alert.   Psychiatric:         Mood and Affect: Mood normal.           Significant Labs:    BMP:  Recent Labs   Lab 09/04/24 0313 09/05/24 0458 09/06/24  0257   * 137 136   K 4.2 3.8 4.0    105 103   CO2 19* 26 28   BUN 16 14 12   CREATININE 1.1 1.0 0.9   CALCIUM 8.5* 8.3* 8.9       CBC:   Recent Labs   Lab 09/04/24  0313 09/05/24  0458 09/06/24  0257   WBC 11.93 8.48  7.03   HGB 13.5* 11.8* 11.1*   HCT 44.1 36.5* 35.1*    238 233       All pertinent labs results from the past 24 hours have been reviewed.    Significant Imaging:  All pertinent imaging results/findings from the past 24 hours have been reviewed.                  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    - Diet: Low fat diet   - Ambulate 5x daily   - Incentive spirometer usage, 10x per hour   - MMPC  - PNC Pain catheters in place, paused this AM, management per APS    - Hemoglobin stable  - Creatinine at baseline   - Will discontinue Entereg this AM and start Miralax.     Dispo: Continue inpatient care         VTE Risk Mitigation (From admission, onward)           Ordered     heparin (porcine) injection 5,000 Units  Every 8 hours         09/03/24 9860                    Adan Pang DO  Urology  [facility]

## 2024-09-06 NOTE — DISCHARGE INSTRUCTIONS
What to expect with your surgery  [facility]    After surgery  You may or may not have a drain that is shaped like a grenade and put to suction  This drain usually may or may not come out on Post op day 1. If you go home with a drain, the nurses will teach you how to record the output and you will come back 3-5 days after you leave to have the drain removed in clinic.  You will have a catheter after your surgery. It should come out the next day and you should be able to void on your own before you leave. If you are a male and on a BPH medicine, we will need to make sure you restart that the night of your surgery.  The night of surgery we expect and hope that you will:  Walk - walking helps get the bowels moving. Also after your surgery, you are at a risk for a deep venous thrombosis (which is a clot in the legs that can form by remaining inactive or still for extended periods of time) and this can travel to your lungs and make you feel short of breath. This is a very serious condition. Walking helps prevent a DVT from occurring.  Eat - Recommend eating a low fat diet. You do not have to eat a whole meal, but we want to make sure you can tolerate liquid and/or solid food without nausea and vomiting  Use your incentive spirometer - this is the breathing apparatus that helps you expand your lungs. If and when you have pain you will not want to take deep breaths. But if you dont take deep breaths, you are at risk for pneumonia. The incentive spirometer will help prevent this from occurring by expanding your lungs.  Symptoms you may experience immediately post-op:  Bloating and/or shoulder pain if you had a laparoscopic procedure - when we do this operation, we fill up your abdominal cavity with gas to better help us visualize the organs and allow our instruments to fit. After the surgery, not all the air can be removed and your body will eventually absorb this small amount of air. However this can make you feel  bloated. In addition, when you sit up, the air can sit right under a muscle (the diaphragm) which has connecting nerves to the shoulders, which could explain why you have shoulder pain.  Do not expect necessarily to have gas or to have a bowel movement - this goes along with the bloating, you may feel like you want to pass gas or have a bowel movement but you cant. This is normal and you will feel like this for a couple days. There are no pills to help with this. Small walks throughout the day should help with this.  Pain - your pain should be able to be controlled with medicines by mouth that we prescribe. It is important for you to tell us if you are on any pain medications at home before the surgery as you may need stronger pain meds while in the hospital.  You can go home when:  Pain is controlled with medicines by mouth  You are able to walk without difficulty or pain  You are tolerating a regulating diet  Your are voiding. If you cannot void we may have to replace a catheter and you will follow up 3-5 days after you leave to have a voiding trial. The nurses will teach you how to care for your catheter.  When you go home:  Activity  Continue to walk - small walks throughout the day are better than one long walk.   Do not lift anything greater than 8 pounds for 6 weeks - we want your abdominal wall muscles to heal.  Bowel Movements - Do not strain to have a bowel movement - the pain medicines will make you constipated. That is why we also ask you to take colace 2-3 x per day to help keep your bowels regular. If you are still having trouble, then you can also add Miralax once a day. Do not take any stool softeners if you are having diarrhea.  Drain - If you have a drain (not your catheter, but a separate drain) then record the output and bring it with you to your next appointment  Smoking - If you smoke, we encourage you STOP. Smoking interferes with the healing process and your prolong your healing with  continued smoking.  Driving - Do not drive while you are on pain meds or with your catheter in place.  Bathing - If you do not have a drain, you can shower 48 hours after your surgery. If you do have a drain, sponge bathe only until the drain is out.  Dressing - you can remove the dressings if there is no drainage or change them as needed if there is. The little sterile band-aid strips will fall off on their own in 10-14 days. If they have not fallen off then you can remove them yourself.  Restarting medicines -especially blood thinners (Aspirin, Plavix, Coumadin), Fish Oil. Discuss this with your physician prior to discharge.  When to return to the ER  Fever - If you have a fever >101.5, this could be due to a number of reasons such as infection of the urine or incision. If your catheter has been removed, you could possibly have a leak. It would be best to come to the ER so they can better evaluate you.  Severe pain - pain is expected, but severe or new onset of pain is not normal.   Inability to tolerate food or liquid with nausea and vomiting - it would be best to go to the ER for them to better evaluate you.

## 2024-09-06 NOTE — NURSING
Nurses Note -- 4 Eyes      9/6/2024   2:56 AM      Skin assessed during: Q Shift Change      [x] No Altered Skin Integrity Present    []Prevention Measures Documented      [] Yes- Altered Skin Integrity Present or Discovered   [] LDA Added if Not in Epic (Describe Wound)   [] New Altered Skin Integrity was Present on Admit and Documented in LDA   [] Wound Image Taken    Wound Care Consulted? No    Attending Nurse:  Nery Aquino RN/Staff Member:   Radha Valle

## 2024-09-06 NOTE — ADDENDUM NOTE
Addendum  created 09/06/24 1419 by Yesy Holland MD    Clinical Note Signed, Intraprocedure Event edited

## 2024-09-06 NOTE — ADDENDUM NOTE
Addendum  created 09/06/24 1048 by Monica Chávez MD    Charge Capture section accepted, Cosign clinical note with attestation

## 2024-09-06 NOTE — NURSING
Nurses Note -- 4 Eyes      9/6/2024   9:36 AM      Skin assessed during: Daily Assessment      [x] No Altered Skin Integrity Present    []Prevention Measures Documented      [] Yes- Altered Skin Integrity Present or Discovered   [] LDA Added if Not in Epic (Describe Wound)   [] New Altered Skin Integrity was Present on Admit and Documented in LDA   [] Wound Image Taken    Wound Care Consulted? No    Attending Nurse:  Guillermina Aquino RN/Staff Member:   LOYDA Barboza

## 2024-09-06 NOTE — ASSESSMENT & PLAN NOTE
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    - Diet: Low fat diet   - Ambulate 5x daily   - Incentive spirometer usage, 10x per hour   - MMPC  - PNC Pain catheters in place, paused this AM, management per APS    - Hemoglobin stable  - Creatinine at baseline   - Will discontinue Entereg this AM and start Miralax.     Dispo: Continue inpatient care

## 2024-09-06 NOTE — PLAN OF CARE
Problem: Adult Inpatient Plan of Care  Goal: Plan of Care Review  Outcome: Progressing     Problem: Adult Inpatient Plan of Care  Goal: Optimal Comfort and Wellbeing  Outcome: Progressing     Problem: Infection  Goal: Absence of Infection Signs and Symptoms  Outcome: Progressing     Problem: Wound  Goal: Optimal Functional Ability  Outcome: Progressing     Problem: Fall Injury Risk  Goal: Absence of Fall and Fall-Related Injury  Outcome: Progressing   Patient lying supine in bed playing video games. NAD noted. Family at the bedside. Safety measures in place.

## 2024-09-07 VITALS
BODY MASS INDEX: 29.49 KG/M2 | HEART RATE: 73 BPM | DIASTOLIC BLOOD PRESSURE: 72 MMHG | SYSTOLIC BLOOD PRESSURE: 130 MMHG | OXYGEN SATURATION: 97 % | RESPIRATION RATE: 14 BRPM | WEIGHT: 206 LBS | HEIGHT: 70 IN | TEMPERATURE: 98 F

## 2024-09-07 LAB
ANION GAP SERPL CALC-SCNC: 8 MMOL/L (ref 8–16)
BASOPHILS # BLD AUTO: 0.04 K/UL (ref 0–0.2)
BASOPHILS NFR BLD: 0.5 % (ref 0–1.9)
BLD PROD TYP BPU: NORMAL
BLOOD UNIT EXPIRATION DATE: NORMAL
BLOOD UNIT TYPE CODE: 5100
BLOOD UNIT TYPE: NORMAL
BUN SERPL-MCNC: 13 MG/DL (ref 6–20)
CALCIUM SERPL-MCNC: 9.3 MG/DL (ref 8.7–10.5)
CHLORIDE SERPL-SCNC: 104 MMOL/L (ref 95–110)
CO2 SERPL-SCNC: 25 MMOL/L (ref 23–29)
CODING SYSTEM: NORMAL
CREAT SERPL-MCNC: 1 MG/DL (ref 0.5–1.4)
CROSSMATCH INTERPRETATION: NORMAL
DIFFERENTIAL METHOD BLD: ABNORMAL
DISPENSE STATUS: NORMAL
EOSINOPHIL # BLD AUTO: 0.3 K/UL (ref 0–0.5)
EOSINOPHIL NFR BLD: 3.4 % (ref 0–8)
ERYTHROCYTE [DISTWIDTH] IN BLOOD BY AUTOMATED COUNT: 14.1 % (ref 11.5–14.5)
EST. GFR  (NO RACE VARIABLE): >60 ML/MIN/1.73 M^2
GLUCOSE SERPL-MCNC: 88 MG/DL (ref 70–110)
HCT VFR BLD AUTO: 39.7 % (ref 40–54)
HGB BLD-MCNC: 12.3 G/DL (ref 14–18)
IMM GRANULOCYTES # BLD AUTO: 0.07 K/UL (ref 0–0.04)
IMM GRANULOCYTES NFR BLD AUTO: 0.9 % (ref 0–0.5)
LYMPHOCYTES # BLD AUTO: 2.4 K/UL (ref 1–4.8)
LYMPHOCYTES NFR BLD: 32 % (ref 18–48)
MAGNESIUM SERPL-MCNC: 1.8 MG/DL (ref 1.6–2.6)
MCH RBC QN AUTO: 27.6 PG (ref 27–31)
MCHC RBC AUTO-ENTMCNC: 31 G/DL (ref 32–36)
MCV RBC AUTO: 89 FL (ref 82–98)
MONOCYTES # BLD AUTO: 0.8 K/UL (ref 0.3–1)
MONOCYTES NFR BLD: 10.3 % (ref 4–15)
NEUTROPHILS # BLD AUTO: 4 K/UL (ref 1.8–7.7)
NEUTROPHILS NFR BLD: 52.9 % (ref 38–73)
NRBC BLD-RTO: 0 /100 WBC
NUM UNITS TRANS PACKED RBC: NORMAL
PHOSPHATE SERPL-MCNC: 3.3 MG/DL (ref 2.7–4.5)
PLATELET # BLD AUTO: 277 K/UL (ref 150–450)
PMV BLD AUTO: 10.5 FL (ref 9.2–12.9)
POTASSIUM SERPL-SCNC: 4 MMOL/L (ref 3.5–5.1)
RBC # BLD AUTO: 4.45 M/UL (ref 4.6–6.2)
SODIUM SERPL-SCNC: 137 MMOL/L (ref 136–145)
WBC # BLD AUTO: 7.59 K/UL (ref 3.9–12.7)

## 2024-09-07 PROCEDURE — 83735 ASSAY OF MAGNESIUM: CPT | Performed by: STUDENT IN AN ORGANIZED HEALTH CARE EDUCATION/TRAINING PROGRAM

## 2024-09-07 PROCEDURE — 36415 COLL VENOUS BLD VENIPUNCTURE: CPT | Performed by: STUDENT IN AN ORGANIZED HEALTH CARE EDUCATION/TRAINING PROGRAM

## 2024-09-07 PROCEDURE — 80048 BASIC METABOLIC PNL TOTAL CA: CPT | Performed by: STUDENT IN AN ORGANIZED HEALTH CARE EDUCATION/TRAINING PROGRAM

## 2024-09-07 PROCEDURE — 85025 COMPLETE CBC W/AUTO DIFF WBC: CPT | Performed by: STUDENT IN AN ORGANIZED HEALTH CARE EDUCATION/TRAINING PROGRAM

## 2024-09-07 PROCEDURE — 84100 ASSAY OF PHOSPHORUS: CPT | Performed by: STUDENT IN AN ORGANIZED HEALTH CARE EDUCATION/TRAINING PROGRAM

## 2024-09-07 PROCEDURE — 25000003 PHARM REV CODE 250

## 2024-09-07 PROCEDURE — 63600175 PHARM REV CODE 636 W HCPCS: Performed by: STUDENT IN AN ORGANIZED HEALTH CARE EDUCATION/TRAINING PROGRAM

## 2024-09-07 RX ADMIN — METHOCARBAMOL 750 MG: 750 TABLET ORAL at 05:09

## 2024-09-07 RX ADMIN — HEPARIN SODIUM 5000 UNITS: 5000 INJECTION INTRAVENOUS; SUBCUTANEOUS at 05:09

## 2024-09-07 RX ADMIN — ACETAMINOPHEN 1000 MG: 500 TABLET ORAL at 05:09

## 2024-09-07 NOTE — SUBJECTIVE & OBJECTIVE
Interval History: NAOE. Pain controlled after PNCs removed yesterday  BM yesterday, passing gas.   Labs stable this am   Says he feels ready to go home this morning       Objective:     Temp:  [97.9 °F (36.6 °C)-98.1 °F (36.7 °C)] 97.9 °F (36.6 °C)  Pulse:  [54-77] 61  Resp:  [18] 18  SpO2:  [98 %-99 %] 98 %  BP: (124-151)/(64-74) 128/68     Body mass index is 29.56 kg/m².           Drains       Drain  Duration                  Closed/Suction Drain 09/03/24 1413 Right;Lateral RLQ Bulb 15 Fr. 3 days                     Physical Exam  Vitals reviewed.   Constitutional:       General: He is not in acute distress.     Appearance: Normal appearance.   HENT:      Head: Normocephalic.   Eyes:      Extraocular Movements: Extraocular movements intact.      Pupils: Pupils are equal, round, and reactive to light.   Cardiovascular:      Rate and Rhythm: Normal rate.   Pulmonary:      Effort: Pulmonary effort is normal.   Abdominal:      General: Abdomen is flat. There is no distension.      Palpations: Abdomen is soft.      Tenderness: There is no abdominal tenderness. There is no guarding or rebound.      Comments: Surgical incisions clean, dry, and intact   Abdomen soft, appropriately tender  JPSS   Musculoskeletal:         General: Normal range of motion.   Skin:     General: Skin is warm and dry.      Capillary Refill: Capillary refill takes less than 2 seconds.   Neurological:      General: No focal deficit present.      Mental Status: He is alert.      Coordination: Coordination normal.   Psychiatric:         Mood and Affect: Mood normal.         Behavior: Behavior normal.           Significant Labs:    BMP:  Recent Labs   Lab 09/05/24 0458 09/06/24 0257 09/07/24  0539    136 137   K 3.8 4.0 4.0    103 104   CO2 26 28 25   BUN 14 12 13   CREATININE 1.0 0.9 1.0   CALCIUM 8.3* 8.9 9.3       CBC:   Recent Labs   Lab 09/05/24 0458 09/06/24 0257 09/07/24  0539   WBC 8.48 7.03 7.59   HGB 11.8* 11.1* 12.3*   HCT  36.5* 35.1* 39.7*    233 277       All pertinent labs results from the past 24 hours have been reviewed.    Significant Imaging:  All pertinent imaging results/findings from the past 24 hours have been reviewed.

## 2024-09-07 NOTE — DISCHARGE SUMMARY
[facility]  Urology  Discharge Summary      Patient Name: Arturo Salguero III  MRN: [id number]  Admission Date: 9/3/2024  Hospital Length of Stay: 4 days  Discharge Date and Time: No discharge date for patient encounter.  Attending Physician: DODIE EVANS MD    Discharging Provider: DODIE EVANS MD  Primary Care Physician: Glen Andrade MD    HPI:   28M s/p RPLND on 9/3     Procedure(s) (LRB):  LYMPHADENECTOMY, RETROPERITONEUM (N/A)  LYMPHADENECTOMY, PELVIS (Right)     Indwelling Lines/Drains at time of discharge:   Lines/Drains/Airways       Drain  Duration                  Closed/Suction Drain 09/03/24 1413 Right;Lateral RLQ Bulb 15 Fr. 3 days              Epidural Line  Duration                  Perineural Analgesia/Anesthesia Assessment (Motor Function-Bromage) 09/03/24 0642 4 days                    Hospital Course (synopsis of major diagnoses, care, treatment, and services provided during the course of the hospital stay):  The patient presented with the above history and underwent above procedure. He tolerated the procedure well. Please see the operative note for further procedure details. Vitals remained stable throughout the post operative period. Physical exam was appropriate. The patient was able to void without difficulty, and tolerate a regular diet prior to discharge. His drain was removed. Patient was deemed suitable for discharge on No discharge date for patient encounter.       Medications and instructions as below.  For more thorough information, please refer to the hospital record and operative report.    Consults: None    Significant Diagnostic Studies:     Pending Diagnostic Studies:       Procedure Component Value Units Date/Time    Specimen to Pathology, Surgery Urology [[id number]] Collected: 09/03/24 1423    Order Status: Sent Lab Status: In process Updated: 09/03/24 8384    Specimen: Tissue             Final Active Diagnoses:    Diagnosis Date Noted POA    PRINCIPAL PROBLEM:   Malignant neoplasm of descended right testis [C62.11] 05/05/2023 Yes      Problems Resolved During this Admission:       Discharged Condition: good    Disposition: Home or Self Care    Follow Up: in clinic    Patient Instructions:      CBC Without Differential   Standing Status: Future Number of Occurrences: 1 Standing Exp. Date: 09/23/25     Comprehensive Metabolic Panel   Standing Status: Future Number of Occurrences: 1 Standing Exp. Date: 09/23/25     BASIC METABOLIC PANEL   Standing Status: Future Standing Exp. Date: 12/05/25     Order Specific Question Answer Comments   Send normal result to authorizing provider's In Basket if patient is active on MyChart: Yes      CBC W/ AUTO DIFFERENTIAL   Standing Status: Future Standing Exp. Date: 12/05/25     Diet Adult Regular     Diet Adult Regular   Order Comments: Please continue low fat diet upon discharge     Lifting restrictions     Notify your health care provider if you experience any of the following:  temperature >100.4     Notify your health care provider if you experience any of the following:  persistent nausea and vomiting or diarrhea     Notify your health care provider if you experience any of the following:  severe uncontrolled pain     Notify your health care provider if you experience any of the following:  redness, tenderness, or signs of infection (pain, swelling, redness, odor or green/yellow discharge around incision site)     Notify your health care provider if you experience any of the following:  difficulty breathing or increased cough     Notify your health care provider if you experience any of the following:  severe persistent headache     Notify your health care provider if you experience any of the following:  worsening rash     Notify your health care provider if you experience any of the following:  persistent dizziness, light-headedness, or visual disturbances     Notify your health care provider if you experience any of the following:   increased confusion or weakness     Notify your health care provider if you experience any of the following:     Lifting restrictions     Notify your health care provider if you experience any of the following:  temperature >100.4     Notify your health care provider if you experience any of the following:  persistent nausea and vomiting or diarrhea     Notify your health care provider if you experience any of the following:  severe uncontrolled pain     Notify your health care provider if you experience any of the following:  redness, tenderness, or signs of infection (pain, swelling, redness, odor or green/yellow discharge around incision site)     Notify your health care provider if you experience any of the following:  difficulty breathing or increased cough     Notify your health care provider if you experience any of the following:  severe persistent headache     Notify your health care provider if you experience any of the following:  worsening rash     Notify your health care provider if you experience any of the following:  persistent dizziness, light-headedness, or visual disturbances     Notify your health care provider if you experience any of the following:  increased confusion or weakness     Notify your health care provider if you experience any of the following:     Type & Screen   Standing Status: Future Number of Occurrences: 1 Standing Exp. Date: 09/23/25     Activity as tolerated     Activity as tolerated     Medications:  Reconciled Home Medications:      Medication List        START taking these medications      BENADRYL EXTRA STRENGTH cream  Generic drug: diphenhydrAMINE-zinc acetate 2-0.1%  Apply topically 3 (three) times daily as needed for Itching.     oxyCODONE 5 MG immediate release tablet  Commonly known as: ROXICODONE  Take 1 tablet (5 mg total) by mouth every 4 (four) hours as needed for Pain.     polyethylene glycol 17 gram/dose powder  Commonly known as: GLYCOLAX  Use to cap to  measure 17g, mix with liquid, and take by mouth once daily for 14 days.            ASK your doctor about these medications      ALPRAZolam 0.25 MG tablet  Commonly known as: XANAX  Take 1 tablet (0.25 mg total) by mouth 3 (three) times daily as needed for Anxiety (nausea).     dexAMETHasone 4 MG Tab  Commonly known as: DECADRON  Take 1 tablet (4 mg total) by mouth every 12 (twelve) hours as needed (take every 12 hours for 3 days following the chemotherapy with cisplatin)     doxycycline 100 MG tablet  Commonly known as: VIBRA-TABS  Take 1 tablet (100 mg total) by mouth 2 (two) times daily.     * ELIQUIS DVT-PE TREAT 30D START 5 mg (74 tabs) Dspk  Generic drug: apixaban  For the first 7 days take two 5 mg tablets twice daily.  After 7 days take one 5 mg tablet twice daily.     * ELIQUIS 5 mg Tab  Generic drug: apixaban  Take 1 tablet (5 mg total) by mouth 2 (two) times daily.     LIDOcaine-prilocaine cream  Commonly known as: EMLA  Apply topically as needed (apply to site 45-60 minutes prior to port access).     OLANZapine 5 MG tablet  Commonly known as: ZyPREXA  Take 1 tablet by mouth every night x 7 days starting day 1 of each chemo cycle or as otherwise stated     promethazine 25 MG tablet  Commonly known as: PHENERGAN  Take 1 tablet (25 mg total) by mouth every 6 (six) hours as needed for Nausea. (For breakthrough nausea if zofran is not working)           * This list has 2 medication(s) that are the same as other medications prescribed for you. Read the directions carefully, and ask your doctor or other care provider to review them with you.                  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        Please clarify/confirm the pathology findings:   Pathology findings  noted above are ruled in/confirmed as diagnoses

## 2024-09-07 NOTE — NURSING
Nurses Note -- 4 Eyes      9/6/2024   7:32 PM      Skin assessed during: Q Shift Change      [x] No Altered Skin Integrity Present    []Prevention Measures Documented      [] Yes- Altered Skin Integrity Present or Discovered   [] LDA Added if Not in Epic (Describe Wound)   [] New Altered Skin Integrity was Present on Admit and Documented in LDA   [] Wound Image Taken    Wound Care Consulted? No    Attending Nurse:  LOLA Martinez    Second RN/Staff Member:   LOLA Ahumada

## 2024-09-07 NOTE — PLAN OF CARE
Case Management Final Discharge Note      Discharge Disposition: home    New DME ordered / company name: none    Relevant SDOH / Transition of Care Barriers:  none    Primary Caretaker and contact information: self    Scheduled followup appointment: urology will call patient to schedule    Referrals placed: none    Transportation: patient Judy will transport patient home.         Patient and family educated on discharge services and updated on DC plan. Bedside RN notified, patient clear to discharge from Case Management Perspective.      [facility]  Discharge Final Note    Primary Care Provider: Glen Andrade MD    Expected Discharge Date: 9/7/2024    Final Discharge Note (most recent)       Final Note - 09/07/24 0840          Final Note    Assessment Type Final Discharge Note     Anticipated Discharge Disposition Home or Self Care     Hospital Resources/Appts/Education Provided Provided patient/caregiver with written discharge plan information        Post-Acute Status    Discharge Delays None known at this time                     Important Message from Medicare

## 2024-09-07 NOTE — ASSESSMENT & PLAN NOTE
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    - Diet: Low fat diet   - Ambulate 5x daily   - Incentive spirometer usage, 10x per hour   - MMPC  - Hemoglobin stable  - Creatinine at baseline     Dispo: Discharge this morning after drain is removed

## 2024-09-10 ENCOUNTER — PATIENT OUTREACH (OUTPATIENT)
Dept: ADMINISTRATIVE | Facility: CLINIC | Age: 29
End: 2024-09-10
Payer: COMMERCIAL

## 2024-09-10 NOTE — PROGRESS NOTES
C3 nurse attempted to contact Arturo Salguero III  for a TCC post hospital discharge follow up call. No answer. Left voicemail with callback information. The patient does not have a scheduled HOSFU appointment. Message sent to PCP staff for assistance with scheduling visit with patient.

## 2024-09-10 NOTE — TELEPHONE ENCOUNTER
Attempted to call patient to schedule hospital follow up. Patient didn't answer, left message to call office back.

## 2024-09-12 LAB
FINAL PATHOLOGIC DIAGNOSIS: NORMAL
GROSS: NORMAL
Lab: NORMAL

## 2024-09-23 ENCOUNTER — LAB VISIT (OUTPATIENT)
Dept: LAB | Facility: HOSPITAL | Age: 29
End: 2024-09-23
Payer: COMMERCIAL

## 2024-09-23 ENCOUNTER — OFFICE VISIT (OUTPATIENT)
Dept: UROLOGY | Facility: CLINIC | Age: 29
End: 2024-09-23
Payer: COMMERCIAL

## 2024-09-23 DIAGNOSIS — C62.90 MALIGNANT NEOPLASM OF TESTICLE, UNSPECIFIED LATERALITY, UNSPECIFIED WHETHER DESCENDED OR UNDESCENDED: Primary | ICD-10-CM

## 2024-09-23 DIAGNOSIS — C62.11 MALIGNANT NEOPLASM OF DESCENDED RIGHT TESTIS: ICD-10-CM

## 2024-09-23 LAB
ANION GAP SERPL CALC-SCNC: 7 MMOL/L (ref 8–16)
BASOPHILS # BLD AUTO: 0.04 K/UL (ref 0–0.2)
BASOPHILS NFR BLD: 0.7 % (ref 0–1.9)
BUN SERPL-MCNC: 7 MG/DL (ref 6–20)
CALCIUM SERPL-MCNC: 9.5 MG/DL (ref 8.7–10.5)
CHLORIDE SERPL-SCNC: 105 MMOL/L (ref 95–110)
CO2 SERPL-SCNC: 27 MMOL/L (ref 23–29)
CREAT SERPL-MCNC: 1.1 MG/DL (ref 0.5–1.4)
DIFFERENTIAL METHOD BLD: ABNORMAL
EOSINOPHIL # BLD AUTO: 0.3 K/UL (ref 0–0.5)
EOSINOPHIL NFR BLD: 5.9 % (ref 0–8)
ERYTHROCYTE [DISTWIDTH] IN BLOOD BY AUTOMATED COUNT: 14.3 % (ref 11.5–14.5)
EST. GFR  (NO RACE VARIABLE): >60 ML/MIN/1.73 M^2
GLUCOSE SERPL-MCNC: 97 MG/DL (ref 70–110)
HCT VFR BLD AUTO: 39.3 % (ref 40–54)
HGB BLD-MCNC: 12.4 G/DL (ref 14–18)
IMM GRANULOCYTES # BLD AUTO: 0.02 K/UL (ref 0–0.04)
IMM GRANULOCYTES NFR BLD AUTO: 0.4 % (ref 0–0.5)
LYMPHOCYTES # BLD AUTO: 1.9 K/UL (ref 1–4.8)
LYMPHOCYTES NFR BLD: 33.8 % (ref 18–48)
MCH RBC QN AUTO: 27.9 PG (ref 27–31)
MCHC RBC AUTO-ENTMCNC: 31.6 G/DL (ref 32–36)
MCV RBC AUTO: 89 FL (ref 82–98)
MONOCYTES # BLD AUTO: 0.5 K/UL (ref 0.3–1)
MONOCYTES NFR BLD: 9.5 % (ref 4–15)
NEUTROPHILS # BLD AUTO: 2.8 K/UL (ref 1.8–7.7)
NEUTROPHILS NFR BLD: 49.7 % (ref 38–73)
NRBC BLD-RTO: 0 /100 WBC
PLATELET # BLD AUTO: 476 K/UL (ref 150–450)
PMV BLD AUTO: 9.5 FL (ref 9.2–12.9)
POTASSIUM SERPL-SCNC: 4.2 MMOL/L (ref 3.5–5.1)
RBC # BLD AUTO: 4.44 M/UL (ref 4.6–6.2)
SODIUM SERPL-SCNC: 139 MMOL/L (ref 136–145)
WBC # BLD AUTO: 5.59 K/UL (ref 3.9–12.7)

## 2024-09-23 PROCEDURE — 80048 BASIC METABOLIC PNL TOTAL CA: CPT

## 2024-09-23 PROCEDURE — 36415 COLL VENOUS BLD VENIPUNCTURE: CPT

## 2024-09-23 PROCEDURE — 99024 POSTOP FOLLOW-UP VISIT: CPT | Mod: S$GLB,,, | Performed by: UROLOGY

## 2024-09-23 PROCEDURE — 85025 COMPLETE CBC W/AUTO DIFF WBC: CPT

## 2024-09-23 NOTE — PROGRESS NOTES
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     Physical Exam            Physical Exam    General: No acute distress. Nontoxic appearing.  HENT: Normocephalic. Atraumatic.  Respiratory: Normal respiratory effort. No conversational dyspnea. No audible wheezing.  Abdomen: No obvious distension.  Skin: No visible abnormalities.  Extremities: No edema upper extremities. No edema lower extremities.  Neurological: Alert and oriented x3. Normal speech.  Psychiatric: Normal mood. Normal affect. No evidence of SI.   -surgical scars present with midline incision, well healed    LABS:    CBC:  Lab Results   Component Value Date    WBC 7.59 09/07/2024    HGB 12.3 (L) 09/07/2024    HCT 39.7 (L) 09/07/2024    MCV 89 09/07/2024     09/07/2024         BMP:  Lab Results   Component Value Date     09/07/2024    K 4.0 09/07/2024     09/07/2024    CO2 25 09/07/2024    BUN 13 09/07/2024    CREATININE 1.0 09/07/2024    CALCIUM 9.3 09/07/2024    ANIONGAP 8 09/07/2024    EGFRNORACEVR >60.0 09/07/2024       ASSESSMENT/PLAN:     Assessment & Plan             Assessment: 28-year-old male status post RPLND for testis cancer, pathology consistent with mature cystic teratoma    Plan:   -follow up in 2 months with tumor markers and virtual visit, we will aim for his 1st CT abdomen and pelvis and chest x-ray six-months postop.          This encounter was dictated and transcribed using [employer] and [employer], please excuse any typographical or grammatical errors.

## 2024-10-02 ENCOUNTER — PATIENT MESSAGE (OUTPATIENT)
Dept: UROLOGY | Facility: CLINIC | Age: 29
End: 2024-10-02
Payer: COMMERCIAL

## 2024-10-08 ENCOUNTER — PATIENT MESSAGE (OUTPATIENT)
Dept: UROLOGY | Facility: CLINIC | Age: 29
End: 2024-10-08
Payer: COMMERCIAL

## 2024-11-11 ENCOUNTER — TELEPHONE (OUTPATIENT)
Dept: UROLOGY | Facility: CLINIC | Age: 29
End: 2024-11-11
Payer: COMMERCIAL

## 2024-11-11 NOTE — TELEPHONE ENCOUNTER
GOPI to let patient know that Dr. Barrow will be in surgery on 12/11 and I rescheduled his follow up for 12/18 at 8:15am.

## 2024-11-12 ENCOUNTER — OFFICE VISIT (OUTPATIENT)
Dept: HEMATOLOGY/ONCOLOGY | Facility: CLINIC | Age: 29
End: 2024-11-12
Payer: COMMERCIAL

## 2024-11-12 ENCOUNTER — LAB VISIT (OUTPATIENT)
Dept: LAB | Facility: HOSPITAL | Age: 29
End: 2024-11-12
Attending: INTERNAL MEDICINE
Payer: COMMERCIAL

## 2024-11-12 VITALS
TEMPERATURE: 98 F | HEART RATE: 64 BPM | BODY MASS INDEX: 27.79 KG/M2 | RESPIRATION RATE: 17 BRPM | HEIGHT: 70 IN | SYSTOLIC BLOOD PRESSURE: 119 MMHG | WEIGHT: 194.13 LBS | OXYGEN SATURATION: 98 % | DIASTOLIC BLOOD PRESSURE: 68 MMHG

## 2024-11-12 DIAGNOSIS — C62.11 MALIGNANT NEOPLASM OF DESCENDED RIGHT TESTIS: ICD-10-CM

## 2024-11-12 DIAGNOSIS — C62.11 MALIGNANT NEOPLASM OF DESCENDED RIGHT TESTIS: Primary | ICD-10-CM

## 2024-11-12 LAB
AFP SERPL-MCNC: 3.3 NG/ML (ref 0–8.4)
ALBUMIN SERPL BCP-MCNC: 4 G/DL (ref 3.5–5.2)
ALP SERPL-CCNC: 67 U/L (ref 40–150)
ALT SERPL W/O P-5'-P-CCNC: 13 U/L (ref 10–44)
ANION GAP SERPL CALC-SCNC: 7 MMOL/L (ref 8–16)
AST SERPL-CCNC: 18 U/L (ref 10–40)
BASOPHILS # BLD AUTO: 0.03 K/UL (ref 0–0.2)
BASOPHILS NFR BLD: 0.5 % (ref 0–1.9)
BILIRUB SERPL-MCNC: 0.3 MG/DL (ref 0.1–1)
BUN SERPL-MCNC: 13 MG/DL (ref 6–20)
CALCIUM SERPL-MCNC: 8.9 MG/DL (ref 8.7–10.5)
CHLORIDE SERPL-SCNC: 106 MMOL/L (ref 95–110)
CO2 SERPL-SCNC: 26 MMOL/L (ref 23–29)
CREAT SERPL-MCNC: 1.1 MG/DL (ref 0.5–1.4)
DIFFERENTIAL METHOD BLD: ABNORMAL
EOSINOPHIL # BLD AUTO: 0.1 K/UL (ref 0–0.5)
EOSINOPHIL NFR BLD: 1.9 % (ref 0–8)
ERYTHROCYTE [DISTWIDTH] IN BLOOD BY AUTOMATED COUNT: 14.6 % (ref 11.5–14.5)
EST. GFR  (NO RACE VARIABLE): >60 ML/MIN/1.73 M^2
GLUCOSE SERPL-MCNC: 94 MG/DL (ref 70–110)
HCT VFR BLD AUTO: 41.4 % (ref 40–54)
HGB BLD-MCNC: 13.2 G/DL (ref 14–18)
IMM GRANULOCYTES # BLD AUTO: 0.02 K/UL (ref 0–0.04)
IMM GRANULOCYTES NFR BLD AUTO: 0.3 % (ref 0–0.5)
LDH SERPL L TO P-CCNC: 144 U/L (ref 110–260)
LYMPHOCYTES # BLD AUTO: 1.9 K/UL (ref 1–4.8)
LYMPHOCYTES NFR BLD: 33.4 % (ref 18–48)
MCH RBC QN AUTO: 27.7 PG (ref 27–31)
MCHC RBC AUTO-ENTMCNC: 31.9 G/DL (ref 32–36)
MCV RBC AUTO: 87 FL (ref 82–98)
MONOCYTES # BLD AUTO: 0.4 K/UL (ref 0.3–1)
MONOCYTES NFR BLD: 7.6 % (ref 4–15)
NEUTROPHILS # BLD AUTO: 3.3 K/UL (ref 1.8–7.7)
NEUTROPHILS NFR BLD: 56.3 % (ref 38–73)
NRBC BLD-RTO: 0 /100 WBC
PLATELET # BLD AUTO: 275 K/UL (ref 150–450)
PMV BLD AUTO: 10.3 FL (ref 9.2–12.9)
POTASSIUM SERPL-SCNC: 3.9 MMOL/L (ref 3.5–5.1)
PROT SERPL-MCNC: 6.8 G/DL (ref 6–8.4)
RBC # BLD AUTO: 4.77 M/UL (ref 4.6–6.2)
SODIUM SERPL-SCNC: 139 MMOL/L (ref 136–145)
WBC # BLD AUTO: 5.8 K/UL (ref 3.9–12.7)

## 2024-11-12 PROCEDURE — 82105 ALPHA-FETOPROTEIN SERUM: CPT | Performed by: INTERNAL MEDICINE

## 2024-11-12 PROCEDURE — 3074F SYST BP LT 130 MM HG: CPT | Mod: CPTII,S$GLB,, | Performed by: INTERNAL MEDICINE

## 2024-11-12 PROCEDURE — 84702 CHORIONIC GONADOTROPIN TEST: CPT | Performed by: INTERNAL MEDICINE

## 2024-11-12 PROCEDURE — 3008F BODY MASS INDEX DOCD: CPT | Mod: CPTII,S$GLB,, | Performed by: INTERNAL MEDICINE

## 2024-11-12 PROCEDURE — 85025 COMPLETE CBC W/AUTO DIFF WBC: CPT | Performed by: INTERNAL MEDICINE

## 2024-11-12 PROCEDURE — 83615 LACTATE (LD) (LDH) ENZYME: CPT | Performed by: INTERNAL MEDICINE

## 2024-11-12 PROCEDURE — 99999 PR PBB SHADOW E&M-EST. PATIENT-LVL IV: CPT | Mod: PBBFAC,,, | Performed by: INTERNAL MEDICINE

## 2024-11-12 PROCEDURE — 3078F DIAST BP <80 MM HG: CPT | Mod: CPTII,S$GLB,, | Performed by: INTERNAL MEDICINE

## 2024-11-12 PROCEDURE — 80053 COMPREHEN METABOLIC PANEL: CPT | Performed by: INTERNAL MEDICINE

## 2024-11-12 PROCEDURE — 99214 OFFICE O/P EST MOD 30 MIN: CPT | Mod: S$GLB,,, | Performed by: INTERNAL MEDICINE

## 2024-11-12 PROCEDURE — G2211 COMPLEX E/M VISIT ADD ON: HCPCS | Mod: S$GLB,,, | Performed by: INTERNAL MEDICINE

## 2024-11-12 PROCEDURE — 1159F MED LIST DOCD IN RCRD: CPT | Mod: CPTII,S$GLB,, | Performed by: INTERNAL MEDICINE

## 2024-11-12 PROCEDURE — 1160F RVW MEDS BY RX/DR IN RCRD: CPT | Mod: CPTII,S$GLB,, | Performed by: INTERNAL MEDICINE

## 2024-11-12 NOTE — PROGRESS NOTES
Subjective     Patient ID: Arturo Salguero III is a 29 y.o. male.    Chief Complaint: Malignant neoplasm of descended right testis    HPI    Presents for follow up surveillance    - 9/3/2024 RPLND with Dr. Barrow  Pathology:  1. RIGHT GONADAL VEIN AND SPERMATIC CORD, EXCISION:  Benign spermatic cord and vascular tissue.    Negative for malignancy.      2. RIGHT PARACAVAL LYMPH NODES, EXCISION:  Thirteen lymph nodes negative for tumor (0/13).    3. 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  Twenty lymph nodes negative for tumor (0/20).      Comment: Two dominant nodules present are multiloculated cystic lesions consisting of exclusively mature teratomatous elements including cystic spaces lined by respiratory and intestinal type epithelium, epidermal inclusion cysts with keratin material  and smooth muscle tissue.  There is occasional nodular lymphoid tissue at the periphery.  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    The current specimen consists only of mature teratoma.  No immature teratoma or other germ cell elements are identified. 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  Prognosis is reportedly excellent after full excision.    4. RIGHT COMMON ILIAC LYMPH NODES, EXCISION:  Four lymph nodes negative for tumor (0/4).    5. PARA-AORTIC LYMPH NODES, EXCISION:  Twelve lymph nodes negative for tumor (0/12).       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  *Flow: Normal arterial and venous flow  *Epididymis: Normal.  *Hydrocele: None.  *Varicocele: None.  Left Testicle:  *Size: 7.7 x 5.2 x 5.6 cm  *Appearance: Markedly heterogeneous echotexture.  *Flow: Normal arterial and venous flow  *Epididymis: Normal.  *Hydrocele: None.  *Varicocele: None.  Other findings: None.  Impression:  Bilateral testicular enlargement with markedly heterogeneous echotexture, concerning for infiltrative process such as lymphoma.  Correlation with prior history is recommended as sequela from bilateral remote orchitis may give a similar appearance.  Consultation with urology is recommended.  This report was flagged in Epic as abnormal.     - 3/21/2023 CT C/A/P:  FINDINGS:  BASE OF NECK: No significant abnormality.  Thyroid gland unremarkable.  THORACIC SOFT TISSUES: No axillary adenopathy.  AORTA: Thoracic aorta maintains normal caliber.  Left-sided aortic arch with normal three vessel branching pattern.  No calcific atherosclerosis of the thoracic aorta.  HEART: Normal size with physiologic pericardial fluid.  PULMONARY VASCULATURE: Unremarkable.  EMILY/MEDIASTINUM: No pathologic alexandra enlargement.  AIRWAYS: Trachea and proximal airways remain patent.  LUNGS/PLEURA: Lungs are symmetrically well expanded.  No focal consolidation, mass, pneumothorax, or pleural fluid.  LIVER: Normal in size and contour.  No focal hepatic lesion.  GALLBLADDER: No calcified gallstones.  BILE DUCTS: No evidence of dilated ducts.  PANCREAS: No mass or peripancreatic fat stranding.  SPLEEN: No  splenomegaly.  ADRENALS: Unremarkable.  KIDNEYS/URETERS: Normal in size and location with normal enhancement.  No hydronephrosis or nephrolithiasis. No ureteral dilatation.  BLADDER: No evidence of wall thickening.  REPRODUCTIVE ORGANS: Heterogeneous 6.5 x 6.2 cm mass-like enlargement of right testicle, correlate with 03/20/2023 ultrasound.  STOMACH/DUODENUM: Unremarkable.  BOWEL/MESENTERY: Small bowel is normal in caliber with no evidence of obstruction. No evidence of inflammation or wall thickening.  Colon demonstrates no focal wall thickening.  Appendix is visualized and appears normal.  PERITONEUM: No intraperitoneal free air or fluid.  LYMPH NODES: Enlarged 1.1 cm pericaval node (axial 3:99).  No pelvic or inguinal lymphadenopathy.  VASCULATURE: No significant calcific atherosclerosis.  Portal venous system is patent.  ABDOMINAL WALL:  Small fat containing umbilical hernia.  BONES: No acute fracture. No suspicious osseous lesions.  Impression:  1. Enlarged 1.1 cm pericaval lymph node.  No additional enlarged lymph nodes throughout the abdomen.  No pelvic or inguinal lymphadenopathy.  2. Heterogeneous 6.5 x 6.2 cm mass-like enlargement of right testicle, correlate with 03/20/2023 ultrasound.  3. Other findings above.  This report was flagged in Epic as abnormal.     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   - See [facility] synoptic report below.   CASE SUMMARY: (TESTIS: Radical Orchiectomy)   Standard(s): AJCC-UICC 8   SPECIMEN   Specimen Laterality: Right.   Tumor Focality: Unifocal.   Tumor Size: Greatest dimension of main tumor mass in Centimeters (cm): 6.7 cm.   Histologic Type: Mixed germ cell tumor.   Embryonal carcinoma (specify percentage): 50 %   Yolk sac tumor, postpubertal type (specify percentage): 40 %   Choriocarcinoma (specify percentage): 10 %   Tumor Extent: Invades rete testis.   Lymphovascular Invasion: Present.    Margin Status: All margins negative for tumor.   Regional Lymph Node Status: Not applicable (no regional lymph nodes submitted or found).   Distant Site: Not applicable.   PATHOLOGIC STAGE CLASSIFICATION (pTNM, AJCC 8th Edition): pT2 pN not assigned (no nodes submitted or found).      - 5/8/2023 PET scan:  FINDINGS:  Quality of the study: Adequate.  In the head and neck, there are no hypermetabolic lesions worrisome for malignancy. There are no hypermetabolic mucosal lesions, and there are no pathologically enlarged or hypermetabolic lymph nodes.  In the chest, there are no hypermetabolic lesions worrisome for malignancy.  There are no concerning pulmonary nodules or masses, and there are no pathologically enlarged or hypermetabolic lymph nodes.  Bilateral gynecomastia  In the abdomen and pelvis, there is physiologic tracer distribution within the abdominal organs and excretion into the genitourinary system.  There are multiple prominent hypermetabolic retroperitoneal lymph nodes.  For example, 0.9 cm retrocaval nodes with SUV max 5.1 (axial fused image 165).  1.3 cm aortocaval lymph node at the level of the umbilicus with SUV max 4.9 (axial fused image 173).  0.8 cm right common iliac node with an SUV of 5.0 on image 195.  Postsurgical changes of bilateral orchiectomy with mild postsurgical uptake in the scrotum.  Circumferential bladder wall thickening, likely due to nondistention.  There is a questionable hypermetabolic focus in the posterior right hepatic lobe with a maximum SUV of 4.2 but no correlate on noncontrast CT image 119.  In the bones, there are no definite hypermetabolic lesions worrisome for malignancy.  There is a hypermetabolic focus with an SUV of 3.8 in the T8 vertebral body without CT correlate on image 107.  In the extremities, there are no hypermetabolic lesions worrisome for malignancy.  Impression:  1.  In this patient with testicular cancer status post bilateral orchiectomy, there are  multiple prominent hypermetabolic pelvic and retroperitoneal lymph nodes.  These findings are concerning for metastatic disease.  2.  Questionable hypermetabolic foci in liver and bone may indicate additional metastases.  If management would change, recommend further evaluation with MRI.     - 6/1/2023 MRI Abdomen:  FINDINGS:  Inferior Thorax: Bilateral gynecomastia.  Liver: Normal size and background parenchymal signal.  No suspicious lesion.  Hepatic and portal veins are patent.  Gallbladder: Unremarkable.  Bile Ducts: No dilatation.  Pancreas: No mass or ductal dilatation.  Spleen: Unremarkable.  Adrenals: Unremarkable.  Kidneys/Ureters: No mass or hydroureteronephrosis.  GI Tract/Mesentery: No evidence of bowel obstruction or inflammation.  Peritoneal Space: No ascites.  Retroperitoneum: Few prominent distal retroperitoneal lymph nodes, similar to prior PET-CT.  Distal aortocaval lymph node measures 1.3 cm (series [id number], image 75).  Abdominal wall: Unremarkable.  Vasculature: No aneurysm.  Bones: No suspicious marrow replacing lesion.  Impression:  1. No suspicious hepatic lesion.  2. Few prominent distal retroperitoneal lymph nodes, similar to prior PET-CT.  3. Additional findings as above.     6/14/2023 MRI T spine:  FINDINGS:  Alignment: Normal.  Vertebrae: Normal marrow signal. No fracture.  No enhancing osseous lesions.  Discs: Disc heights are maintained.  No evidence for discitis.  Cord: Normal morphology and signal.  Enhancement: No abnormal enhancement.  Degenerative findings: No spinal canal stenosis or neural foraminal narrowing at any level.  Paraspinal muscles & soft tissues: Unremarkable.  Impression:  1. Normal examination.  No evidence for metastatic disease.     - s/p 3 cycles of BEP completed 7/24/2023     - scans post  - 10/12/2023 PET:  FINDINGS:  Quality of the study: Adequate.  In the head and neck, there are no hypermetabolic lesions worrisome for malignancy. There are no hypermetabolic  mucosal lesions, and there are no pathologically enlarged or hypermetabolic lymph nodes.  In the chest, there are no hypermetabolic lesions worrisome for malignancy.  There are no concerning pulmonary nodules or masses, and there are no pathologically enlarged or hypermetabolic lymph nodes.  In the abdomen and pelvis, there is interval decreased tracer uptake of previous hypermetabolic retroperitoneal and right iliac chain lymph nodes, now with uptake similar to background.  For example: 1.3 cm right para-aortic lymph node with SUV max of 1.8 (series 3, image 168).  Previously measured 1.3 cm with SUV max of 4.9.  Previously described question hypermetabolic focus in the posterior right hepatic lobe is no longer visualized.  Postsurgical changes status post bilateral orchiectomy noting nonspecific tracer uptake in the left aspect of the scrotum, unchanged.  There is physiologic tracer distribution within the abdominal organs and excretion into the genitourinary system.  In the bones, there is a new focus of increased tracer uptake in the right aspect of the manubrium with SUV max of 4.3 on image 57.  No definite CT correlate.  No tracer avid osseous lesion elsewhere.  Previously described hypermetabolic focus in the T8 vertebral body is no longer visualized.  In the extremities, there are no hypermetabolic lesions worrisome for malignancy.  Impression:  Mixed interval changes in this patient with testicular cancer status post bilateral orchiectomy.  New tracer avid focus in the manubrium suspicious for osseous metastatic focus.  Decreased tracer uptake within retroperitoneal and pelvic lymph nodes, now with uptake similar to background.     - further work op of manubrium with MRI Chest on 10/16/2023:  FINDINGS:  Marrow signal within the osseous structures is normal.  Particular attention given to the area of increased radiotracer uptake involving the sternal aspect of the right sternoclavicular joint.  No abnormal  marrow signal or enhancement is seen in this location.  The soft tissue structures within the field of view have a benign appearance.  Impression:  No evidence of osseous metastatic disease involving the manubrium of the sternum     - 5/21/2024 Tumor markers wnl     - 5/21/2024 CT C/A/P:  FINDINGS:  Soft tissue structures at the base of the neck appear unremarkable.  The heart is not enlarged.  The thoracic aorta is normal in caliber without aneurysmal dilatation.  No hilar or mediastinal adenopathy is identified.  No axillary adenopathy is identified.  The lungs are clear.  No suspicious lung nodules are identified.  There is no evidence for pneumothorax or pleural effusions.  Bony structures appear intact.  There is no evidence for acute fracture or bone destruction.  The liver is normal in size and is homogeneous in density with no focal liver lesions identified.  The gallbladder is present and appears grossly unremarkable.  There is no evidence for intrahepatic or extrahepatic biliary dilatation.  The portal venous system is patent.  The spleen, stomach, and pancreas all appear grossly unremarkable.  The adrenal glands are not enlarged.  The kidneys are normal in size, position, and contour and are noted to concentrate contrast symmetrically.  The abdominal aorta tapers normally without aneurysmal dilatation.  Once again, enlarged low-density para-aortic lymph nodes are identified, 1 measuring 1.2 cm in short axis (image 87, series 3), previously 1.2 cm, and the other measuring 1.7 cm in short axis (image 94, series 3), previously 1.4 cm.  The appendix is present and appears unremarkable.  No dilated loops of bowel are evident.  There is circumferential bladder wall thickening present partially related to the decompression of the urinary bladder.  There is no evidence for pelvic or inguinal lymphadenopathy.  There is a small fat containing left inguinal hernia.  The patient has a testicular  prosthesis  Impression:  Enlarged low-density para-aortic lymph nodes are again identified.  There does appear to be interval enlargement of at least 1 of the lymph nodes when compared to the prior study.  No new enlarged lymph nodes identified.  Small fat containing left inguinal hernia.    - 7/5/2024 PET scan:  FINDINGS:  Quality of the study: Adequate.  In the head and neck, there are no hypermetabolic lesions worrisome for malignancy. There are no hypermetabolic mucosal lesions, and there are no pathologically enlarged or hypermetabolic lymph nodes.  In the chest, there are no hypermetabolic lesions worrisome for malignancy.  There are no concerning pulmonary nodules or masses, and there are no pathologically enlarged or hypermetabolic lymph nodes.  In the abdomen and pelvis, interval increased size of para-aortic lymph nodes when compared to previous PET-CT, similar size as compared to CT from 05/21/2024.  Nodes continue to demonstrate low level uptake similar to background.  Index right para-aortic lymph node measures 1.6 cm in short axis with background uptake, previously 1.3 cm on prior PET-CT with uptake similar to background.  Measures 1.7 cm on recent CT from 05/21/2024.  Similar postsurgical change status post bilateral orchiectomy.  there is physiologic tracer distribution within the abdominal organs and excretion into the genitourinary system.  In the bones, there are no hypermetabolic lesions worrisome for malignancy.  Previous hypermetabolic activity in the right aspect of the manubrium has resolved.  In the extremities, there are no hypermetabolic lesions worrisome for malignancy.  Impression:  Increased size of para-aortic lymph nodes as compared to previous PET-CT noting continued uptake similar to background.  Interval resolution of right manubrial hypermetabolic activity.  No suspicious hypermetabolic lesion on today's exam.    - 9/3/2024 RPLND with Dr. Barrow  Pathology:  1. RIGHT GONADAL VEIN  AND SPERMATIC CORD, EXCISION:  Benign spermatic cord and vascular tissue.    Negative for malignancy.      2. RIGHT PARACAVAL LYMPH NODES, EXCISION:  Thirteen lymph nodes negative for tumor (0/13).    3. 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  Twenty lymph nodes negative for tumor (0/20).      Comment: Two dominant nodules present are multiloculated cystic lesions consisting of exclusively mature teratomatous elements including cystic spaces lined by respiratory and intestinal type epithelium, epidermal inclusion cysts with keratin material  and smooth muscle tissue.  There is occasional nodular lymphoid tissue at the periphery.  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    The current specimen consists only of mature teratoma.  No immature teratoma or other germ cell elements are identified. 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  Prognosis is reportedly excellent after full excision.    4. RIGHT COMMON ILIAC LYMPH NODES, EXCISION:  Four lymph nodes negative for tumor (0/4).    5. PARA-AORTIC LYMPH NODES, EXCISION:  Twelve lymph nodes negative for tumor (0/12).      PMH:  None     FH:  Mother living, healthy   Father, living, healthy  1 brother- Type 1 DM  1 sister- healthy  No major health issues known extended family     SH:  Apartment work, maintenance A/C  Good support system  No EtOH (Rare social)  No tobacco      Review of Systems   Constitutional:  Negative for activity change, appetite change, chills, fatigue, fever and unexpected weight change.        See  Above   HENT:  Negative for dental problem, hearing loss, mouth sores, postnasal drip, rhinorrhea, sinus pressure/congestion, sore throat, tinnitus, trouble swallowing and voice change.    Eyes:  Negative for visual disturbance.   Respiratory:  Negative for cough, shortness of breath and wheezing.    Cardiovascular:  Negative for chest pain, palpitations and leg swelling.   Gastrointestinal:  Positive for nausea. Negative for abdominal distention, abdominal pain, blood in stool, constipation, diarrhea and vomiting.   Endocrine: Negative for cold intolerance and heat intolerance.   Genitourinary:  Negative for bladder incontinence, decreased urine volume, difficulty urinating, dysuria, frequency, hematuria and urgency.   Musculoskeletal:  Negative for arthralgias, back pain, gait problem, joint swelling, myalgias, neck pain and neck stiffness.   Integumentary:  Negative for color change, pallor, rash and wound.   Neurological:  Negative for dizziness, facial asymmetry, weakness, light-headedness, numbness, headaches and coordination difficulties.   Hematological:  Negative for adenopathy. Does not bruise/bleed easily.   Psychiatric/Behavioral:  Negative for agitation, behavioral problems, confusion, decreased concentration, dysphoric mood and sleep disturbance. The patient is not nervous/anxious.           Objective     Physical Exam  Vitals and nursing note reviewed.   Constitutional:       General: He is not in acute distress.     Appearance: Normal appearance. He is well-developed and normal weight. He is not ill-appearing.      Comments: Presents with mom and girlfriend  ECOG=0   HENT:      Head: Normocephalic and atraumatic.   Eyes:      General: No scleral icterus.     Extraocular Movements: Extraocular movements intact.      Conjunctiva/sclera: Conjunctivae normal.      Pupils: Pupils are equal, round, and reactive to light.   Neck:      Thyroid: No thyromegaly.      Trachea: No tracheal deviation.    Cardiovascular:      Rate and Rhythm: Normal rate and regular rhythm.      Heart sounds: Normal heart sounds. No murmur heard.     No friction rub. No gallop.   Pulmonary:      Effort: Pulmonary effort is normal. No respiratory distress.      Breath sounds: Normal breath sounds. No wheezing, rhonchi or rales.   Chest:      Chest wall: No tenderness.   Abdominal:      General: Bowel sounds are normal. There is no distension.      Palpations: Abdomen is soft. There is no mass.      Tenderness: There is no abdominal tenderness. There is no guarding or rebound.   Musculoskeletal:         General: No swelling or tenderness. Normal range of motion.      Cervical back: Normal range of motion and neck supple.      Right lower leg: No edema.      Left lower leg: No edema.   Lymphadenopathy:      Cervical: No cervical adenopathy.   Skin:     General: Skin is warm and dry.      Coloration: Skin is not jaundiced or pale.      Findings: No erythema, lesion or rash.   Neurological:      General: No focal deficit present.      Mental Status: He is alert and oriented to person, place, and time. Mental status is at baseline.      Sensory: No sensory deficit.      Motor: No weakness or abnormal muscle tone.      Coordination: Coordination normal.      Gait: Gait normal.      Deep Tendon Reflexes: Reflexes are normal and symmetric. Reflexes normal.   Psychiatric:         Mood and Affect: Mood normal.         Behavior: Behavior normal.         Thought Content: Thought content normal.         Judgment: Judgment normal.     Labs- reviewed     Assessment and Plan     1. Malignant neoplasm of descended right testis    Imaging at end of year  Return to [facility] surveillance post      Route Chart for Scheduling    Med Onc Chart Routing      Follow up with physician . CT C/A/P mid to late Dec and virtual next day   Follow up with GREG[facility]    Infusion scheduling note    Injection scheduling note    Labs    Imaging    Pharmacy appointment    Other  referrals            Treatment Plan Information   OP TESTICULAR BEP 5-DAY Q3W Zenobia Alston MD   Associated diagnosis: Malignant neoplasm of descended right testis Stage Unknown pT2, pNX, cM1, S1 noted on 5/5/2023   Line of treatment: First Line  Treatment Goal: Curative     Upcoming Treatment Dates - OP TESTICULAR BEP 5-DAY Q3W    No upcoming days in selected categories.

## 2024-11-13 LAB — B-HCG SERPL-ACNC: <0.6 IU/L

## 2024-11-27 ENCOUNTER — PATIENT MESSAGE (OUTPATIENT)
Dept: RESEARCH | Facility: HOSPITAL | Age: 29
End: 2024-11-27
Payer: COMMERCIAL

## 2024-12-10 ENCOUNTER — LAB VISIT (OUTPATIENT)
Dept: LAB | Facility: HOSPITAL | Age: 29
End: 2024-12-10
Attending: UROLOGY
Payer: COMMERCIAL

## 2024-12-10 DIAGNOSIS — C62.90 MALIGNANT NEOPLASM OF TESTICLE, UNSPECIFIED LATERALITY, UNSPECIFIED WHETHER DESCENDED OR UNDESCENDED: ICD-10-CM

## 2024-12-10 LAB
AFP SERPL-MCNC: 3.4 NG/ML (ref 0–8.4)
LDH SERPL L TO P-CCNC: 159 U/L (ref 110–260)

## 2024-12-10 PROCEDURE — 84702 CHORIONIC GONADOTROPIN TEST: CPT | Performed by: UROLOGY

## 2024-12-10 PROCEDURE — 82105 ALPHA-FETOPROTEIN SERUM: CPT | Performed by: UROLOGY

## 2024-12-10 PROCEDURE — 36415 COLL VENOUS BLD VENIPUNCTURE: CPT | Performed by: UROLOGY

## 2024-12-10 PROCEDURE — 83615 LACTATE (LD) (LDH) ENZYME: CPT | Performed by: UROLOGY

## 2024-12-11 LAB — B-HCG SERPL-ACNC: <0.6 IU/L

## 2024-12-18 ENCOUNTER — OFFICE VISIT (OUTPATIENT)
Dept: UROLOGY | Facility: CLINIC | Age: 29
End: 2024-12-18
Payer: COMMERCIAL

## 2024-12-18 DIAGNOSIS — C62.90 MALIGNANT NEOPLASM OF TESTICLE, UNSPECIFIED LATERALITY, UNSPECIFIED WHETHER DESCENDED OR UNDESCENDED: Primary | ICD-10-CM

## 2024-12-18 PROCEDURE — G2211 COMPLEX E/M VISIT ADD ON: HCPCS | Mod: 95,,, | Performed by: UROLOGY

## 2024-12-18 PROCEDURE — 99214 OFFICE O/P EST MOD 30 MIN: CPT | Mod: 95,,, | Performed by: UROLOGY

## 2024-12-30 ENCOUNTER — HOSPITAL ENCOUNTER (OUTPATIENT)
Dept: RADIOLOGY | Facility: HOSPITAL | Age: 29
Discharge: HOME | End: 2024-12-30
Attending: INTERNAL MEDICINE
Payer: COMMERCIAL

## 2024-12-30 DIAGNOSIS — C62.11 MALIGNANT NEOPLASM OF DESCENDED RIGHT TESTIS: ICD-10-CM

## 2024-12-30 PROCEDURE — 25500020 PHARM REV CODE 255: Performed by: INTERNAL MEDICINE

## 2024-12-30 PROCEDURE — A9698 NON-RAD CONTRAST MATERIALNOC: HCPCS | Performed by: INTERNAL MEDICINE

## 2024-12-30 PROCEDURE — 71260 CT THORAX DX C+: CPT | Mod: 26,,, | Performed by: STUDENT IN AN ORGANIZED HEALTH CARE EDUCATION/TRAINING PROGRAM

## 2024-12-30 PROCEDURE — 71260 CT THORAX DX C+: CPT | Mod: TC

## 2024-12-30 PROCEDURE — 74177 CT ABD & PELVIS W/CONTRAST: CPT | Mod: 26,,, | Performed by: STUDENT IN AN ORGANIZED HEALTH CARE EDUCATION/TRAINING PROGRAM

## 2024-12-30 RX ADMIN — BARIUM SULFATE 450 ML: 21 SUSPENSION ORAL at 02:12

## 2024-12-30 RX ADMIN — IOHEXOL 100 ML: 350 INJECTION, SOLUTION INTRAVENOUS at 02:12

## 2024-12-31 DIAGNOSIS — C62.11 MALIGNANT NEOPLASM OF DESCENDED RIGHT TESTIS: ICD-10-CM

## 2024-12-31 DIAGNOSIS — R22.2 LOCALIZED SWELLING, MASS AND LUMP, TRUNK: Primary | ICD-10-CM

## 2025-01-02 NOTE — PROGRESS NOTES
[facility]  Urologic Oncology    The patient location is:  [place], [place]  The chief complaint leading to consultation is:  Testis cancer    Visit type: audiovisual    Face to Face time with patient: 10  30 minutes of total time spent on the encounter, which includes face to face time and non-face to face time preparing to see the patient (eg, review of tests), Obtaining and/or reviewing separately obtained history, Documenting clinical information in the electronic or other health record, Independently interpreting results (not separately reported) and communicating results to the patient/family/caregiver, or Care coordination (not separately reported).     Each patient to whom he or she provides medical services by telemedicine is:  (1) informed of the relationship between the physician and patient and the respective role of any other health care provider with respect to management of the patient; and (2) notified that he or she may decline to receive medical services by telemedicine and may withdraw from such care at any time.    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  He is now status post post chemotherapy retroperitoneal lymphadenectomy with nerve-sparing on 09/03/2024.  He tolerated the procedure very well and has been doing well overall.    His pain  is well controlled in his back to his physical status and working well.  He had a CT chest abdomen and pelvis with IV contrast on 12/30/2024.  I reviewed these imaging studies personally and I have independently interpreted this, there was no evidence of recurrence    Despite nerve-sparing, he is not currently having antegrade ejaculation.  He did sperm bank before chemotherapy, in his unsure of when he and his fiancee we will consider conceiving.    Allergies:  Review of patient's allergies indicates:   Allergen Reactions    Amoxicillin      Rash        Medications per EMR:  (Not in a hospital admission)      Past Medical History:  Past Medical History:   Diagnosis Date    Deep vein thrombosis     Malignant neoplasm of descended right testis     VTE (venous thromboembolism)         Past Surgical History:  Past Surgical History:   Procedure Laterality Date    LYMPHADENECTOMY, PELVIS Right 9/3/2024    Procedure: LYMPHADENECTOMY, PELVIS;  Surgeon: Tapan Barrow MD;  Location: [facility] OR [address] [place] [address];  Service: Urology;  Laterality: Right;    ORCHIECTOMY, RADICAL, INGUINAL APPROACH Right 3/31/2023    Procedure: ORCHIECTOMY, RADICAL, INGUINAL APPROACH;  Surgeon: Perico Shah MD;  Location: [facility] OR [address] [place] [address];  Service: Urology;  Laterality: Right;  90 minutes    RETROPERITONEAL LYMPHADENECTOMY N/A 9/3/2024    Procedure: LYMPHADENECTOMY, RETROPERITONEUM;  Surgeon: Tapan Barrow MD;  Location: [facility] OR [address] [place] [address];  Service: Urology;  Laterality: N/A;        Family History:  Family History   Problem Relation Name Age of Onset    Hypertension Father      Diabetes Brother          Social History:  Social History     Tobacco Use    Smoking status: Never    Smokeless tobacco: Never   Substance Use Topics    Alcohol use: Yes     Comment: Once in a few months          OBJECTIVE:     There were no vitals filed for this visit.     Physical Exam    General: No acute distress. Nontoxic appearing.  HENT: Normocephalic.  Atraumatic.  Respiratory: Normal respiratory effort. No conversational dyspnea. No audible wheezing.  Abdomen: No obvious distension.  Skin: No visible abnormalities.  Extremities: No edema upper extremities. No edema lower extremities.  Neurological: Alert and oriented x3. Normal speech.  Psychiatric: Normal mood. Normal affect. No evidence of SI.         LABS:    CBC:  Lab Results   Component Value Date    WBC 5.80 11/12/2024    HGB 13.2 (L) 11/12/2024    HCT 41.4 11/12/2024    MCV 87 11/12/2024     11/12/2024         BMP:  Lab Results   Component Value Date     11/12/2024    K 3.9 11/12/2024     11/12/2024    CO2 26 11/12/2024    BUN 13 11/12/2024    CREATININE 1.1 11/12/2024    CALCIUM 8.9 11/12/2024    ANIONGAP 7 (L) 11/12/2024    EGFRNORACEVR >60.0 11/12/2024         ASSESSMENT/PLAN:     Assessment & Plan      Assessment: 29-year-old male status post post chemotherapy RPLND for testis cancer, pathology teratoma    Plan:     Testis cancer: Chronic, stable  -follow up tumor markers in 2 months  -follow up with chest x-ray, and CT of the abdomen and pelvis in 6 months, ordered        Anejaculation: Chronic, stable  -discussed with the patient if he is still not experiencing antegrade admission by her next follow up and he is wishing to get pregnant, we can perform a post intercourse urinalysis to see if he has any retrograde ejaculation versus true lack of ejaculation, can also refer to our infertility specialists at that time, of note he does have cryopreserved sperm from prior to chemotherapy.             - code applied: patient requires or will require a continuous, longitudinal, and active collaborative plan of care related to this patient's health condition, testis cancer --the management of which requires the direction of a practitioner with specialized clinical knowledge, skill, and expertise.       This encounter was dictated and transcribed using DeepScribe and [employer],  please excuse any typographical or grammatical errors.

## 2025-01-03 ENCOUNTER — PATIENT MESSAGE (OUTPATIENT)
Dept: UROLOGY | Facility: CLINIC | Age: 30
End: 2025-01-03
Payer: COMMERCIAL

## 2025-03-11 ENCOUNTER — LAB VISIT (OUTPATIENT)
Dept: LAB | Facility: HOSPITAL | Age: 30
End: 2025-03-11
Attending: INTERNAL MEDICINE
Payer: COMMERCIAL

## 2025-03-11 ENCOUNTER — OFFICE VISIT (OUTPATIENT)
Dept: HEMATOLOGY/ONCOLOGY | Facility: CLINIC | Age: 30
End: 2025-03-11
Payer: COMMERCIAL

## 2025-03-11 VITALS
BODY MASS INDEX: 28.39 KG/M2 | OXYGEN SATURATION: 100 % | HEART RATE: 55 BPM | TEMPERATURE: 97 F | RESPIRATION RATE: 17 BRPM | DIASTOLIC BLOOD PRESSURE: 62 MMHG | WEIGHT: 198.31 LBS | HEIGHT: 70 IN | SYSTOLIC BLOOD PRESSURE: 123 MMHG

## 2025-03-11 DIAGNOSIS — C62.11 MALIGNANT NEOPLASM OF DESCENDED RIGHT TESTIS: ICD-10-CM

## 2025-03-11 DIAGNOSIS — D64.81 ANEMIA DUE TO CHEMOTHERAPY: ICD-10-CM

## 2025-03-11 DIAGNOSIS — T45.1X5A ANEMIA DUE TO CHEMOTHERAPY: ICD-10-CM

## 2025-03-11 DIAGNOSIS — C62.11 MALIGNANT NEOPLASM OF DESCENDED RIGHT TESTIS: Primary | ICD-10-CM

## 2025-03-11 LAB
AFP SERPL-MCNC: 3.7 NG/ML (ref 0–8.4)
ALBUMIN SERPL BCP-MCNC: 4.3 G/DL (ref 3.5–5.2)
ALP SERPL-CCNC: 57 U/L (ref 40–150)
ALT SERPL W/O P-5'-P-CCNC: 16 U/L (ref 10–44)
ANION GAP SERPL CALC-SCNC: 5 MMOL/L (ref 8–16)
AST SERPL-CCNC: 26 U/L (ref 10–40)
BASOPHILS # BLD AUTO: 0.03 K/UL (ref 0–0.2)
BASOPHILS NFR BLD: 0.5 % (ref 0–1.9)
BILIRUB SERPL-MCNC: 0.4 MG/DL (ref 0.1–1)
BUN SERPL-MCNC: 14 MG/DL (ref 6–20)
CALCIUM SERPL-MCNC: 9.4 MG/DL (ref 8.7–10.5)
CHLORIDE SERPL-SCNC: 106 MMOL/L (ref 95–110)
CO2 SERPL-SCNC: 26 MMOL/L (ref 23–29)
CREAT SERPL-MCNC: 1.1 MG/DL (ref 0.5–1.4)
DIFFERENTIAL METHOD BLD: ABNORMAL
EOSINOPHIL # BLD AUTO: 0.1 K/UL (ref 0–0.5)
EOSINOPHIL NFR BLD: 1.4 % (ref 0–8)
ERYTHROCYTE [DISTWIDTH] IN BLOOD BY AUTOMATED COUNT: 14.3 % (ref 11.5–14.5)
EST. GFR  (NO RACE VARIABLE): >60 ML/MIN/1.73 M^2
GLUCOSE SERPL-MCNC: 93 MG/DL (ref 70–110)
HCT VFR BLD AUTO: 41.4 % (ref 40–54)
HGB BLD-MCNC: 13.2 G/DL (ref 14–18)
IMM GRANULOCYTES # BLD AUTO: 0.03 K/UL (ref 0–0.04)
IMM GRANULOCYTES NFR BLD AUTO: 0.5 % (ref 0–0.5)
LDH SERPL L TO P-CCNC: 174 U/L (ref 110–260)
LYMPHOCYTES # BLD AUTO: 2.2 K/UL (ref 1–4.8)
LYMPHOCYTES NFR BLD: 36.5 % (ref 18–48)
MCH RBC QN AUTO: 28.1 PG (ref 27–31)
MCHC RBC AUTO-ENTMCNC: 31.9 G/DL (ref 32–36)
MCV RBC AUTO: 88 FL (ref 82–98)
MONOCYTES # BLD AUTO: 0.5 K/UL (ref 0.3–1)
MONOCYTES NFR BLD: 7.9 % (ref 4–15)
NEUTROPHILS # BLD AUTO: 3.2 K/UL (ref 1.8–7.7)
NEUTROPHILS NFR BLD: 53.2 % (ref 38–73)
NRBC BLD-RTO: 0 /100 WBC
PLATELET # BLD AUTO: 272 K/UL (ref 150–450)
PMV BLD AUTO: 10.4 FL (ref 9.2–12.9)
POTASSIUM SERPL-SCNC: 4.3 MMOL/L (ref 3.5–5.1)
PROT SERPL-MCNC: 7.3 G/DL (ref 6–8.4)
RBC # BLD AUTO: 4.7 M/UL (ref 4.6–6.2)
SODIUM SERPL-SCNC: 137 MMOL/L (ref 136–145)
WBC # BLD AUTO: 5.92 K/UL (ref 3.9–12.7)

## 2025-03-11 PROCEDURE — 85025 COMPLETE CBC W/AUTO DIFF WBC: CPT | Performed by: INTERNAL MEDICINE

## 2025-03-11 PROCEDURE — 3008F BODY MASS INDEX DOCD: CPT | Mod: CPTII,S$GLB,, | Performed by: INTERNAL MEDICINE

## 2025-03-11 PROCEDURE — 3074F SYST BP LT 130 MM HG: CPT | Mod: CPTII,S$GLB,, | Performed by: INTERNAL MEDICINE

## 2025-03-11 PROCEDURE — 82105 ALPHA-FETOPROTEIN SERUM: CPT | Performed by: INTERNAL MEDICINE

## 2025-03-11 PROCEDURE — 99214 OFFICE O/P EST MOD 30 MIN: CPT | Mod: S$GLB,,, | Performed by: INTERNAL MEDICINE

## 2025-03-11 PROCEDURE — G2211 COMPLEX E/M VISIT ADD ON: HCPCS | Mod: S$GLB,,, | Performed by: INTERNAL MEDICINE

## 2025-03-11 PROCEDURE — 83615 LACTATE (LD) (LDH) ENZYME: CPT | Performed by: INTERNAL MEDICINE

## 2025-03-11 PROCEDURE — 36415 COLL VENOUS BLD VENIPUNCTURE: CPT | Performed by: INTERNAL MEDICINE

## 2025-03-11 PROCEDURE — 3078F DIAST BP <80 MM HG: CPT | Mod: CPTII,S$GLB,, | Performed by: INTERNAL MEDICINE

## 2025-03-11 PROCEDURE — 84702 CHORIONIC GONADOTROPIN TEST: CPT | Performed by: INTERNAL MEDICINE

## 2025-03-11 PROCEDURE — 99999 PR PBB SHADOW E&M-EST. PATIENT-LVL IV: CPT | Mod: PBBFAC,,, | Performed by: INTERNAL MEDICINE

## 2025-03-11 PROCEDURE — 1159F MED LIST DOCD IN RCRD: CPT | Mod: CPTII,S$GLB,, | Performed by: INTERNAL MEDICINE

## 2025-03-11 PROCEDURE — 80053 COMPREHEN METABOLIC PANEL: CPT | Performed by: INTERNAL MEDICINE

## 2025-03-11 NOTE — PROGRESS NOTES
Subjective     Patient ID: Arturo Salguero III is a 29 y.o. male.    Chief Complaint: Malignant neoplasm of descended right testis    HPI    Presents for follow up surveillance    Interval history:  Presents today for follow up.  Doing well with no complaints today.    Works for [employer].      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  *Flow: Normal arterial and venous flow  *Epididymis: Normal.  *Hydrocele: None.  *Varicocele: None.  Left Testicle:  *Size: 7.7 x 5.2 x 5.6 cm  *Appearance: Markedly heterogeneous echotexture.  *Flow: Normal arterial and venous flow  *Epididymis: Normal.  *Hydrocele: None.  *Varicocele: None.  Other findings: None.  Impression:  Bilateral testicular enlargement with markedly heterogeneous echotexture, concerning for infiltrative process such as lymphoma.  Correlation with prior history is recommended as sequela from bilateral remote orchitis may give a similar appearance.  Consultation with urology is recommended.  This report was flagged in Epic as abnormal.     - 3/21/2023 CT C/A/P:  FINDINGS:  BASE OF NECK: No significant abnormality.  Thyroid gland unremarkable.  THORACIC SOFT TISSUES: No axillary adenopathy.  AORTA: Thoracic aorta maintains normal caliber.  Left-sided aortic arch with normal three vessel branching pattern.  No calcific atherosclerosis of the thoracic aorta.  HEART: Normal size with physiologic pericardial  fluid.  PULMONARY VASCULATURE: Unremarkable.  EMILY/MEDIASTINUM: No pathologic alexandra enlargement.  AIRWAYS: Trachea and proximal airways remain patent.  LUNGS/PLEURA: Lungs are symmetrically well expanded.  No focal consolidation, mass, pneumothorax, or pleural fluid.  LIVER: Normal in size and contour.  No focal hepatic lesion.  GALLBLADDER: No calcified gallstones.  BILE DUCTS: No evidence of dilated ducts.  PANCREAS: No mass or peripancreatic fat stranding.  SPLEEN: No splenomegaly.  ADRENALS: Unremarkable.  KIDNEYS/URETERS: Normal in size and location with normal enhancement.  No hydronephrosis or nephrolithiasis. No ureteral dilatation.  BLADDER: No evidence of wall thickening.  REPRODUCTIVE ORGANS: Heterogeneous 6.5 x 6.2 cm mass-like enlargement of right testicle, correlate with 03/20/2023 ultrasound.  STOMACH/DUODENUM: Unremarkable.  BOWEL/MESENTERY: Small bowel is normal in caliber with no evidence of obstruction. No evidence of inflammation or wall thickening.  Colon demonstrates no focal wall thickening.  Appendix is visualized and appears normal.  PERITONEUM: No intraperitoneal free air or fluid.  LYMPH NODES: Enlarged 1.1 cm pericaval node (axial 3:99).  No pelvic or inguinal lymphadenopathy.  VASCULATURE: No significant calcific atherosclerosis.  Portal venous system is patent.  ABDOMINAL WALL:  Small fat containing umbilical hernia.  BONES: No acute fracture. No suspicious osseous lesions.  Impression:  1. Enlarged 1.1 cm pericaval lymph node.  No additional enlarged lymph nodes throughout the abdomen.  No pelvic or inguinal lymphadenopathy.  2. Heterogeneous 6.5 x 6.2 cm mass-like enlargement of right testicle, correlate with 03/20/2023 ultrasound.  3. Other findings above.  This report was flagged in Epic as abnormal.     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   - See [facility] synoptic report below.   CASE SUMMARY:  (TESTIS: Radical Orchiectomy)   Standard(s): AJCC-UICC 8   SPECIMEN   Specimen Laterality: Right.   Tumor Focality: Unifocal.   Tumor Size: Greatest dimension of main tumor mass in Centimeters (cm): 6.7 cm.   Histologic Type: Mixed germ cell tumor.   Embryonal carcinoma (specify percentage): 50 %   Yolk sac tumor, postpubertal type (specify percentage): 40 %   Choriocarcinoma (specify percentage): 10 %   Tumor Extent: Invades rete testis.   Lymphovascular Invasion: Present.   Margin Status: All margins negative for tumor.   Regional Lymph Node Status: Not applicable (no regional lymph nodes submitted or found).   Distant Site: Not applicable.   PATHOLOGIC STAGE CLASSIFICATION (pTNM, AJCC 8th Edition): pT2 pN not assigned (no nodes submitted or found).      - 5/8/2023 PET scan:  FINDINGS:  Quality of the study: Adequate.  In the head and neck, there are no hypermetabolic lesions worrisome for malignancy. There are no hypermetabolic mucosal lesions, and there are no pathologically enlarged or hypermetabolic lymph nodes.  In the chest, there are no hypermetabolic lesions worrisome for malignancy.  There are no concerning pulmonary nodules or masses, and there are no pathologically enlarged or hypermetabolic lymph nodes.  Bilateral gynecomastia  In the abdomen and pelvis, there is physiologic tracer distribution within the abdominal organs and excretion into the genitourinary system.  There are multiple prominent hypermetabolic retroperitoneal lymph nodes.  For example, 0.9 cm retrocaval nodes with SUV max 5.1 (axial fused image 165).  1.3 cm aortocaval lymph node at the level of the umbilicus with SUV max 4.9 (axial fused image 173).  0.8 cm right common iliac node with an SUV of 5.0 on image 195.  Postsurgical changes of bilateral orchiectomy with mild postsurgical uptake in the scrotum.  Circumferential bladder wall thickening, likely due to nondistention.  There is a questionable hypermetabolic focus in the  posterior right hepatic lobe with a maximum SUV of 4.2 but no correlate on noncontrast CT image 119.  In the bones, there are no definite hypermetabolic lesions worrisome for malignancy.  There is a hypermetabolic focus with an SUV of 3.8 in the T8 vertebral body without CT correlate on image 107.  In the extremities, there are no hypermetabolic lesions worrisome for malignancy.  Impression:  1.  In this patient with testicular cancer status post bilateral orchiectomy, there are multiple prominent hypermetabolic pelvic and retroperitoneal lymph nodes.  These findings are concerning for metastatic disease.  2.  Questionable hypermetabolic foci in liver and bone may indicate additional metastases.  If management would change, recommend further evaluation with MRI.     - 6/1/2023 MRI Abdomen:  FINDINGS:  Inferior Thorax: Bilateral gynecomastia.  Liver: Normal size and background parenchymal signal.  No suspicious lesion.  Hepatic and portal veins are patent.  Gallbladder: Unremarkable.  Bile Ducts: No dilatation.  Pancreas: No mass or ductal dilatation.  Spleen: Unremarkable.  Adrenals: Unremarkable.  Kidneys/Ureters: No mass or hydroureteronephrosis.  GI Tract/Mesentery: No evidence of bowel obstruction or inflammation.  Peritoneal Space: No ascites.  Retroperitoneum: Few prominent distal retroperitoneal lymph nodes, similar to prior PET-CT.  Distal aortocaval lymph node measures 1.3 cm (series [id number], image 75).  Abdominal wall: Unremarkable.  Vasculature: No aneurysm.  Bones: No suspicious marrow replacing lesion.  Impression:  1. No suspicious hepatic lesion.  2. Few prominent distal retroperitoneal lymph nodes, similar to prior PET-CT.  3. Additional findings as above.     6/14/2023 MRI T spine:  FINDINGS:  Alignment: Normal.  Vertebrae: Normal marrow signal. No fracture.  No enhancing osseous lesions.  Discs: Disc heights are maintained.  No evidence for discitis.  Cord: Normal morphology and signal.  Enhancement:  No abnormal enhancement.  Degenerative findings: No spinal canal stenosis or neural foraminal narrowing at any level.  Paraspinal muscles & soft tissues: Unremarkable.  Impression:  1. Normal examination.  No evidence for metastatic disease.     - s/p 3 cycles of BEP completed 7/24/2023     - scans post  - 10/12/2023 PET:  FINDINGS:  Quality of the study: Adequate.  In the head and neck, there are no hypermetabolic lesions worrisome for malignancy. There are no hypermetabolic mucosal lesions, and there are no pathologically enlarged or hypermetabolic lymph nodes.  In the chest, there are no hypermetabolic lesions worrisome for malignancy.  There are no concerning pulmonary nodules or masses, and there are no pathologically enlarged or hypermetabolic lymph nodes.  In the abdomen and pelvis, there is interval decreased tracer uptake of previous hypermetabolic retroperitoneal and right iliac chain lymph nodes, now with uptake similar to background.  For example: 1.3 cm right para-aortic lymph node with SUV max of 1.8 (series 3, image 168).  Previously measured 1.3 cm with SUV max of 4.9.  Previously described question hypermetabolic focus in the posterior right hepatic lobe is no longer visualized.  Postsurgical changes status post bilateral orchiectomy noting nonspecific tracer uptake in the left aspect of the scrotum, unchanged.  There is physiologic tracer distribution within the abdominal organs and excretion into the genitourinary system.  In the bones, there is a new focus of increased tracer uptake in the right aspect of the manubrium with SUV max of 4.3 on image 57.  No definite CT correlate.  No tracer avid osseous lesion elsewhere.  Previously described hypermetabolic focus in the T8 vertebral body is no longer visualized.  In the extremities, there are no hypermetabolic lesions worrisome for malignancy.  Impression:  Mixed interval changes in this patient with testicular cancer status post bilateral  orchiectomy.  New tracer avid focus in the manubrium suspicious for osseous metastatic focus.  Decreased tracer uptake within retroperitoneal and pelvic lymph nodes, now with uptake similar to background.     - further work op of manubrium with MRI Chest on 10/16/2023:  FINDINGS:  Marrow signal within the osseous structures is normal.  Particular attention given to the area of increased radiotracer uptake involving the sternal aspect of the right sternoclavicular joint.  No abnormal marrow signal or enhancement is seen in this location.  The soft tissue structures within the field of view have a benign appearance.  Impression:  No evidence of osseous metastatic disease involving the manubrium of the sternum     - 5/21/2024 Tumor markers wnl     - 5/21/2024 CT C/A/P:  FINDINGS:  Soft tissue structures at the base of the neck appear unremarkable.  The heart is not enlarged.  The thoracic aorta is normal in caliber without aneurysmal dilatation.  No hilar or mediastinal adenopathy is identified.  No axillary adenopathy is identified.  The lungs are clear.  No suspicious lung nodules are identified.  There is no evidence for pneumothorax or pleural effusions.  Bony structures appear intact.  There is no evidence for acute fracture or bone destruction.  The liver is normal in size and is homogeneous in density with no focal liver lesions identified.  The gallbladder is present and appears grossly unremarkable.  There is no evidence for intrahepatic or extrahepatic biliary dilatation.  The portal venous system is patent.  The spleen, stomach, and pancreas all appear grossly unremarkable.  The adrenal glands are not enlarged.  The kidneys are normal in size, position, and contour and are noted to concentrate contrast symmetrically.  The abdominal aorta tapers normally without aneurysmal dilatation.  Once again, enlarged low-density para-aortic lymph nodes are identified, 1 measuring 1.2 cm in short axis (image 87, series  3), previously 1.2 cm, and the other measuring 1.7 cm in short axis (image 94, series 3), previously 1.4 cm.  The appendix is present and appears unremarkable.  No dilated loops of bowel are evident.  There is circumferential bladder wall thickening present partially related to the decompression of the urinary bladder.  There is no evidence for pelvic or inguinal lymphadenopathy.  There is a small fat containing left inguinal hernia.  The patient has a testicular prosthesis  Impression:  Enlarged low-density para-aortic lymph nodes are again identified.  There does appear to be interval enlargement of at least 1 of the lymph nodes when compared to the prior study.  No new enlarged lymph nodes identified.  Small fat containing left inguinal hernia.    - 7/5/2024 PET scan:  FINDINGS:  Quality of the study: Adequate.  In the head and neck, there are no hypermetabolic lesions worrisome for malignancy. There are no hypermetabolic mucosal lesions, and there are no pathologically enlarged or hypermetabolic lymph nodes.  In the chest, there are no hypermetabolic lesions worrisome for malignancy.  There are no concerning pulmonary nodules or masses, and there are no pathologically enlarged or hypermetabolic lymph nodes.  In the abdomen and pelvis, interval increased size of para-aortic lymph nodes when compared to previous PET-CT, similar size as compared to CT from 05/21/2024.  Nodes continue to demonstrate low level uptake similar to background.  Index right para-aortic lymph node measures 1.6 cm in short axis with background uptake, previously 1.3 cm on prior PET-CT with uptake similar to background.  Measures 1.7 cm on recent CT from 05/21/2024.  Similar postsurgical change status post bilateral orchiectomy.  there is physiologic tracer distribution within the abdominal organs and excretion into the genitourinary system.  In the bones, there are no hypermetabolic lesions worrisome for malignancy.  Previous hypermetabolic  activity in the right aspect of the manubrium has resolved.  In the extremities, there are no hypermetabolic lesions worrisome for malignancy.  Impression:  Increased size of para-aortic lymph nodes as compared to previous PET-CT noting continued uptake similar to background.  Interval resolution of right manubrial hypermetabolic activity.  No suspicious hypermetabolic lesion on today's exam.    - 9/3/2024 RPLND with Dr. Barrow  Pathology:  1. RIGHT GONADAL VEIN AND SPERMATIC CORD, EXCISION:  Benign spermatic cord and vascular tissue.    Negative for malignancy.      2. RIGHT PARACAVAL LYMPH NODES, EXCISION:  Thirteen lymph nodes negative for tumor (0/13).    3. 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  Twenty lymph nodes negative for tumor (0/20).      Comment: Two dominant nodules present are multiloculated cystic lesions consisting of exclusively mature teratomatous elements including cystic spaces lined by respiratory and intestinal type epithelium, epidermal inclusion cysts with keratin material  and smooth muscle tissue.  There is occasional nodular lymphoid tissue at the periphery.  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    The current specimen consists only of mature teratoma.  No immature teratoma or other germ cell elements are identified. 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  Prognosis is reportedly excellent after full excision.    4. RIGHT COMMON ILIAC LYMPH NODES, EXCISION:  Four lymph nodes negative for tumor (0/4).    5. PARA-AORTIC LYMPH NODES,  EXCISION:  Twelve lymph nodes negative for tumor (0/12).      9/3/2024 RPLND with Dr. Barrow  Pathology:  1. RIGHT GONADAL VEIN AND SPERMATIC CORD, EXCISION:  Benign spermatic cord and vascular tissue.    Negative for malignancy.      2. RIGHT PARACAVAL LYMPH NODES, EXCISION:  Thirteen lymph nodes negative for tumor (0/13).    3. 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  Twenty lymph nodes negative for tumor (0/20).      Comment: Two dominant nodules present are multiloculated cystic lesions consisting of exclusively mature teratomatous elements including cystic spaces lined by respiratory and intestinal type epithelium, epidermal inclusion cysts with keratin material  and smooth muscle tissue.  There is occasional nodular lymphoid tissue at the periphery.  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    The current specimen consists only of mature teratoma.  No immature teratoma or other germ cell elements are identified. 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  Prognosis is reportedly excellent after full excision.    4. RIGHT COMMON ILIAC LYMPH NODES, EXCISION:  Four lymph nodes negative for tumor (0/4).    5. PARA-AORTIC LYMPH NODES, EXCISION:  Twelve lymph nodes negative for tumor (0/12).       PMH:  None     FH:  Mother living, healthy   Father, living, healthy  1 brother- Type 1 DM  1 sister- healthy  No major health issues known extended family     SH:  Apartment work, maintenance A/C  Good support system  No EtOH (Rare social)  No tobacco      Review of Systems    Constitutional:  Negative for activity change, appetite change, chills, fatigue, fever and unexpected weight change.        See Above   HENT:  Negative for dental problem, hearing loss, mouth sores, postnasal drip, rhinorrhea, sinus pressure/congestion, sore throat, tinnitus, trouble swallowing and voice change.    Eyes:  Negative for visual disturbance.   Respiratory:  Negative for cough, shortness of breath and wheezing.    Cardiovascular:  Negative for chest pain, palpitations and leg swelling.   Gastrointestinal:  Negative for abdominal distention, abdominal pain, blood in stool, constipation, diarrhea, nausea and vomiting.   Endocrine: Negative for cold intolerance and heat intolerance.   Genitourinary:  Negative for bladder incontinence, decreased urine volume, difficulty urinating, dysuria, frequency, hematuria and urgency.   Musculoskeletal:  Negative for arthralgias, back pain, gait problem, joint swelling, myalgias, neck pain and neck stiffness.   Integumentary:  Negative for color change, pallor, rash and wound.   Neurological:  Negative for dizziness, facial asymmetry, weakness, light-headedness, numbness, headaches and coordination difficulties.   Hematological:  Negative for adenopathy. Does not bruise/bleed easily.   Psychiatric/Behavioral:  Negative for agitation, behavioral problems, confusion, decreased concentration, dysphoric mood and sleep disturbance. The patient is not nervous/anxious.           Objective     Physical Exam  Vitals and nursing note reviewed.   Constitutional:       General: He is not in acute distress.     Appearance: Normal appearance. He is well-developed and normal weight. He is not ill-appearing.      Comments: ECOG=0   HENT:      Head: Normocephalic and atraumatic.   Eyes:      General: No scleral icterus.     Extraocular Movements: Extraocular movements intact.      Conjunctiva/sclera: Conjunctivae normal.      Pupils: Pupils are equal, round, and reactive to light.    Neck:      Thyroid: No thyromegaly.      Trachea: No tracheal deviation.   Cardiovascular:      Rate and Rhythm: Normal rate and regular rhythm.      Heart sounds: Normal heart sounds. No murmur heard.     No friction rub. No gallop.   Pulmonary:      Effort: Pulmonary effort is normal. No respiratory distress.      Breath sounds: Normal breath sounds. No wheezing, rhonchi or rales.   Chest:      Chest wall: No tenderness.   Abdominal:      General: Bowel sounds are normal. There is no distension.      Palpations: Abdomen is soft. There is no mass.      Tenderness: There is no abdominal tenderness. There is no guarding or rebound.   Musculoskeletal:         General: No swelling or tenderness. Normal range of motion.      Cervical back: Normal range of motion and neck supple.      Right lower leg: No edema.      Left lower leg: No edema.   Lymphadenopathy:      Cervical: No cervical adenopathy.   Skin:     General: Skin is warm and dry.      Coloration: Skin is not jaundiced or pale.      Findings: No erythema, lesion or rash.   Neurological:      General: No focal deficit present.      Mental Status: He is alert and oriented to person, place, and time. Mental status is at baseline.      Sensory: No sensory deficit.      Motor: No weakness or abnormal muscle tone.      Coordination: Coordination normal.      Gait: Gait normal.      Deep Tendon Reflexes: Reflexes are normal and symmetric. Reflexes normal.   Psychiatric:         Mood and Affect: Mood normal.         Behavior: Behavior normal.         Thought Content: Thought content normal.         Judgment: Judgment normal.     Labs- reviewed     Assessment and Plan     1. Malignant neoplasm of descended right testis    2. Anemia due to chemotherapy      Imaging and labs in June  MARIAH      Route Chart for Scheduling    Med Onc Chart Routing      Follow up with physician 3 months. scans and labs and EP day after   Follow up with GREGORY    Infusion scheduling note     Injection scheduling note    Labs    Imaging    Pharmacy appointment    Other referrals                Treatment Plan Information   OP TESTICULAR BEP 5-DAY Q3W Zenobia Alston MD   Associated diagnosis: Malignant neoplasm of descended right testis Stage Unknown pT2, pNX, cM1, S1 noted on 5/5/2023   Line of treatment: First Line  Treatment Goal: Curative     Upcoming Treatment Dates - OP TESTICULAR BEP 5-DAY Q3W    No upcoming days in selected categories.

## 2025-03-12 LAB — B-HCG SERPL-ACNC: <0.6 IU/L

## 2025-04-04 ENCOUNTER — LAB VISIT (OUTPATIENT)
Dept: LAB | Facility: HOSPITAL | Age: 30
End: 2025-04-04
Attending: UROLOGY
Payer: COMMERCIAL

## 2025-04-04 DIAGNOSIS — C62.90 MALIGNANT NEOPLASM OF TESTICLE, UNSPECIFIED LATERALITY, UNSPECIFIED WHETHER DESCENDED OR UNDESCENDED: ICD-10-CM

## 2025-04-04 LAB
AFP SERPL-MCNC: 3.6 NG/ML
LDH SERPL-CCNC: 186 U/L (ref 110–260)

## 2025-04-04 PROCEDURE — 83615 LACTATE (LD) (LDH) ENZYME: CPT

## 2025-04-04 PROCEDURE — 82105 ALPHA-FETOPROTEIN SERUM: CPT

## 2025-04-04 PROCEDURE — 84702 CHORIONIC GONADOTROPIN TEST: CPT

## 2025-04-04 PROCEDURE — 36415 COLL VENOUS BLD VENIPUNCTURE: CPT

## 2025-04-07 LAB — B-HCG SERPL-ACNC: <0.6 IU/L

## 2025-06-16 ENCOUNTER — HOSPITAL ENCOUNTER (OUTPATIENT)
Dept: RADIOLOGY | Facility: HOSPITAL | Age: 30
Discharge: HOME | End: 2025-06-16
Attending: INTERNAL MEDICINE
Payer: COMMERCIAL

## 2025-06-16 ENCOUNTER — TELEPHONE (OUTPATIENT)
Dept: HEMATOLOGY/ONCOLOGY | Facility: CLINIC | Age: 30
End: 2025-06-16

## 2025-06-16 DIAGNOSIS — C62.11 MALIGNANT NEOPLASM OF DESCENDED RIGHT TESTIS: ICD-10-CM

## 2025-06-16 PROCEDURE — A9698 NON-RAD CONTRAST MATERIALNOC: HCPCS | Performed by: INTERNAL MEDICINE

## 2025-06-16 PROCEDURE — 25500020 PHARM REV CODE 255: Performed by: INTERNAL MEDICINE

## 2025-06-16 PROCEDURE — 74177 CT ABD & PELVIS W/CONTRAST: CPT | Mod: TC

## 2025-06-16 PROCEDURE — 74177 CT ABD & PELVIS W/CONTRAST: CPT | Mod: 26,,, | Performed by: RADIOLOGY

## 2025-06-16 RX ADMIN — IOHEXOL 100 ML: 350 INJECTION, SOLUTION INTRAVENOUS at 10:06

## 2025-06-16 RX ADMIN — BARIUM SULFATE 450 ML: 20 SUSPENSION ORAL at 09:06

## 2025-06-17 ENCOUNTER — OFFICE VISIT (OUTPATIENT)
Dept: HEMATOLOGY/ONCOLOGY | Facility: CLINIC | Age: 30
End: 2025-06-17
Payer: COMMERCIAL

## 2025-06-17 VITALS
RESPIRATION RATE: 17 BRPM | TEMPERATURE: 98 F | DIASTOLIC BLOOD PRESSURE: 69 MMHG | BODY MASS INDEX: 27.67 KG/M2 | HEIGHT: 70 IN | WEIGHT: 193.31 LBS | OXYGEN SATURATION: 97 % | HEART RATE: 73 BPM | SYSTOLIC BLOOD PRESSURE: 138 MMHG

## 2025-06-17 DIAGNOSIS — Z86.718 HISTORY OF DEEP VEIN THROMBOSIS: ICD-10-CM

## 2025-06-17 DIAGNOSIS — C62.11 MALIGNANT NEOPLASM OF DESCENDED RIGHT TESTIS: Primary | ICD-10-CM

## 2025-06-17 PROCEDURE — 3075F SYST BP GE 130 - 139MM HG: CPT | Mod: CPTII,S$GLB,, | Performed by: INTERNAL MEDICINE

## 2025-06-17 PROCEDURE — 3078F DIAST BP <80 MM HG: CPT | Mod: CPTII,S$GLB,, | Performed by: INTERNAL MEDICINE

## 2025-06-17 PROCEDURE — 99999 PR PBB SHADOW E&M-EST. PATIENT-LVL IV: CPT | Mod: PBBFAC,,, | Performed by: INTERNAL MEDICINE

## 2025-06-17 PROCEDURE — 1159F MED LIST DOCD IN RCRD: CPT | Mod: CPTII,S$GLB,, | Performed by: INTERNAL MEDICINE

## 2025-06-17 PROCEDURE — G2211 COMPLEX E/M VISIT ADD ON: HCPCS | Mod: S$GLB,,, | Performed by: INTERNAL MEDICINE

## 2025-06-17 PROCEDURE — 3008F BODY MASS INDEX DOCD: CPT | Mod: CPTII,S$GLB,, | Performed by: INTERNAL MEDICINE

## 2025-06-17 PROCEDURE — 99215 OFFICE O/P EST HI 40 MIN: CPT | Mod: S$GLB,,, | Performed by: INTERNAL MEDICINE

## 2025-06-17 NOTE — PROGRESS NOTES
Subjective     Patient ID: Arturo Salguero III is a 29 y.o. male.    Chief Complaint: No chief complaint on file.    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  Heart: No cardiomegaly or pericardial effusion.  Emma/Mediastinum: No significant lymphadenopathy  Lungs: No consolidation, pneumothorax, or pleural effusion.  Liver: Normal in size without focal hepatic abnormality.  Gallbladder: No calcified gallstones.  No gallbladder wall thickening.  No pericholecystic fluid.  No gallbladder distension  Bile Ducts: No intra or extrahepatic biliary ductal dilation.  Pancreas: No pancreatic mass lesion or duct dilatation.  Spleen: Unremarkable.  Adrenals: Unremarkable.  Kidneys/ Ureters: Normal in size and location.  Kidneys enhance symmetrically.  No nephrolithiasis.  No hydroureteronephrosis.  Bladder: Smooth contours without bladder wall thickening.  Reproductive organs: Prior orchiectomy with testicular implant in place.  GI Tract/Mesentery: The esophagus and stomach are normal in appearance.  No evidence for bowel obstruction or inflammation. The appendix is unremarkable.  Peritoneal Space: Interval decrease in fat stranding along the anterior abdominal wall, adjacent to postsurgical changes, measuring 2.0 cm (series 2, image 62), previously measuring 3.5 cm no intraperitoneal free air or free fluid.  Lymph nodes: Postsurgical change of retroperitoneal lymph node dissection.  No new lymphadenopathy.  Abdominal wall/extraperitoneal soft  tissues: Postsurgical changes along the anterior abdominal wall.  Small fat containing abdominal wall hernia.  Vasculature: Left-sided aortic arch.  Thoracic and abdominal aorta are normal in caliber, contour, and course without significant calcific atherosclerosis.  Bones: No acute displaced fracture or bone destructive process.  Impression:  1. Postoperative change of right orchiectomy and retroperitoneal lymph node dissection.  No new lymphadenopathy.  No suspicious lesions elsewhere.  2. Additional findings as detailed above.     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  *Flow: Normal arterial and venous flow  *Epididymis: Normal.  *Hydrocele: None.  *Varicocele: None.  Left Testicle:  *Size: 7.7 x 5.2 x 5.6 cm  *Appearance: Markedly heterogeneous echotexture.  *Flow: Normal arterial and venous flow  *Epididymis: Normal.  *Hydrocele: None.  *Varicocele: None.  Other findings: None.  Impression:  Bilateral testicular enlargement with markedly heterogeneous echotexture, concerning for infiltrative process such as lymphoma.  Correlation with prior history is recommended as sequela from bilateral remote orchitis may give a similar appearance.  Consultation with urology is recommended.  This report was flagged in Epic as abnormal.     - 3/21/2023 CT C/A/P:  FINDINGS:  BASE OF NECK: No significant abnormality.  Thyroid gland unremarkable.  THORACIC SOFT TISSUES: No axillary  adenopathy.  AORTA: Thoracic aorta maintains normal caliber.  Left-sided aortic arch with normal three vessel branching pattern.  No calcific atherosclerosis of the thoracic aorta.  HEART: Normal size with physiologic pericardial fluid.  PULMONARY VASCULATURE: Unremarkable.  EMILY/MEDIASTINUM: No pathologic alexandra enlargement.  AIRWAYS: Trachea and proximal airways remain patent.  LUNGS/PLEURA: Lungs are symmetrically well expanded.  No focal consolidation, mass, pneumothorax, or pleural fluid.  LIVER: Normal in size and contour.  No focal hepatic lesion.  GALLBLADDER: No calcified gallstones.  BILE DUCTS: No evidence of dilated ducts.  PANCREAS: No mass or peripancreatic fat stranding.  SPLEEN: No splenomegaly.  ADRENALS: Unremarkable.  KIDNEYS/URETERS: Normal in size and location with normal enhancement.  No hydronephrosis or nephrolithiasis. No ureteral dilatation.  BLADDER: No evidence of wall thickening.  REPRODUCTIVE ORGANS: Heterogeneous 6.5 x 6.2 cm mass-like enlargement of right testicle, correlate with 03/20/2023 ultrasound.  STOMACH/DUODENUM: Unremarkable.  BOWEL/MESENTERY: Small bowel is normal in caliber with no evidence of obstruction. No evidence of inflammation or wall thickening.  Colon demonstrates no focal wall thickening.  Appendix is visualized and appears normal.  PERITONEUM: No intraperitoneal free air or fluid.  LYMPH NODES: Enlarged 1.1 cm pericaval node (axial 3:99).  No pelvic or inguinal lymphadenopathy.  VASCULATURE: No significant calcific atherosclerosis.  Portal venous system is patent.  ABDOMINAL WALL:  Small fat containing umbilical hernia.  BONES: No acute fracture. No suspicious osseous lesions.  Impression:  1. Enlarged 1.1 cm pericaval lymph node.  No additional enlarged lymph nodes throughout the abdomen.  No pelvic or inguinal lymphadenopathy.  2. Heterogeneous 6.5 x 6.2 cm mass-like enlargement of right testicle, correlate with 03/20/2023 ultrasound.  3. Other findings  above.  This report was flagged in Epic as abnormal.     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   - See [facility] synoptic report below.   CASE SUMMARY: (TESTIS: Radical Orchiectomy)   Standard(s): AJCC-UICC 8   SPECIMEN   Specimen Laterality: Right.   Tumor Focality: Unifocal.   Tumor Size: Greatest dimension of main tumor mass in Centimeters (cm): 6.7 cm.   Histologic Type: Mixed germ cell tumor.   Embryonal carcinoma (specify percentage): 50 %   Yolk sac tumor, postpubertal type (specify percentage): 40 %   Choriocarcinoma (specify percentage): 10 %   Tumor Extent: Invades rete testis.   Lymphovascular Invasion: Present.   Margin Status: All margins negative for tumor.   Regional Lymph Node Status: Not applicable (no regional lymph nodes submitted or found).   Distant Site: Not applicable.   PATHOLOGIC STAGE CLASSIFICATION (pTNM, AJCC 8th Edition): pT2 pN not assigned (no nodes submitted or found).      - 5/8/2023 PET scan:  FINDINGS:  Quality of the study: Adequate.  In the head and neck, there are no hypermetabolic lesions worrisome for malignancy. There are no hypermetabolic mucosal lesions, and there are no pathologically enlarged or hypermetabolic lymph nodes.  In the chest, there are no hypermetabolic lesions worrisome for malignancy.  There are no concerning pulmonary nodules or masses, and there are no pathologically enlarged or hypermetabolic lymph nodes.  Bilateral gynecomastia  In the abdomen and pelvis, there is physiologic tracer distribution within the abdominal organs and excretion into the genitourinary system.  There are multiple prominent hypermetabolic retroperitoneal lymph nodes.  For example, 0.9 cm retrocaval nodes with SUV max 5.1 (axial fused image 165).  1.3 cm aortocaval lymph node at the level of the umbilicus with SUV max 4.9 (axial fused image 173).  0.8 cm right common iliac node with an SUV of 5.0 on  image 195.  Postsurgical changes of bilateral orchiectomy with mild postsurgical uptake in the scrotum.  Circumferential bladder wall thickening, likely due to nondistention.  There is a questionable hypermetabolic focus in the posterior right hepatic lobe with a maximum SUV of 4.2 but no correlate on noncontrast CT image 119.  In the bones, there are no definite hypermetabolic lesions worrisome for malignancy.  There is a hypermetabolic focus with an SUV of 3.8 in the T8 vertebral body without CT correlate on image 107.  In the extremities, there are no hypermetabolic lesions worrisome for malignancy.  Impression:  1.  In this patient with testicular cancer status post bilateral orchiectomy, there are multiple prominent hypermetabolic pelvic and retroperitoneal lymph nodes.  These findings are concerning for metastatic disease.  2.  Questionable hypermetabolic foci in liver and bone may indicate additional metastases.  If management would change, recommend further evaluation with MRI.     - 6/1/2023 MRI Abdomen:  FINDINGS:  Inferior Thorax: Bilateral gynecomastia.  Liver: Normal size and background parenchymal signal.  No suspicious lesion.  Hepatic and portal veins are patent.  Gallbladder: Unremarkable.  Bile Ducts: No dilatation.  Pancreas: No mass or ductal dilatation.  Spleen: Unremarkable.  Adrenals: Unremarkable.  Kidneys/Ureters: No mass or hydroureteronephrosis.  GI Tract/Mesentery: No evidence of bowel obstruction or inflammation.  Peritoneal Space: No ascites.  Retroperitoneum: Few prominent distal retroperitoneal lymph nodes, similar to prior PET-CT.  Distal aortocaval lymph node measures 1.3 cm (series 22267, image 75).  Abdominal wall: Unremarkable.  Vasculature: No aneurysm.  Bones: No suspicious marrow replacing lesion.  Impression:  1. No suspicious hepatic lesion.  2. Few prominent distal retroperitoneal lymph nodes, similar to prior PET-CT.  3. Additional findings as above.     6/14/2023 MRI T  spine:  FINDINGS:  Alignment: Normal.  Vertebrae: Normal marrow signal. No fracture.  No enhancing osseous lesions.  Discs: Disc heights are maintained.  No evidence for discitis.  Cord: Normal morphology and signal.  Enhancement: No abnormal enhancement.  Degenerative findings: No spinal canal stenosis or neural foraminal narrowing at any level.  Paraspinal muscles & soft tissues: Unremarkable.  Impression:  1. Normal examination.  No evidence for metastatic disease.     - s/p 3 cycles of BEP completed 7/24/2023     - scans post  - 10/12/2023 PET:  FINDINGS:  Quality of the study: Adequate.  In the head and neck, there are no hypermetabolic lesions worrisome for malignancy. There are no hypermetabolic mucosal lesions, and there are no pathologically enlarged or hypermetabolic lymph nodes.  In the chest, there are no hypermetabolic lesions worrisome for malignancy.  There are no concerning pulmonary nodules or masses, and there are no pathologically enlarged or hypermetabolic lymph nodes.  In the abdomen and pelvis, there is interval decreased tracer uptake of previous hypermetabolic retroperitoneal and right iliac chain lymph nodes, now with uptake similar to background.  For example: 1.3 cm right para-aortic lymph node with SUV max of 1.8 (series 3, image 168).  Previously measured 1.3 cm with SUV max of 4.9.  Previously described question hypermetabolic focus in the posterior right hepatic lobe is no longer visualized.  Postsurgical changes status post bilateral orchiectomy noting nonspecific tracer uptake in the left aspect of the scrotum, unchanged.  There is physiologic tracer distribution within the abdominal organs and excretion into the genitourinary system.  In the bones, there is a new focus of increased tracer uptake in the right aspect of the manubrium with SUV max of 4.3 on image 57.  No definite CT correlate.  No tracer avid osseous lesion elsewhere.  Previously described hypermetabolic focus in the  T8 vertebral body is no longer visualized.  In the extremities, there are no hypermetabolic lesions worrisome for malignancy.  Impression:  Mixed interval changes in this patient with testicular cancer status post bilateral orchiectomy.  New tracer avid focus in the manubrium suspicious for osseous metastatic focus.  Decreased tracer uptake within retroperitoneal and pelvic lymph nodes, now with uptake similar to background.     - further work op of manubrium with MRI Chest on 10/16/2023:  FINDINGS:  Marrow signal within the osseous structures is normal.  Particular attention given to the area of increased radiotracer uptake involving the sternal aspect of the right sternoclavicular joint.  No abnormal marrow signal or enhancement is seen in this location.  The soft tissue structures within the field of view have a benign appearance.  Impression:  No evidence of osseous metastatic disease involving the manubrium of the sternum     - 5/21/2024 Tumor markers wnl     - 5/21/2024 CT C/A/P:  FINDINGS:  Soft tissue structures at the base of the neck appear unremarkable.  The heart is not enlarged.  The thoracic aorta is normal in caliber without aneurysmal dilatation.  No hilar or mediastinal adenopathy is identified.  No axillary adenopathy is identified.  The lungs are clear.  No suspicious lung nodules are identified.  There is no evidence for pneumothorax or pleural effusions.  Bony structures appear intact.  There is no evidence for acute fracture or bone destruction.  The liver is normal in size and is homogeneous in density with no focal liver lesions identified.  The gallbladder is present and appears grossly unremarkable.  There is no evidence for intrahepatic or extrahepatic biliary dilatation.  The portal venous system is patent.  The spleen, stomach, and pancreas all appear grossly unremarkable.  The adrenal glands are not enlarged.  The kidneys are normal in size, position, and contour and are noted to  concentrate contrast symmetrically.  The abdominal aorta tapers normally without aneurysmal dilatation.  Once again, enlarged low-density para-aortic lymph nodes are identified, 1 measuring 1.2 cm in short axis (image 87, series 3), previously 1.2 cm, and the other measuring 1.7 cm in short axis (image 94, series 3), previously 1.4 cm.  The appendix is present and appears unremarkable.  No dilated loops of bowel are evident.  There is circumferential bladder wall thickening present partially related to the decompression of the urinary bladder.  There is no evidence for pelvic or inguinal lymphadenopathy.  There is a small fat containing left inguinal hernia.  The patient has a testicular prosthesis  Impression:  Enlarged low-density para-aortic lymph nodes are again identified.  There does appear to be interval enlargement of at least 1 of the lymph nodes when compared to the prior study.  No new enlarged lymph nodes identified.  Small fat containing left inguinal hernia.     - 7/5/2024 PET scan:  FINDINGS:  Quality of the study: Adequate.  In the head and neck, there are no hypermetabolic lesions worrisome for malignancy. There are no hypermetabolic mucosal lesions, and there are no pathologically enlarged or hypermetabolic lymph nodes.  In the chest, there are no hypermetabolic lesions worrisome for malignancy.  There are no concerning pulmonary nodules or masses, and there are no pathologically enlarged or hypermetabolic lymph nodes.  In the abdomen and pelvis, interval increased size of para-aortic lymph nodes when compared to previous PET-CT, similar size as compared to CT from 05/21/2024.  Nodes continue to demonstrate low level uptake similar to background.  Index right para-aortic lymph node measures 1.6 cm in short axis with background uptake, previously 1.3 cm on prior PET-CT with uptake similar to background.  Measures 1.7 cm on recent CT from 05/21/2024.  Similar postsurgical change status post bilateral  orchiectomy.  there is physiologic tracer distribution within the abdominal organs and excretion into the genitourinary system.  In the bones, there are no hypermetabolic lesions worrisome for malignancy.  Previous hypermetabolic activity in the right aspect of the manubrium has resolved.  In the extremities, there are no hypermetabolic lesions worrisome for malignancy.  Impression:  Increased size of para-aortic lymph nodes as compared to previous PET-CT noting continued uptake similar to background.  Interval resolution of right manubrial hypermetabolic activity.  No suspicious hypermetabolic lesion on today's exam.     - 9/3/2024 RPLND with Dr. Barrow  Pathology:  1. RIGHT GONADAL VEIN AND SPERMATIC CORD, EXCISION:  Benign spermatic cord and vascular tissue.    Negative for malignancy.      2. RIGHT PARACAVAL LYMPH NODES, EXCISION:  Thirteen lymph nodes negative for tumor (0/13).    3. 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  Twenty lymph nodes negative for tumor (0/20).      Comment: Two dominant nodules present are multiloculated cystic lesions consisting of exclusively mature teratomatous elements including cystic spaces lined by respiratory and intestinal type epithelium, epidermal inclusion cysts with keratin material  and smooth muscle tissue.  There is occasional nodular lymphoid tissue at the periphery.  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    The current specimen consists only of mature teratoma.  No immature teratoma or other germ cell elements are identified. 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  Prognosis is reportedly excellent after full excision.    4. RIGHT COMMON ILIAC LYMPH NODES, EXCISION:  Four lymph nodes negative for tumor (0/4).    5. PARA-AORTIC LYMPH NODES, EXCISION:  Twelve lymph nodes negative for tumor (0/12).       PMH:  None     FH:  Mother living, healthy   Father, living, healthy  1 brother- Type 1 DM  1 sister- healthy  No major health issues known extended family     SH:  Apartment work, maintenance A/C  Good support system  No EtOH (Rare social)  No tobacco    Review of Systems   Constitutional:  Negative for activity change, appetite change, chills, fatigue, fever and unexpected weight change.        See Above   HENT:  Negative for nasal congestion, dental problem, hearing loss, mouth sores, postnasal drip, sinus pressure/congestion, tinnitus, trouble swallowing and voice change.    Eyes:  Negative for visual disturbance.   Respiratory:  Negative for cough, shortness of breath and wheezing.    Cardiovascular:  Negative for chest pain, palpitations and leg swelling.   Gastrointestinal:  Negative for abdominal distention, abdominal pain, blood in stool, constipation, diarrhea, nausea and vomiting.   Endocrine: Negative for cold intolerance and heat intolerance.   Genitourinary:  Negative for bladder incontinence, decreased urine volume, difficulty urinating, dysuria, frequency, hematuria and urgency.   Musculoskeletal:  Negative for arthralgias, back pain, gait problem, joint swelling, myalgias, neck pain and neck stiffness.   Integumentary:  Negative for color change, pallor, rash and wound.   Neurological:  Negative for dizziness, facial asymmetry, weakness, light-headedness, numbness, headaches and coordination difficulties.   Hematological:  Negative for adenopathy. Does not bruise/bleed easily.   Psychiatric/Behavioral:  Negative for agitation, behavioral problems, confusion, decreased concentration, dysphoric mood and sleep  disturbance. The patient is not nervous/anxious.           Objective     Physical Exam  Vitals and nursing note reviewed.   Constitutional:       General: He is not in acute distress.     Appearance: Normal appearance. He is well-developed and normal weight. He is not ill-appearing.      Comments: Very pleasant  Presents alone  ECOG= 0   HENT:      Head: Normocephalic and atraumatic.   Eyes:      General: No scleral icterus.     Extraocular Movements: Extraocular movements intact.      Conjunctiva/sclera: Conjunctivae normal.      Pupils: Pupils are equal, round, and reactive to light.   Neck:      Thyroid: No thyromegaly.      Trachea: No tracheal deviation.   Cardiovascular:      Rate and Rhythm: Normal rate and regular rhythm.      Heart sounds: Normal heart sounds. No murmur heard.     No friction rub. No gallop.   Pulmonary:      Effort: Pulmonary effort is normal. No respiratory distress.      Breath sounds: Normal breath sounds. No wheezing, rhonchi or rales.   Chest:      Chest wall: No tenderness.   Abdominal:      General: Bowel sounds are normal. There is no distension.      Palpations: Abdomen is soft. There is no mass.      Tenderness: There is no abdominal tenderness. There is no guarding or rebound.   Genitourinary:     Testes: Normal.      Comments: Normal remaining testicle  Musculoskeletal:         General: No swelling or tenderness. Normal range of motion.      Cervical back: Normal range of motion and neck supple.      Right lower leg: No edema.      Left lower leg: No edema.   Lymphadenopathy:      Cervical: No cervical adenopathy.   Skin:     General: Skin is warm and dry.      Coloration: Skin is not jaundiced or pale.      Findings: No erythema, lesion or rash.   Neurological:      General: No focal deficit present.      Mental Status: He is alert and oriented to person, place, and time. Mental status is at baseline.      Sensory: No sensory deficit.      Motor: No weakness or abnormal muscle  tone.      Coordination: Coordination normal.      Gait: Gait normal.      Deep Tendon Reflexes: Reflexes are normal and symmetric. Reflexes normal.   Psychiatric:         Mood and Affect: Mood normal.         Behavior: Behavior normal.         Thought Content: Thought content normal.         Judgment: Judgment normal.   Labs- reviewed  Imaging- reviewed       Assessment and Plan     1. Malignant neoplasm of descended right testis    2. History of deep vein thrombosis    Can cancel labs next week as tumor markers already done today    Clinically and radiographically MARIAH  H+P and labs (including tumor markers) in 3 months      Imaging at end of year  Return to [facility] surveillance post  CT scan 6 months     Route Chart for Scheduling    Med Onc Chart Routing      Follow up with physician 6 months. labs- cbc, cmp, afp, bhcg, ldh; CT scans   Follow up with GREGORY 3 months. labs prior- cbc, cmp, afp, bhcg, ldh   Infusion scheduling note    Injection scheduling note    Labs    Imaging    Pharmacy appointment    Other referrals              Treatment Plan Information   OP TESTICULAR BEP 5-DAY Q3W Zenobia Alston MD   Associated diagnosis: Malignant neoplasm of descended right testis Stage Unknown pT2, pNX, cM1, S1 noted on 5/5/2023   Line of treatment: First Line  Treatment Goal: Curative     Upcoming Treatment Dates - OP TESTICULAR BEP 5-DAY Q3W    No upcoming days in selected categories.

## 2025-06-18 DIAGNOSIS — C62.11 MALIGNANT NEOPLASM OF DESCENDED RIGHT TESTIS: Primary | ICD-10-CM

## 2025-07-09 ENCOUNTER — OFFICE VISIT (OUTPATIENT)
Dept: URGENT CARE | Facility: CLINIC | Age: 30
End: 2025-07-09
Payer: COMMERCIAL

## 2025-07-09 VITALS
SYSTOLIC BLOOD PRESSURE: 131 MMHG | OXYGEN SATURATION: 98 % | BODY MASS INDEX: 27.63 KG/M2 | WEIGHT: 193 LBS | RESPIRATION RATE: 19 BRPM | HEIGHT: 70 IN | TEMPERATURE: 98 F | DIASTOLIC BLOOD PRESSURE: 84 MMHG | HEART RATE: 62 BPM

## 2025-07-09 DIAGNOSIS — T14.8XXA WOUND, OPEN: Primary | ICD-10-CM

## 2025-07-09 DIAGNOSIS — S61.215A LACERATION OF LEFT RING FINGER WITHOUT FOREIGN BODY WITHOUT DAMAGE TO NAIL, INITIAL ENCOUNTER: ICD-10-CM

## 2025-07-09 DIAGNOSIS — L03.90 WOUND CELLULITIS: ICD-10-CM

## 2025-07-09 PROBLEM — S69.91XA INJURY OF FINGER OF RIGHT HAND: Status: ACTIVE | Noted: 2025-07-09

## 2025-07-09 PROCEDURE — 99214 OFFICE O/P EST MOD 30 MIN: CPT | Mod: 25,S$GLB,, | Performed by: FAMILY MEDICINE

## 2025-07-09 PROCEDURE — 12002 RPR S/N/AX/GEN/TRNK2.6-7.5CM: CPT | Mod: S$GLB,,, | Performed by: FAMILY MEDICINE

## 2025-07-09 RX ORDER — CLINDAMYCIN HYDROCHLORIDE 150 MG/1
150 CAPSULE ORAL EVERY 8 HOURS
Qty: 21 CAPSULE | Refills: 0 | Status: SHIPPED | OUTPATIENT
Start: 2025-07-09 | End: 2025-07-16

## 2025-07-09 NOTE — PROGRESS NOTES
"Subjective:      Patient ID: Arturo Salguero III is a 29 y.o. male.    Vitals:  height is 5' 10" (1.778 m) and weight is 87.5 kg (193 lb). His oral temperature is 98.3 °F (36.8 °C). His blood pressure is 131/84 and his pulse is 62. His respiration is 19 and oxygen saturation is 98%.     Chief Complaint: Laceration    This is a 29 y.o. male who presents today with a chief complaint of cut on left ring finger from an AC unit less than a hour ago.    Laceration   The incident occurred less than 1 hour ago. The laceration is located on the Left hand. The laceration mechanism was a metal edge. The pain is at a severity of 5/10. The pain is mild. The pain has been Constant since onset. His tetanus status is UTD.     Skin:  Positive for laceration.    Objective:     Physical Exam   Constitutional: He is oriented to person, place, and time. He does not appear ill. He appears distressed.   HENT:   Head: Normocephalic and atraumatic.   Ears:   Right Ear: External ear normal.   Left Ear: External ear normal.   Nose: No rhinorrhea or congestion.   Eyes: Conjunctivae are normal. Pupils are equal, round, and reactive to light. Extraocular movement intact   Neck: Neck supple.   Pulmonary/Chest: Effort normal. No stridor. No respiratory distress.   Abdominal: Normal appearance.   Musculoskeletal: Normal range of motion.         General: No deformity. Normal range of motion.      Left hand: Left index finger: Exhibits bleeding. Left ring finger: Injuries: laceration.        Hands:    Neurological: no focal deficit. He is alert, oriented to person, place, and time and at baseline.   Skin: Skin is warm and dry. Capillary refill takes less than 2 seconds. lesion   Psychiatric: His behavior is normal. Mood, judgment and thought content normal.   Nursing note and vitals reviewed.    Assessment:     Plan:   1. Wound, open  - clindamycin (CLEOCIN) 150 MG capsule; Take 1 capsule (150 mg total) by mouth every 8 (eight) hours. for 7 days  " Dispense: 21 capsule; Refill: 0    2. Injury of finger of right hand, initial encounter  - SPLINT FOR HOME USE    3. Laceration of left ring finger without foreign body without damage to nail, initial encounter  - Laceration Repair   Return in 10 days for suture removal.  See procedure note 7-9-25

## 2025-07-09 NOTE — PROCEDURES
Laceration Repair    Date/Time: 7/9/2025 12:00 PM    Performed by: Mary Griffin MD  Authorized by: Mary Griffin MD  Body area: upper extremity  Location details: left ring finger  Laceration length: 3 cm  Foreign bodies: no foreign bodies  Tendon involvement: none  Nerve involvement: none  Vascular damage: no  Anesthesia: local infiltration    Anesthesia:  Local Anesthetic: lidocaine 2% without epinephrine  Anesthetic total: 4 mL    Patient sedated: no  Preparation: Patient was prepped and draped in the usual sterile fashion.  Irrigation solution: saline  Amount of cleaning: standard  Debridement: none  Degree of undermining: none  Skin closure: 3-0 nylon  Number of sutures: 4  Technique: simple  Approximation: close  Approximation difficulty: simple  Dressing: 4x4 sterile gauze and splint for protection  Patient tolerance: Patient tolerated the procedure well with no immediate complications  Comments: Blood loss 1cc

## (undated) DEVICE — DRAPE STERI INSTRUMENT 1018

## (undated) DEVICE — TRAY GENERAL SURGERY OMC

## (undated) DEVICE — NDL HYPO REG 25G X 1 1/2

## (undated) DEVICE — NDL 22GA X1 1/2 REG BEVEL

## (undated) DEVICE — TIP YANKAUERS BULB NO VENT

## (undated) DEVICE — ELECTRODE REM PLYHSV RETURN 9

## (undated) DEVICE — SEALER MARYLAND 1 STEP 23CM

## (undated) DEVICE — GOWN SURGICAL X-LARGE

## (undated) DEVICE — EVACUATOR WOUND BULB 100CC

## (undated) DEVICE — SUT SILK 0 STRANDS 30IN BLK

## (undated) DEVICE — CLIPPER BLADE MOD 4406 (CAREF)

## (undated) DEVICE — SUT CTD VICRYL UND BR CP-1

## (undated) DEVICE — DRAPE LAP T SHT W/ INSTR PAD

## (undated) DEVICE — CONTAINER SPECIMEN OR STER 4OZ

## (undated) DEVICE — GOWN NONREINF SET-IN SLV 2XL

## (undated) DEVICE — CLIP LIGACLIP XTRA TITANIUM

## (undated) DEVICE — BELLOW CANN HEMOBLAST 1.65GR

## (undated) DEVICE — BLADE SURG #15 CARBON STEEL

## (undated) DEVICE — SUT SILK 0 SH 30IN BLK BR

## (undated) DEVICE — TOWEL OR DISP STRL BLUE 4/PK

## (undated) DEVICE — TRAY CATH 1-LYR URIMTR 16FR

## (undated) DEVICE — SUT PROLENE 3-0 SH DA 36 BL

## (undated) DEVICE — TRAY MINOR GEN SURG OMC

## (undated) DEVICE — APPLICATOR CHLORAPREP ORN 26ML

## (undated) DEVICE — DRAIN PENROSE XRAY 12 X 1/4 ST

## (undated) DEVICE — DRAPE INCISE IOBAN 2 23X17IN

## (undated) DEVICE — LOOP VESSEL BLUE MAXI

## (undated) DEVICE — SUT PDS II O CTX MONO 60

## (undated) DEVICE — Device

## (undated) DEVICE — LOOP VESSEL MAXI RED

## (undated) DEVICE — SYR 30CC LUER LOCK

## (undated) DEVICE — LOOP VESSEL YELLOW MAXI